# Patient Record
Sex: MALE | Race: WHITE | NOT HISPANIC OR LATINO | Employment: UNEMPLOYED | URBAN - METROPOLITAN AREA
[De-identification: names, ages, dates, MRNs, and addresses within clinical notes are randomized per-mention and may not be internally consistent; named-entity substitution may affect disease eponyms.]

---
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
## 2022-01-18 ENCOUNTER — IMMUNIZATION (OUTPATIENT)
Dept: PHARMACY | Facility: CLINIC | Age: 22
End: 2022-01-18
Payer: OTHER GOVERNMENT

## 2022-01-18 DIAGNOSIS — Z23 NEED FOR VACCINATION: Primary | ICD-10-CM

## 2022-01-19 ENCOUNTER — LAB VISIT (OUTPATIENT)
Dept: PRIMARY CARE CLINIC | Facility: CLINIC | Age: 22
End: 2022-01-19
Payer: OTHER GOVERNMENT

## 2022-01-19 DIAGNOSIS — Z20.822 CONTACT WITH AND (SUSPECTED) EXPOSURE TO COVID-19: ICD-10-CM

## 2022-01-19 LAB
CTP QC/QA: YES
SARS-COV-2 AG RESP QL IA.RAPID: NEGATIVE

## 2022-01-19 PROCEDURE — 87811 SARS-COV-2 COVID19 W/OPTIC: CPT

## 2022-01-20 NOTE — PROGRESS NOTES
"  PSYCHIATRIC EVALUATION     Disclaimer: Evaluation and treatment is based on information presented to date. Any new information may affect assessment and findings.     Name: Herminio Turner  Age: 21 y.o.  : 2000    Note:Face to Face time with patient: 120 minutes of total time spent on the encounter,  (60  which includes face to face time and (60 min +) non-face to face time preparing to see the patient and preparting report    including but not limited to: 1) Obtaining and/or reviewing separately obtained history, 2) Documenting clinical information in the electronic or other health record, 3) Independently  reviewing / interpreting results as clinically indicated ; 4) discussing medication options including risks and benefits as well as 5) recommendations  for therapeutic interventions and 5) where appropriate additional educational resources (ex: reference books / websites); 6) communicating assessment and plan of care to the patient/family/caregiver  7) explaining importance of keeping appts ; and 8) rescheduling IF miss appt  IF acute concerns to call 911 or go to nearest ER.      Referred by: (Whose idea to come see Psychiatry) : referred by William Baird [facility] PCP.     CHIEF COMPLAINT :   Continuity of care ; he has been followed by    [facility]    HISTORY:         Herminio Turner a 21 y.o. ADOPTED (at birth)  male  Who is: 6' 3"  and 310 lbs.  Adoption was at birth; says that it is a CLOSED adoption. Says he has been told bio mom had mental health and substance issues.    Mr Turner (Herminio)  presents today as referred by William Baird MD [facility] PCP.     Pt tells me that  I also know his dad:  Mr Tan Turner (a very nice capable pharmaceutical  representative  who also happens to be Colonel in [employer]. His mom is Concepcion Turner; she was from [place], [place]    He had been followed by   [facility]    Referred in with diagnostics of  Autism, ADHD, Bipolar and Major Depression as well as reference to pt having Gender Identity Issues 'not knowing preference as to male or female.  Denies Substance use.    Says intent is to get all (or as many providers in [facility]) as he able.    Says he likes his meds    Prozac 20 mg x3 a day (60 mg total daily)  Latuda 40 mg  referred in on MYDAYIS (dextroamphtamine-amphetamine CT 24) 37.5 mg  And ALSO TAKES dextroamphtamine-amphetamine 10 mg (TWO) tabs =20 mg) IN ADDITION to MYDAYIS in morning; says no longer takes an additional tab later in day    D Post MD did check [place] [facility]    Mr Turner (Herminio)  Brings in several prior treatment records from:    [facility] [address] [facility] [phone] (ph and fax) : [email or web address] Developmental report done by Leti Lawson (Pat) Psychologist #[id number] ; report of  5-13-17 when he was 16 yrs old ; in 8th grade; Herminio was said brought in for re exam of ADHD which report references was previously varsha at age 8;     SEE  TESTING Report SUBMITTED FOR SCANNING: includes in part:     Wechsler IQ; parent teacher  Rating;   ADHD Eval (Lei CPT III with teacher parent and self form  Autism testing: [place] Asperger's Disorder scale; Social Responsiveness scale  Autism Diagnostic Observation System (ADOS) and   Multidimentional Anxiety Scale for kids  MASC-2)    Some comments from Dr Bria Timmons report:   -Did not speak until 3.5 yrs old  -Diagnosed ADHD at age 8 yrs old  -Rx glasses age 9 yrs   -Phys therapy for hypotonia (at 16 yrs)  -At age 15y began having thoughts he was a female; participated in therapy with Aziza Linares LCSW to address sexual identity ; there was discussion of taking medication to block Restorationist of puberty; therapist is said to have had some expertise / interest in transgender issues.'    SEE FULL REPORT (Dr Miles AS SUBMITTING FOR SCANNING ITNO EPIC  "  Parents reported that Herminio really has 'NO SEXUAL IDENTITY; professing no interest in boys or girls.    Herminio was reported as doing well in school; mother studies with him nightly ; he is said not to have studied on own; 'trouble understanding needs of others around him; said to be sweet and loving child; socially aggravates other students struggling with social skills; not asking teachers or other students for help; fixates on things like building own computer likes video usama and would stay on computer al anil iof allowed; little interest in socializing with otehrs around him angelica enjoys usama with people all around the world; he is said to be very literal; quirky sense humor other not understand.  Mom is stay at home dad is \  Amy has some speech articulation issues    On Weschler Full Scale IQ  83 (true range 79-89 placing at 13th %ile; low avg range  On another measure the General Ability Index (GAI) Herminio did significantly better and mention made of cautiously interpreting FSIQ.  On GAI he scored 95 with confidence interval of 90-101and "in average range."     WISC V scores:  Verbal Comprehension VCI 89 23%;   Visual Spatial Index VSI 92 30%ile  Fluid Reasoning Index  73%  Working memory Index WMI 79 8 %  Processing Speed Index PSI  72 3%     ADHD Assesment: Lei 3 : "eelvated T scores for ALL scales except Defiant. " Lei Probablility Score = scored 99%; indicating individuals with ADHD obtained  This probability score 99% of time.      Adaptive function Sore ABAS II composite score of 64; such reflects Herminio score as as good or btter than 1% of children his same age / extremely low.    [place] Asperger Disorder Scale GADS   Social Interaction score 9 (37%ile)  Restricted Pattern behavior score 14 (91% ile)  Cognitive Pattern score 14 (91 %ile)  Pragmatic Skills score 11 (63%ile)  Asperger quotient score  113 (81 %ile)     MMPI high on depression general apathy ; " lack confidence; social isolation; other trends reflect anxiety self preoccupation and rigidity and may show overreaction to life events     Also  Porsche Paz MSW LCS [email or web address] [email or web address] ; ph:[phone] ; fax: [phone] ; says does not see her any longer ; says saw her in 10th / 11th grade     In clinic today pt presents casually dressed in great athletic shorts and bright red T shirt ; glasses / mask    He is pleasant ; calm; entirely goal directed; he was asked to do some self rating scales and readily did such without difficulty.    As noted [facility].     No Si / no HI / no psychosis.    D Post MD dis review a rather thorough packet of materials as noted above which do support diagnosis of ADHD and autism.    His self rating scales are also recorded below.    He did endorse 9 of 13 items on Mood Disorder Questionnaire (MDQ)    Quick Inventory of Depressive Symptomalogy Self-Report (QID-SR)  River et al, Biol Psychiatry (2003) 54: 573-83.    INSTRUCTIONS: Please select the one response to each item that is most appropriate to how you have been feeling over the past 7 days.    Question Statement Choices Answer   1. Falling asleep: 0 = I never took longer than 30 minutes to fall asleep.  1 = I took at least 30 minutes to fall asleep, less than half the time              (3 days or less out of the past 7 days).  2 = I took at least 30 minutes to fall asleep, more than half the time            (4 days or more out of the past 7 days).  3 = I took more than 60 minutes to fall asleep, more than half the time           (4 days or more out of the past 7 days). 2   2. Sleep during the night: 0 = I didn't wake up at night.  1 = I had a restless, light sleep, briefly waking up a few times each night.  2 = I woke up at least once a night, but I got back to sleep easily.  3 = I woke up more than once a night and stayed awake for 20 mins or more,  more than half the time (4 days or more out of the past 7 days). 2   3. Waking up too early: 0 = Most of the time, I woke up no more than 30 minutes before my scheduled time.  1 = More than half the time (4 days or more out of the past 7 days), I woke up more than 30 minutes before my scheduled time.  2 = I almost always woke up at least one hour or so before my scheduled time, but I got back to sleep eventually.  3 = I woke up at least one hour before my scheduled time, and couldn't get back to sleep. 2   4. Sleeping too much: 0 = I slept no longer than 7-8 hours/night, without napping during the day.  1 = I slept no longer than 10 hours in a 24-hour period including naps.  2 = I slept no longer than 12 hours in a 24-hour period including naps.  3 = I slept longer than 12 hours in a 24-hour period including naps. 2             Highest score on items 1-4: 2    5. Feeling sad: 0 = I didn't feel sad.  1 = I felt sad less than half the time (3 days or less out of the past 7 days).  2 = I felt sad more than half the time (4 days or more out of the past 7 days).  3 = I felt sad nearly all of the time. 1               Item #5 Total: 1    Complete either 6 or 7 (NOT BOTH); Dilip the unselected option as N/A   6. Decreased appetite: 0 = There was no change in my usual appetite.  1 = I ate somewhat less often or smaller amounts of food than usual.  2 = I ate much less than usual and only by forcing myself to eat.  3 = I rarely ate within a 24-hour period, and only by really forcing myself to eat or when others persuaded me to eat. 1   7. Increased appetite: 0 = There was no change in my usual appetite.  1 = I felt a need to eat more frequently than usual.  2 = I regularly ate more often and/or greater amounts of food than usual.  3 = I felt driven to overeat both at mealtime and between meals. 3     Complete either 6 or 7 (NOT BOTH); Dilip the unselected option as N/A   8. Decreased weight (within the last 14 days): 0 = My  weight has not changed.  1 = I feel as if I've had a slight weight loss.  2 = I've lost 2 pounds (about 1 kilo) or more.  3 = I've lost 5 pounds (about 2 kilos) or more. 2   9. Increased weight (within the last 14 days): 0 = My weight has not changed.  1 = I feel as if I've had a slight weight gain.  2 = I've gained 2 pounds (about 1 kilo) or more.  3 = I've gained 5 pounds (about 2 kilos) or more. 0   Highest score on items 6-9: 3    10. Concentration & decision-makin = There was no change in my usual ability to concentrate or make decisions.  1 = I occasionally felt indecisive or found that my attention wandered.  2 = Most of the time, I found it hard to focus or to make decisions.  3 = I couldn't concentrate well enough to read or I couldn't make even minor decisions. 0   11. Perception of myself: 0 = I saw myself as equally worthwhile and deserving as other people.  1 = I put the blame on myself more than usual.  2 = For the most part, I believed that I caused problems for others.  3 = I thought almost constantly about major and minor defects in myself. 2   12. Thoughts of my own death or suicide: 0 = I didn't think of suicide or death.  1 = I felt that life was empty or wondered if it was worth living.  2 = I thought of suicide or death several times for several minutes over the past 7 days.  3 = I thought of suicide or death several times a day in some detail, or I made specific plans for suicide or actually tried to take my life. 1   13. General interest: 0 = There was no change from usual in how interested I was in other people or activities.  1 = I noticed that I was less interested in other people or activities.  2 = I found I had interest in only one or two of the activities I used to do.  3 = I had virtually no interest in the activities I used to do. 1   14. Energy level: 0 = There was no change in my usual level of energy.  1 = I got tired more easily than usual.  2 = I had to make a big effort to  start or finish my usual daily activities (for example: shopping, homework, cooking or going to work).  3 = I really couldn't carry out most of my usual daily activities because I just didn't have the energy. 1                 Items #10-14 Total: 7    15. Feeling more sluggish than usual: 0 = I thought, spoke, and moved at my usual pace.  1 = I found that my thinking was more sluggish than usual or my voice sounded dull or flat.  2 = It took me several seconds to respond to most questions and I was sure my thinking was more sluggish than usual.  3 = I was often unable to respond to questions without forcing myself. 2   16. Feeling restless (agitated, not relaxed, fidgety): 0 = I didn't feel restless.  1 = I was often fidgety, wringing my hands, or needed to change my sitting position.  2 = I had sudden urges to move about and was quite restless.  3 = At times, I was unable to stay seated and needed to pace around. 2             Highest score on items 15-16: 2  Sum the item scores for a total score. Total score range 0-27.     Total Score: 15   1-5 = No depression   6-10 = Mild depression   11-15 = Moderate depression   16-20 = Severe depression   21-27 = Very severe depression    GAD7 1/24/2022   1. Feeling nervous, anxious, or on edge? 2   2. Not being able to stop or control worrying? 2   3. Worrying too much about different things? 2   4. Trouble relaxing? 1   5. Being so restless that it is hard to sit still? 3   6. Becoming easily annoyed or irritable? 1   7. Feeling afraid as if something awful might happen? 3   8. If you checked off any problems, how difficult have these problems made it for you to do your work, take care of things at home, or get along with other people? 2   NAN-7 Score 14       MDQ Scale 1/24/2022   you felt so good or so hyper that other people thought you were not your normal self or you were so hyper that you got into trouble? 1   you were so irritable that you shouted at people or  started fights or arguments? 1   you felt much more self-confident than usual? 1   you got much less sleep than usual and found that you didn't really miss it? 1   you were more talkative or spoke much faster than usual? 1   thoughts raced through your head or you couldn't slow your mind down? 1   you were so easily distracted by things around you that you had trouble concentrating or staying on track? 1   you had more energy than usual? 1   you were much more active or did many more things than usual? 1   you were much more social or outgoing than usual, for example, you telephoned friends in the middle of the night? 0   you were much more interested in sex than usual? 0   you did things that were unusual for you or that other people might have thought were excessive, foolish, or risky? 0   spending money got you or your family in trouble? 1   If you checked YES to more than one of the above, have several of these ever happened during the same period of time? 1   How much of a problem did any of these cause you - like being unable to work; having family, money or legal troubles; getting into arguments or fights? Moderate problem   Mood Disorder Questionnaire Score  10     Adult ADHD Self-Report Scale 1/24/2022   How often do you have trouble wrapping up the final details of a project once the chanllenging parts have been done? 2   How often do you have difficulty getting things in order when you have to do a task that requires organization? 3   How often do you have problems remembering appointments or obligations? 2   When you have a task that requires a lot of thought, how often do you avoid or delay getting started? 2   How often do you fidget or squirm with your hands or feet when you have to sit down for a long time? 3   How often do you feel overly active and compelled to do things, like you were driven by a motor? 2   Part A Score 14   How often do you make careless mistakes when you have to work on a boring  "or difficult project? 2   How often do you have difficulty keeping your attention when you are doing boring or repetitive work? 2   How often do you have difficulty concentrating on what people say to you, even when they are speaking to you directly? 2   How often do you misplace or have difficulty finding things at home or at work? 3   How often are you distracted by activity or noise around you? 3   How often do you leave your seat in meetings or other situations in which you are expected to remain seated? 0   How often do you feel restless or fidgety? 2   How often do you have difficulty unwinding and relaxing when you have time to yourself? 3   How often do you find yourself talking too much when you are in social situations? 2   When you're in a conversation, how often do you find yourself finishing the sentences of the people you are talking to before they can finish them themselves? 1   How often do you have difficulty waiting your turn in situations when turn taking is required? 1   How often do you interrupt others when they are busy? 0   Part B Score 21       Prior  Treatment    Inpatient  None  Outpatient: In past (10th / 11th grade) Porsche Paz [employer] [facility]ACS [email or web address] ; ph:[phone] ; fax: [phone] and then was seeing [facility]    Self Rating Scales: (Such submitted for scanning) :     Quick Inventory of Depression (QID-Short Form):15  NAN-7 : 14  Mood Disorder Questionnaire (MDQ): 10   The [facility] Framework: a "bio-psycho-social" screening form for use as clinical raw data. / (submitted for scanning)     Physical Abuse: N    Sexual abuse  N    Psychosis: N    Substance Use:    Alcohol: says no control; tho has tried; has enjoyed tho notes was not much; maybe had 1 beer a week tho parents locked it up as a precaution (birth mom had issues alcohol and said to have used 'other drugs' and bipolar so parents being cautious)    Cannabis: " "none    Tobacco:  None     Cocaine:n  Benzo:n  Opioid: n  Ecstasy:n  Other:n    PAST PSYCHIATRIC HISTORY:     Inpatient: n  Outpatient: (incl. Primary Care): in past saw Porsche LINK [email or web address] ; ph:[phone] ; fax: [phone]    Allergy Review:   Review of patient's allergies indicates:  No Known Allergies     Medical Problem List:   Patient Active Problem List   Diagnosis    ADD (attention deficit disorder with hyperactivity)    Anxiety disorder    Autism spectrum disorder associated with neurodevelopmental, mental or behavioral disorder, requiring support (level 1)    Right foot pain    Adult BMI 38.0-38.9 kg/sq m (6'3" and 310 lb)    Bipolar 1 disorder (Jan 25 2022: referred in on Latuda 40 mg)    Gender identity issues; in adol identified some  as female tho parents in record report that actauly no attraction to either male or female        Past Surgical History:   Procedure Laterality Date    ADENOIDECTOMY      CIRCUMCISION      TONSILLECTOMY      TYMPANOSTOMY TUBE PLACEMENT          Other (medical) :    Head Injury: n  Seizure: n  Diabetes n  HTN n  Other: n    Family History:  Family History   Adopted: Yes   Problem Relation Age of Onset    Crohn's disease Mother     No Known Problems Father         Mental Status Exam:   Appearance: casual / overweight (6'3" and 310 lbs); wearing  red T shirt / grey athletic shorts  Oriented: x 3 / including: Date: Jan 24 2022; and aware that at: [facility] [place], [place],   Attitude: cooperative (admits tends to be nervous meeting new people tho did well here today with me)  Eye Contact: good   Behavior: calm here   Mood: says Ok   Cognition: alert / 20-3: 17, 14, 11, 8, 5 ,2, -1  Concentration: grossly intact tho reports at times struggles / is on ADHD meds  Affect: appropriate range   Anxiety: moderate  Thought Process: goal directed   Speech: Volume : WNL   Quantity WNL   Quality: appears to openly answer questions ; mild/ " "mod lisp   Eye Contact: good   Insight: acknowledges Bipolar ADHD Autism (he says social awkward / nervous)  Threats: no SI / no HI ; never any act self harm  Memory: intact (as relay information across time spans) Pres?  BIDEN        Prior Pres? TRUMP  Psychosis: denies all   Estimate of Intellectual Function: average   Judgment (to simple situation): if saw a house on fire / A: call 911   Impulse Control: has some issues / says parents  / IF see something want / may go and get such ad ; no gambling ; no yell / no shout ; no hit punch     3 wishes? : money, car / not think of a 3rd desire    PSYCHO-SOCIAL DEVELOPMENT HISTORY:     City Born: [facility]    Siblings:  Sisters: he has one adopted sister (not bio related) ; sister was adopted  from [place] when sister was  33 months     Parents : alive     Briefly Describe  your Mom: kind ; sometimes speaks before thinking  Briefly Describe your Dad: very hard working  and kind    Bio Mom: Occupation: homemaker  Bio Dad:  Occupation: pharma rep and in [employer]     Marital Status:  Single    Sexual orientation: says no strong "pull attraction to either sex" tho could find either attractive so he says that is why he says  bisexual tho says he has not had long term relationship     Education: grad [employer] (virtual [employer] regular classes) / [employer] ;  Went [employer] Aug to Dec 2021 (on campus)    Hindu / Spiritual: n    Legal Issues? n  DWI ?n  penitentiary time?n    Employment:  at [employer] says looking  for another job    On Disability? n    Assessment:     Encounter Diagnoses   Name Primary?    Bipolar 1 disorder (Jan 25 2022: referred in on Latuda 40 mg) Yes    Autism spectrum disorder associated with neurodevelopmental, mental or behavioral disorder, requiring support (level 1)     Attention deficit disorder with hyperactivity     Gender identity issues; in adol identified some  as female tho parents in record report that actauly " "no attraction to either male or female; saw counselor specific to this in past     Anxiety disorder, unspecified type     Adult BMI 38.0-38.9 kg/sq m (6'3" and 310 lb)         Current Outpatient Medications:     celecoxib (CELEBREX) 200 MG capsule, Take 1 capsule (200 mg total) by mouth once daily., Disp: 30 capsule, Rfl: 3    [START ON 2/11/2022] dextroamphetamine-amphetamine (MYDAYIS) 37.5 mg CT24, Take 37.5 mg by mouth once daily., Disp: 30 each, Rfl: 0    [START ON 3/11/2022] dextroamphetamine-amphetamine (MYDAYIS) 37.5 mg CT24, Take 37.5 mg by mouth once daily., Disp: 30 each, Rfl: 0    dextroamphetamine-amphetamine 10 mg Tab, 2 tablets in AM and one Tablet at noon, Disp: 90 tablet, Rfl: 0    [START ON 2/11/2022] dextroamphetamine-amphetamine 10 mg Tab, Take 2 tablets (20 mg total) by mouth once daily., Disp: 60 tablet, Rfl: 0    [START ON 3/11/2022] dextroamphetamine-amphetamine 10 mg Tab, Take 2 tablets (20 mg total) by mouth once daily., Disp: 60 tablet, Rfl: 0    FLUoxetine 20 MG capsule, 3 capsule in the morning, Disp: , Rfl:     FLUoxetine 20 MG capsule, Take 3 capsules (60 mg total) by mouth once daily., Disp: 90 capsule, Rfl: 2    ketoconazole (NIZORAL) 2 % shampoo, Apply topically 3 (three) times a week., Disp: 120 mL, Rfl: 2    lurasidone (LATUDA) 40 mg Tab tablet, Take 1 tablet (40 mg total) by mouth once daily., Disp: 30 tablet, Rfl: 2    sars-cov-2, covid-19, (PFIZER COVID-19) 30 mcg/0.3 ml injection, Inject into the muscle., Disp: 0.3 mL, Rfl: 0  Patient Instructions       PLAN:     FOLLOW UP     3/21/2022  1:00 PM ESTABLISHED PATIENT [facility] 2ndFl Dusty Lovell MD     Meds:  see after visit summary    References:     Relaxation stress reduction workbook: GODFREY Garza PhD ( used: $7-10)    Feeling Good Website: Dusty Krishnamurthy MD / www.[employer].com website (free) / varsha. PODCASTS    Anxiety &  phobia workbook by SHANTI Mckeon PhD  (web retailers: used: $ " 7-10)    [place]: Path to Better Sleep : https://www.veterantraining.[place].gov/insomnia/ (free)       Pt expressed appreciation for the visit today and did not have further question at this time though pt  was still informed to:     Call  if problems.    Call / Report Side Effects to Psyc MD     Encouraged to follow up with primary care / Gen Med MD for continued monitoring of general health and wellness.    Understanding was expressed; and no further concerns nor questions were raised at this time.     remember healthy self care:   eat right  attempt adequate rest   HANDWASHING / encourage such varsha. During this corona virus time   walk or light exercise within reason and as your general med team approves  read or explore any of reference materials / homework mentioned  reach out (I.e.,  connect with)  others who nuture and bring out best in you  avoid risky behaviors  keep your appointments  IF you  cannot make your appt THEN please call or go online to reschedule.  avoid  alcohol and illicit substances.  Look for the positive.  All is often relative-seek balance  Call sooner if needed : [phone]   Call 911 or go to Emergency Room  (ER)  if any acute concerns

## 2022-01-24 ENCOUNTER — OFFICE VISIT (OUTPATIENT)
Dept: PSYCHIATRY | Facility: CLINIC | Age: 22
End: 2022-01-24
Payer: OTHER GOVERNMENT

## 2022-01-24 VITALS — HEIGHT: 75 IN | BODY MASS INDEX: 38.61 KG/M2 | WEIGHT: 310.5 LBS

## 2022-01-24 DIAGNOSIS — F31.9 BIPOLAR 1 DISORDER: Primary | ICD-10-CM

## 2022-01-24 DIAGNOSIS — F66 GENDER IDENTITY UNCERTAINTY: ICD-10-CM

## 2022-01-24 DIAGNOSIS — F90.9 ATTENTION DEFICIT DISORDER WITH HYPERACTIVITY: ICD-10-CM

## 2022-01-24 DIAGNOSIS — F84.0 AUTISM SPECTRUM DISORDER ASSOCIATED WITH NEURODEVELOPMENTAL, MENTAL OR BEHAVIORAL DISORDER, REQUIRING SUPPORT (LEVEL 1): ICD-10-CM

## 2022-01-24 DIAGNOSIS — F41.9 ANXIETY DISORDER, UNSPECIFIED TYPE: ICD-10-CM

## 2022-01-24 PROCEDURE — 99213 OFFICE O/P EST LOW 20 MIN: CPT | Mod: PBBFAC | Performed by: PSYCHIATRY & NEUROLOGY

## 2022-01-24 PROCEDURE — 99999 PR PBB SHADOW E&M-EST. PATIENT-LVL III: ICD-10-PCS | Mod: PBBFAC,,, | Performed by: PSYCHIATRY & NEUROLOGY

## 2022-01-24 PROCEDURE — 99999 PR PBB SHADOW E&M-EST. PATIENT-LVL III: CPT | Mod: PBBFAC,,, | Performed by: PSYCHIATRY & NEUROLOGY

## 2022-01-24 PROCEDURE — 99205 PR OFFICE/OUTPT VISIT, NEW, LEVL V, 60-74 MIN: ICD-10-PCS | Mod: S$PBB,,, | Performed by: PSYCHIATRY & NEUROLOGY

## 2022-01-24 PROCEDURE — 99205 OFFICE O/P NEW HI 60 MIN: CPT | Mod: S$PBB,,, | Performed by: PSYCHIATRY & NEUROLOGY

## 2022-01-24 RX ORDER — FLUOXETINE HYDROCHLORIDE 20 MG/1
60 CAPSULE ORAL DAILY
Qty: 90 CAPSULE | Refills: 2 | Status: SHIPPED | OUTPATIENT
Start: 2022-01-24 | End: 2022-03-21 | Stop reason: SDUPTHER

## 2022-01-24 RX ORDER — DEXTROAMPHETAMINE SULFATE, DEXTROAMPHETAMINE SACCHARATE, AMPHETAMINE ASPARTATE MONOHYDRATE, AND AMPHETAMINE SULFATE 9.375; 9.375; 9.375; 9.375 MG/1; MG/1; MG/1; MG/1
37.5 CAPSULE, EXTENDED RELEASE ORAL DAILY
Qty: 30 EACH | Refills: 0 | Status: SHIPPED | OUTPATIENT
Start: 2022-02-11 | End: 2022-09-22

## 2022-01-24 RX ORDER — DEXTROAMPHETAMINE SULFATE, DEXTROAMPHETAMINE SACCHARATE, AMPHETAMINE ASPARTATE MONOHYDRATE, AND AMPHETAMINE SULFATE 9.375; 9.375; 9.375; 9.375 MG/1; MG/1; MG/1; MG/1
37.5 CAPSULE, EXTENDED RELEASE ORAL DAILY
Qty: 30 EACH | Refills: 0 | Status: SHIPPED | OUTPATIENT
Start: 2022-03-11 | End: 2022-03-21 | Stop reason: SDUPTHER

## 2022-01-24 RX ORDER — LURASIDONE HYDROCHLORIDE 40 MG/1
40 TABLET, FILM COATED ORAL DAILY
Qty: 30 TABLET | Refills: 2 | Status: SHIPPED | OUTPATIENT
Start: 2022-01-24 | End: 2022-03-21 | Stop reason: SDUPTHER

## 2022-01-24 RX ORDER — DEXTROAMPHETAMINE SACCHARATE, AMPHETAMINE ASPARTATE, DEXTROAMPHETAMINE SULFATE AND AMPHETAMINE SULFATE 2.5; 2.5; 2.5; 2.5 MG/1; MG/1; MG/1; MG/1
20 TABLET ORAL DAILY
Qty: 60 TABLET | Refills: 0 | Status: SHIPPED | OUTPATIENT
Start: 2022-02-11 | End: 2022-03-13

## 2022-01-24 RX ORDER — DEXTROAMPHETAMINE SACCHARATE, AMPHETAMINE ASPARTATE, DEXTROAMPHETAMINE SULFATE AND AMPHETAMINE SULFATE 2.5; 2.5; 2.5; 2.5 MG/1; MG/1; MG/1; MG/1
20 TABLET ORAL DAILY
Qty: 60 TABLET | Refills: 0 | Status: SHIPPED | OUTPATIENT
Start: 2022-03-11 | End: 2022-03-21 | Stop reason: SDUPTHER

## 2022-01-24 NOTE — PATIENT INSTRUCTIONS
PLAN:     FOLLOW UP     3/21/2022  1:00 PM ESTABLISHED PATIENT [facility] Dusty Lovell MD     Meds:  see after visit summary    References:     Relaxation stress reduction workbook: GODFREY Garza PhD ( used: $7-10)    Feeling Good Website: Dusty Krishnamurthy MD / www.[email or web address] website (free) / varsha. PODCASTS    Anxiety &  phobia workbook by SHANTI Mckeon PhD  (web retailers: used: $ 7-10)    [place]: Path to Better Sleep : https://www.veter[place]raining.[place].gov[place]insomnia/ (free)       Pt expressed appreciation for the visit today and did not have further question at this time though pt  was still informed to:     Call  if problems.    Call / Report Side Effects to Psyc MD     Encouraged to follow up with primary care / Gen Med MD for continued monitoring of general health and wellness.    Understanding was expressed; and no further concerns nor questions were raised at this time.     remember healthy self care:   eat right  attempt adequate rest   HANDWASHING / encourage such varsha. During this corona virus time   walk or light exercise within reason and as your general med team approves  read or explore any of reference materials / homework mentioned  reach out (I.e.,  connect with)  others who nuture and bring out best in you  avoid risky behaviors  keep your appointments  IF you  cannot make your appt THEN please call or go online to reschedule.  avoid  alcohol and illicit substances.  Look for the positive.  All is often relative-seek balance  Call sooner if needed : [phone]   Call 911 or go to Emergency Room  (ER)  if any acute concerns

## 2022-02-01 ENCOUNTER — PATIENT OUTREACH (OUTPATIENT)
Dept: ADMINISTRATIVE | Facility: OTHER | Age: 22
End: 2022-02-01
Payer: OTHER GOVERNMENT

## 2022-02-02 ENCOUNTER — OFFICE VISIT (OUTPATIENT)
Dept: OTOLARYNGOLOGY | Facility: CLINIC | Age: 22
End: 2022-02-02
Payer: OTHER GOVERNMENT

## 2022-02-02 VITALS — WEIGHT: 315 LBS | TEMPERATURE: 98 F | BODY MASS INDEX: 39.68 KG/M2

## 2022-02-02 DIAGNOSIS — H61.23 BILATERAL IMPACTED CERUMEN: Primary | ICD-10-CM

## 2022-02-02 PROCEDURE — 99999 PR PBB SHADOW E&M-EST. PATIENT-LVL III: CPT | Mod: PBBFAC,,, | Performed by: PHYSICIAN ASSISTANT

## 2022-02-02 PROCEDURE — 99999 PR PBB SHADOW E&M-EST. PATIENT-LVL III: ICD-10-PCS | Mod: PBBFAC,,, | Performed by: PHYSICIAN ASSISTANT

## 2022-02-02 PROCEDURE — 69210 REMOVE IMPACTED EAR WAX UNI: CPT | Mod: S$PBB,,, | Performed by: PHYSICIAN ASSISTANT

## 2022-02-02 PROCEDURE — 69210 PR REMOVAL IMPACTED CERUMEN REQUIRING INSTRUMENTATION, UNILATERAL: ICD-10-PCS | Mod: S$PBB,,, | Performed by: PHYSICIAN ASSISTANT

## 2022-02-02 PROCEDURE — 99499 NO LOS: ICD-10-PCS | Mod: S$PBB,,, | Performed by: PHYSICIAN ASSISTANT

## 2022-02-02 PROCEDURE — 69210 REMOVE IMPACTED EAR WAX UNI: CPT | Mod: PBBFAC | Performed by: PHYSICIAN ASSISTANT

## 2022-02-02 PROCEDURE — 99213 OFFICE O/P EST LOW 20 MIN: CPT | Mod: PBBFAC | Performed by: PHYSICIAN ASSISTANT

## 2022-02-02 PROCEDURE — 99499 UNLISTED E&M SERVICE: CPT | Mod: S$PBB,,, | Performed by: PHYSICIAN ASSISTANT

## 2022-02-02 NOTE — PROGRESS NOTES
Subjective:   Cerumen impactions     Patient ID: Herminio Turner is a 21 y.o. male.    Chief Complaint:  Excessive ear wax     Herminio Turner is a 21 y.o. male here to see me today for evaluation of a possible wax impaction in bilateral ears.  He has complaints of fullness in both ears over past few months but denies any hearing loss in the affected ears.  He denies any ear pain or drainage.  This has not been an issue in the past.  The patient has been using Debrox to soften the wax about 3-4 weeks ago.  He had tubes in childhood.  Denies any tinnitus or dizziness.    HPI  Review of Systems   HENT: Positive for ear fullness. Negative for hearing loss, ear discharge, ear pain and tinnitus.        Objective:     Physical Exam   HENT:   Right Ear: External ear and ear canal normal. Decreased hearing is noted.   Left Ear: External ear and ear canal normal. Decreased hearing is noted.   Bilateral moderate soft cerumen impactions, removal described below       Procedure Note    CHIEF COMPLAINT:  Cerumen Impaction    Description:  The patient was seated in an exam chair.  An ear speculum was placed in the right EAC and was examined under the microscope.  Suction and/or loop curettes were used to remove a large cerumen impaction.  The tympanic membrane was visualized and was normal in appearance.  The procedure was repeated on the left side in a similar fashion.  The TM was intact and normal on this side as well.  The patient tolerated the procedure well.           Assessment:     1. Bilateral impacted cerumen        Plan:     1.  Cerumen impaction:  Removed today without difficulty.  I would recommend the use of a wax softening drop, either over the counter Debrox or mineral oil, on a weekly basis.  I also instructed the patient to avoid Qtips.  RTC as needed for ear cleaning.  He notes improvement in the aural fullness after ear cleaning today.  I recommend scheduling an audiogram when motivated if any concerns  about his hearing.  He declines today.

## 2022-02-02 NOTE — PROGRESS NOTES
Health Maintenance Due   Topic Date Due    Hepatitis C Screening  Never done    HIV Screening  Never done    Influenza Vaccine (1) 09/01/2021     Updates were requested from care everywhere.  Chart was reviewed for overdue Proactive [facility] Encounters ([facility]) topics (CRS, Breast Cancer Screening, Eye exam)  Health Maintenance has been updated.  LINKS immunization registry triggered.  Immunizations were reconciled.

## 2022-02-05 PROBLEM — F66 GENDER IDENTITY UNCERTAINTY: Status: ACTIVE | Noted: 2022-02-05

## 2022-02-18 ENCOUNTER — PATIENT MESSAGE (OUTPATIENT)
Dept: PSYCHIATRY | Facility: CLINIC | Age: 22
End: 2022-02-18
Payer: OTHER GOVERNMENT

## 2022-03-21 ENCOUNTER — OFFICE VISIT (OUTPATIENT)
Dept: PSYCHIATRY | Facility: CLINIC | Age: 22
End: 2022-03-21
Payer: OTHER GOVERNMENT

## 2022-03-21 VITALS
SYSTOLIC BLOOD PRESSURE: 117 MMHG | WEIGHT: 311.5 LBS | HEART RATE: 89 BPM | BODY MASS INDEX: 38.93 KG/M2 | DIASTOLIC BLOOD PRESSURE: 74 MMHG

## 2022-03-21 DIAGNOSIS — F90.9 ATTENTION DEFICIT DISORDER WITH HYPERACTIVITY: ICD-10-CM

## 2022-03-21 DIAGNOSIS — F66 GENDER IDENTITY UNCERTAINTY: ICD-10-CM

## 2022-03-21 DIAGNOSIS — F41.9 ANXIETY DISORDER, UNSPECIFIED TYPE: ICD-10-CM

## 2022-03-21 DIAGNOSIS — F31.9 BIPOLAR 1 DISORDER: Primary | ICD-10-CM

## 2022-03-21 DIAGNOSIS — F84.0 AUTISM SPECTRUM DISORDER ASSOCIATED WITH NEURODEVELOPMENTAL, MENTAL OR BEHAVIORAL DISORDER, REQUIRING SUPPORT (LEVEL 1): ICD-10-CM

## 2022-03-21 PROCEDURE — 99999 PR PBB SHADOW E&M-EST. PATIENT-LVL III: CPT | Mod: PBBFAC,,, | Performed by: PSYCHIATRY & NEUROLOGY

## 2022-03-21 PROCEDURE — 99214 PR OFFICE/OUTPT VISIT, EST, LEVL IV, 30-39 MIN: ICD-10-PCS | Mod: S$PBB,,, | Performed by: PSYCHIATRY & NEUROLOGY

## 2022-03-21 PROCEDURE — 99214 OFFICE O/P EST MOD 30 MIN: CPT | Mod: S$PBB,,, | Performed by: PSYCHIATRY & NEUROLOGY

## 2022-03-21 PROCEDURE — 99999 PR PBB SHADOW E&M-EST. PATIENT-LVL III: ICD-10-PCS | Mod: PBBFAC,,, | Performed by: PSYCHIATRY & NEUROLOGY

## 2022-03-21 PROCEDURE — 99213 OFFICE O/P EST LOW 20 MIN: CPT | Mod: PBBFAC | Performed by: PSYCHIATRY & NEUROLOGY

## 2022-03-21 RX ORDER — LURASIDONE HYDROCHLORIDE 40 MG/1
40 TABLET, FILM COATED ORAL DAILY
Qty: 30 TABLET | Refills: 2 | Status: SHIPPED | OUTPATIENT
Start: 2022-03-21 | End: 2022-06-07 | Stop reason: SDUPTHER

## 2022-03-21 RX ORDER — DEXTROAMPHETAMINE SULFATE, DEXTROAMPHETAMINE SACCHARATE, AMPHETAMINE ASPARTATE MONOHYDRATE, AND AMPHETAMINE SULFATE 9.375; 9.375; 9.375; 9.375 MG/1; MG/1; MG/1; MG/1
37.5 CAPSULE, EXTENDED RELEASE ORAL DAILY
Qty: 30 EACH | Refills: 0 | Status: SHIPPED | OUTPATIENT
Start: 2022-04-20 | End: 2022-09-22

## 2022-03-21 RX ORDER — DEXTROAMPHETAMINE SACCHARATE, AMPHETAMINE ASPARTATE, DEXTROAMPHETAMINE SULFATE AND AMPHETAMINE SULFATE 2.5; 2.5; 2.5; 2.5 MG/1; MG/1; MG/1; MG/1
20 TABLET ORAL DAILY
Qty: 60 TABLET | Refills: 0 | Status: SHIPPED | OUTPATIENT
Start: 2022-04-20 | End: 2022-05-20

## 2022-03-21 RX ORDER — DEXTROAMPHETAMINE SACCHARATE, AMPHETAMINE ASPARTATE, DEXTROAMPHETAMINE SULFATE AND AMPHETAMINE SULFATE 2.5; 2.5; 2.5; 2.5 MG/1; MG/1; MG/1; MG/1
20 TABLET ORAL DAILY
Qty: 60 TABLET | Refills: 0 | Status: SHIPPED | OUTPATIENT
Start: 2022-03-21 | End: 2022-04-20

## 2022-03-21 RX ORDER — FLUOXETINE HYDROCHLORIDE 20 MG/1
60 CAPSULE ORAL DAILY
Qty: 90 CAPSULE | Refills: 2 | Status: SHIPPED | OUTPATIENT
Start: 2022-03-21 | End: 2022-06-07 | Stop reason: SDUPTHER

## 2022-03-21 RX ORDER — DEXTROAMPHETAMINE SACCHARATE, AMPHETAMINE ASPARTATE, DEXTROAMPHETAMINE SULFATE AND AMPHETAMINE SULFATE 2.5; 2.5; 2.5; 2.5 MG/1; MG/1; MG/1; MG/1
20 TABLET ORAL DAILY
Qty: 60 TABLET | Refills: 0 | Status: SHIPPED | OUTPATIENT
Start: 2022-05-20 | End: 2022-06-19

## 2022-03-21 RX ORDER — DEXTROAMPHETAMINE SULFATE, DEXTROAMPHETAMINE SACCHARATE, AMPHETAMINE ASPARTATE MONOHYDRATE, AND AMPHETAMINE SULFATE 9.375; 9.375; 9.375; 9.375 MG/1; MG/1; MG/1; MG/1
37.5 CAPSULE, EXTENDED RELEASE ORAL DAILY
Qty: 30 EACH | Refills: 0 | Status: SHIPPED | OUTPATIENT
Start: 2022-03-21 | End: 2022-09-22

## 2022-03-21 RX ORDER — DEXTROAMPHETAMINE SULFATE, DEXTROAMPHETAMINE SACCHARATE, AMPHETAMINE ASPARTATE MONOHYDRATE, AND AMPHETAMINE SULFATE 9.375; 9.375; 9.375; 9.375 MG/1; MG/1; MG/1; MG/1
37.5 CAPSULE, EXTENDED RELEASE ORAL DAILY
Qty: 30 EACH | Refills: 0 | Status: SHIPPED | OUTPATIENT
Start: 2022-05-20 | End: 2022-06-07 | Stop reason: SDUPTHER

## 2022-03-21 NOTE — PROGRESS NOTES
"  Herminio Salcedonett   2000 03/21/2022     Disclaimer: Evaluation and treatment is based on information presented to date. Any new information may affect assessment and findings.     Location:  In Clinic     Who (in attendance) :    pt himself     1st follow up after psyc eval     Time 35 min     Bipolar / Autism / ADHD / Anxiety case   dad pharma rep Tan MYERS marcus (who also officer  in [employer])     S: Patient's Own Perception of Condition (& Side Effects): no med complaint      O:      CURRENT PRESENTATION:      Calm pleasant  Goal directed     Enjoyed seeing movie ("Jackass" ) with dad      Says PAID OFF credit card ;about $500 ; says parents were able get loan to build apt over garage for him / he is excited as low rent    Comments Medication: likes medication     Constitutional Health Concerns:        Review of Systems   Constitutional:        Tho overweight ; his efforts to lose weight paid off: lost about 6 lbs / now 311.5 lb   HENT: Negative.    Eyes: Negative.    Respiratory: Negative.    Cardiovascular: Negative.    Gastrointestinal: Negative.    Genitourinary: Negative.    Musculoskeletal: Negative.    Skin: Negative.    Neurological: Negative.    Endo/Heme/Allergies: Negative.        Vitals:    03/21/22 1326   BP: 117/74   Pulse: 89       Laboratory Data  No visits with results within 1 Month(s) from this visit.   Latest known visit with results is:   Lab Visit on 01/19/2022   Component Date Value Ref Range Status    SARS Coronavirus 2 Antigen 01/19/2022 Negative  Negative Final     Acceptable 01/19/2022 Yes   Final        Mental Status Exam:      Appearance: casual   Oriented: x 3   Attitude: cooperative   Eye Contact: good  Behavior: calm     Mood: says feels ok   Cognition: alert  Concentration: grossly intact   Affect: appropriate range      Anxiety: mild     Thought Process: goal directed     Speech:       Volume : WNL       Quantity WNL       Quality: appears to openly answer " "questions      Threats: no SI / no HI     Psychosis: denies all      Estimate of Intellectual Function: average   Impulse Control: no thoughts of harm to self/ others      Musculoskeletal:  No tremor     Social: lives with parent ; tho planning to have garage apt at parents / likes biology / science ;     Patient Active Problem List   Diagnosis    ADD (attention deficit disorder with hyperactivity)    Anxiety disorder    Autism spectrum disorder associated with neurodevelopmental, mental or behavioral disorder, requiring support (level 1)    Right foot pain    Adult BMI 38.0-38.9 kg/sq m (6'3" and 310 lb)    Bipolar 1 disorder (Jan 25 2022: referred in on Latuda 40 mg)    Gender identity issues; in adol identified some  as female tho parents in record report that actauly no attraction to either male or female          Current Outpatient Medications:     celecoxib (CELEBREX) 200 MG capsule, Take 1 capsule (200 mg total) by mouth once daily., Disp: 30 capsule, Rfl: 3    dextroamphetamine-amphetamine (MYDAYIS) 37.5 mg CT24, Take 37.5 mg by mouth once daily., Disp: 30 each, Rfl: 0    dextroamphetamine-amphetamine (MYDAYIS) 37.5 mg CT24, Take 37.5 mg by mouth once daily., Disp: 30 each, Rfl: 0    [START ON 4/20/2022] dextroamphetamine-amphetamine (MYDAYIS) 37.5 mg CT24, Take 37.5 mg by mouth once daily., Disp: 30 each, Rfl: 0    [START ON 5/20/2022] dextroamphetamine-amphetamine (MYDAYIS) 37.5 mg CT24, Take 37.5 mg by mouth once daily., Disp: 30 each, Rfl: 0    dextroamphetamine-amphetamine 10 mg Tab, 2 tablets in AM and one Tablet at noon, Disp: 90 tablet, Rfl: 0    dextroamphetamine-amphetamine 10 mg Tab, Take 2 tablets (20 mg total) by mouth once daily., Disp: 60 tablet, Rfl: 0    [START ON 4/20/2022] dextroamphetamine-amphetamine 10 mg Tab, Take 2 tablets (20 mg total) by mouth once daily., Disp: 60 tablet, Rfl: 0    [START ON 5/20/2022] dextroamphetamine-amphetamine 10 mg Tab, Take 2 tablets (20 mg " "total) by mouth once daily., Disp: 60 tablet, Rfl: 0    FLUoxetine 20 MG capsule, Take 3 capsules (60 mg total) by mouth once daily., Disp: 90 capsule, Rfl: 2    ketoconazole (NIZORAL) 2 % shampoo, Apply topically 3 (three) times a week., Disp: 120 mL, Rfl: 2    lurasidone (LATUDA) 40 mg Tab tablet, Take 1 tablet (40 mg total) by mouth once daily., Disp: 30 tablet, Rfl: 2    sars-cov-2, covid-19, (PFIZER COVID-19) 30 mcg/0.3 ml injection, Inject into the muscle., Disp: 0.3 mL, Rfl: 0     Social History     Tobacco Use   Smoking Status Never Smoker   Smokeless Tobacco Never Used        Review of patient's allergies indicates:  No Known Allergies     ASSESSMENT:   Encounter Diagnoses   Name Primary?    Bipolar 1 disorder (Jan 25 2022: referred in on Latuda 40 mg) Yes    Autism spectrum disorder associated with neurodevelopmental, mental or behavioral disorder, requiring support (level 1)     Anxiety disorder, unspecified type     Attention deficit disorder with hyperactivity     Gender identity issues; in adol identified some  as female tho parents in record report that actauly no attraction to either male or female; saw counselor specific to this in past     Adult BMI 38.0-38.9 kg/sq m (6'3" and 310 lb)          Patient Instructions     PLAN:     FOLLOW UP June 7 2022 @1 pm In Clinic    Meds:  see after visit summary  / as discussed with him / aligned dates for both immed release stimulant and long acting  / to be filled on same days : 3-21 / 4-20 / 5-20  (2022)   Other psyc med renew as prior      References:     Relaxation stress reduction workbook: GODFREY Garza PhD ( used: $7-10)    Feeling Good Website: Dusty Krishnamurthy MD / www.[employer].com website (free) / varsha. PODCASTS    Anxiety &  phobia workbook by SHANTI Mckeon PhD  (web retailers: used: $ 7-10)    [place]: Path to Better Sleep : https://www.veterantraining.[place]/insomnia/ (free)       Pt expressed appreciation for the visit today and did not have " further question at this time though pt  was still informed to:     Call  if problems.    Call / Report Side Effects to Psyc MD     Encouraged to follow up with primary care / Gen Med MD for continued monitoring of general health and wellness.    Understanding was expressed; and no further concerns nor questions were raised at this time.     remember healthy self care:   eat right  attempt adequate rest   HANDWASHING / encourage such varsha. During this corona virus time   walk or light exercise within reason and as your general med team approves  read or explore any of reference materials / homework mentioned  reach out (I.e.,  connect with)  others who nuture and bring out best in you  avoid risky behaviors  keep your appointments  IF you  cannot make your appt THEN please call or go online to reschedule.  avoid  alcohol and illicit substances.  Look for the positive.  All is often relative-seek balance  Call sooner if needed : [phone]   Call 911 or go to Emergency Room  (ER)  if any acute concerns

## 2022-03-21 NOTE — PATIENT INSTRUCTIONS
PLAN:     FOLLOW UP June 7 2022 @1 pm In Clinic    Meds:  see after visit summary  / as discussed with him / aligned dates for both immed release stimulant and long acting  / to be filled on same days : 3-21 / 4-20 / 5-20  (2022)   Other psyc med renew as prior      References:     Relaxation stress reduction workbook: GODFREY Garza PhD ( used: $7-10)    Feeling Good Website: Dusty Krishnamurthy MD / www.[employer] website (free) / varsha. PODCASTS    Anxiety &  phobia workbook by SHANTI Mckeon PhD  (web retailers: used: $ 7-10)    [place]: Path to Better Sleep : https://www.veterantraining.[place]/insomnia/ (free)       Pt expressed appreciation for the visit today and did not have further question at this time though pt  was still informed to:     Call  if problems.    Call / Report Side Effects to Psyc MD     Encouraged to follow up with primary care / Gen Med MD for continued monitoring of general health and wellness.    Understanding was expressed; and no further concerns nor questions were raised at this time.     remember healthy self care:   eat right  attempt adequate rest   HANDWASHING / encourage such varsha. During this corona virus time   walk or light exercise within reason and as your general med team approves  read or explore any of reference materials / homework mentioned  reach out (I.e.,  connect with)  others who nuture and bring out best in you  avoid risky behaviors  keep your appointments  IF you  cannot make your appt THEN please call or go online to reschedule.  avoid  alcohol and illicit substances.  Look for the positive.  All is often relative-seek balance  Call sooner if needed : [phone]   Call 911 or go to Emergency Room  (ER)  if any acute concerns

## 2022-04-13 ENCOUNTER — OFFICE VISIT (OUTPATIENT)
Dept: OTOLARYNGOLOGY | Facility: CLINIC | Age: 22
End: 2022-04-13
Payer: OTHER GOVERNMENT

## 2022-04-13 VITALS
WEIGHT: 315 LBS | SYSTOLIC BLOOD PRESSURE: 124 MMHG | DIASTOLIC BLOOD PRESSURE: 82 MMHG | BODY MASS INDEX: 39.65 KG/M2 | HEART RATE: 95 BPM | TEMPERATURE: 98 F

## 2022-04-13 DIAGNOSIS — H61.21 IMPACTED CERUMEN OF RIGHT EAR: Primary | ICD-10-CM

## 2022-04-13 PROCEDURE — 99213 OFFICE O/P EST LOW 20 MIN: CPT | Mod: PBBFAC | Performed by: PHYSICIAN ASSISTANT

## 2022-04-13 PROCEDURE — 69210 REMOVE IMPACTED EAR WAX UNI: CPT | Mod: S$PBB,,, | Performed by: PHYSICIAN ASSISTANT

## 2022-04-13 PROCEDURE — 99499 UNLISTED E&M SERVICE: CPT | Mod: S$PBB,,, | Performed by: PHYSICIAN ASSISTANT

## 2022-04-13 PROCEDURE — 99499 NO LOS: ICD-10-PCS | Mod: S$PBB,,, | Performed by: PHYSICIAN ASSISTANT

## 2022-04-13 PROCEDURE — 99999 PR PBB SHADOW E&M-EST. PATIENT-LVL III: ICD-10-PCS | Mod: PBBFAC,,, | Performed by: PHYSICIAN ASSISTANT

## 2022-04-13 PROCEDURE — 99999 PR PBB SHADOW E&M-EST. PATIENT-LVL III: CPT | Mod: PBBFAC,,, | Performed by: PHYSICIAN ASSISTANT

## 2022-04-13 PROCEDURE — 69210 PR REMOVAL IMPACTED CERUMEN REQUIRING INSTRUMENTATION, UNILATERAL: ICD-10-PCS | Mod: S$PBB,,, | Performed by: PHYSICIAN ASSISTANT

## 2022-04-13 PROCEDURE — 69210 REMOVE IMPACTED EAR WAX UNI: CPT | Mod: PBBFAC | Performed by: PHYSICIAN ASSISTANT

## 2022-04-13 NOTE — PROGRESS NOTES
Subjective:   Cerumen impactions     Patient ID: Herminio Turner is a 21 y.o. male.    Chief Complaint:  Excessive ear wax     Herminio Turner is a 21 y.o. male here to see me today for evaluation of a possible wax impaction in right >> left ears.  He complains of fullness in both ears over past two weeks but denies any hearing loss in the affected ears.  He denies any ear pain or drainage.  This has been an issue in the past.  He was last here for ear cleaning about 2 months ago.  The patient has been using Debrox to soften the wax PRN (not consistently).  He had tubes in childhood.  Denies any tinnitus or dizziness.    HPI  Review of Systems   HENT: Positive for ear fullness. Negative for ear discharge, ear pain and tinnitus.        Objective:     Physical Exam   HENT:   Right Ear: External ear and ear canal normal. Decreased hearing is noted.   Left Ear: External ear and ear canal normal.  Scant amount of soft cerumen in inferior EAC near meatus.  RIGHT moderate cerumen impaction (soft cerumen), removal described below       Procedure Note    CHIEF COMPLAINT:  Cerumen Impaction    Description:  The patient was seated in an exam chair.  An ear speculum was placed in the right EAC and was examined under the microscope.  Suction and/or loop curettes were used to remove a large cerumen impaction.  The tympanic membrane was visualized and was normal in appearance.  Trace amount of residual cerumen adherent to anterior inferior TM.  The procedure was repeated on the left side in a similar fashion.  The TM was intact and normal on this side as well.  The patient tolerated the procedure well.           Assessment:     1. Impacted cerumen of right ear        Plan:     1.  Cerumen impaction:  Removed today without difficulty.  I would recommend the use of a wax softening drop, either over the counter Debrox or mineral oil, on a weekly basis.  I also instructed the patient to avoid Qtips.  RTC as needed.

## 2022-04-20 ENCOUNTER — PATIENT MESSAGE (OUTPATIENT)
Dept: PSYCHIATRY | Facility: CLINIC | Age: 22
End: 2022-04-20
Payer: OTHER GOVERNMENT

## 2022-04-26 ENCOUNTER — PATIENT MESSAGE (OUTPATIENT)
Dept: PSYCHIATRY | Facility: CLINIC | Age: 22
End: 2022-04-26
Payer: OTHER GOVERNMENT

## 2022-05-08 ENCOUNTER — OFFICE VISIT (OUTPATIENT)
Dept: URGENT CARE | Facility: CLINIC | Age: 22
End: 2022-05-08
Payer: OTHER GOVERNMENT

## 2022-05-08 VITALS
SYSTOLIC BLOOD PRESSURE: 129 MMHG | RESPIRATION RATE: 20 BRPM | OXYGEN SATURATION: 97 % | HEIGHT: 75 IN | DIASTOLIC BLOOD PRESSURE: 60 MMHG | HEART RATE: 83 BPM | BODY MASS INDEX: 39.17 KG/M2 | TEMPERATURE: 97 F | WEIGHT: 315 LBS

## 2022-05-08 DIAGNOSIS — H60.311 ACUTE DIFFUSE OTITIS EXTERNA OF RIGHT EAR: ICD-10-CM

## 2022-05-08 DIAGNOSIS — H92.01 ACUTE OTALGIA, RIGHT: ICD-10-CM

## 2022-05-08 DIAGNOSIS — H91.8X9 OTHER SPECIFIED FORMS OF HEARING LOSS, UNSPECIFIED LATERALITY: ICD-10-CM

## 2022-05-08 DIAGNOSIS — H61.23 BILATERAL IMPACTED CERUMEN: Primary | ICD-10-CM

## 2022-05-08 PROCEDURE — 99214 PR OFFICE/OUTPT VISIT, EST, LEVL IV, 30-39 MIN: ICD-10-PCS | Mod: S$GLB,,, | Performed by: NURSE PRACTITIONER

## 2022-05-08 PROCEDURE — 99214 OFFICE O/P EST MOD 30 MIN: CPT | Mod: S$GLB,,, | Performed by: NURSE PRACTITIONER

## 2022-05-08 RX ORDER — OFLOXACIN 3 MG/ML
10 SOLUTION AURICULAR (OTIC) DAILY
Qty: 10 ML | Refills: 0 | Status: SHIPPED | OUTPATIENT
Start: 2022-05-08 | End: 2023-06-05

## 2022-05-08 NOTE — PATIENT INSTRUCTIONS
PLAN:   Administer ear irrigation  Consult ENT if no improvement  Advise avoid Q-tips  Advise use OTC DeBrox irrigation kit  Advise increase p.o. fluids--at least 64 ounces of water/juice & rest  Meds:  Floxin otic/no refills  Simple saline nasal wash  to irrigate sinuses and for congestion/runny nose.  Cool mist humidifier/vaporizer.  Practice good handwashing.  Tylenol or Ibuprofen for fever, headache and body aches.  Advise follow up with PCP in 2- 3 days for recheck  Advise go to ER if nausea, vomiting, fever, increased pain, or fail to improve with treatment.  AVS provided and reviewed with patient including supportive care, follow up, and red flag symptoms.   Patient verbalizes understanding and agrees with treatment plan. Discharged from Urgent Care in stable condition.

## 2022-05-08 NOTE — PROGRESS NOTES
"Subjective:       Patient ID: Herminio Turner is a 21 y.o. male.    Vitals:  height is 6' 3" (1.905 m) and weight is 142.9 kg (315 lb) (abnormal). His tympanic temperature is 97.3 °F (36.3 °C). His blood pressure is 129/60 and his pulse is 83. His respiration is 20 and oxygen saturation is 97%.     Chief Complaint: Otalgia (Right ear)    Pt states that right ear problem started yesterday. Pt states that he feels as if his right ear is stopped up and has difficulty hearing out of it.     Otalgia   There is pain in the right ear. The current episode started today. The problem occurs constantly. The problem has been unchanged. There has been no fever. The pain is at a severity of 0/10. Associated symptoms include hearing loss. Pertinent negatives include no abdominal pain, coughing, diarrhea, ear discharge, headaches, neck pain, rash, rhinorrhea, sore throat or vomiting. Treatments tried: debrox. The treatment provided no relief.       HENT: Positive for ear pain and hearing loss. Negative for ear discharge and sore throat.    Neck: Negative for neck pain.   Respiratory: Negative for cough.    Gastrointestinal: Negative for abdominal pain, vomiting and diarrhea.   Skin: Negative for rash.   Neurological: Negative for headaches.           Past Medical History:   Diagnosis Date    ADD (attention deficit disorder with hyperactivity) 6/18/2013    Anxiety     Depression           Social History     Socioeconomic History    Marital status: Single    Number of children: 0   Occupational History    Occupation: student   Tobacco Use    Smoking status: Never Smoker    Smokeless tobacco: Never Used   Substance and Sexual Activity    Alcohol use: No    Drug use: Never    Sexual activity: Never     Social Determinants of Health     Financial Resource Strain: Unknown    Difficulty of Paying Living Expenses: Patient refused   Food Insecurity: No Food Insecurity    Worried About Running Out of Food in the Last Year: " Never true    Ran Out of Food in the Last Year: Never true   Transportation Needs: No Transportation Needs    Lack of Transportation (Medical): No    Lack of Transportation (Non-Medical): No   Physical Activity: Insufficiently Active    Days of Exercise per Week: 4 days    Minutes of Exercise per Session: 30 min   Stress: Stress Concern Present    Feeling of Stress : To some extent   Social Connections: Unknown    Frequency of Communication with Friends and Family: More than three times a week    Frequency of Social Gatherings with Friends and Family: More than three times a week    Active Member of Clubs or Organizations: Yes    Attends Club or Organization Meetings: More than 4 times per year    Marital Status: Never    Housing Stability: Low Risk     Unable to Pay for Housing in the Last Year: No    Number of Places Lived in the Last Year: 1    Unstable Housing in the Last Year: No          Family History   Adopted: Yes   Problem Relation Age of Onset    Crohn's disease Mother     No Known Problems Father        ROS:  GENERAL: No fever, chills, fatigability or weight loss.  SKIN: No rashes, itching or changes in color or texture of skin.  HEENT:  Hard of hearing, right ear pain, possible ear wax impaction. Visual acuity fine. No photophobia, ocular pain or diplopia. No loss of smell, no epistaxis or postnasal drip. No hoarseness or change in voice.   NODES: No masses or lesions. Denies swollen glands.  CHEST: Denies cyanosis, wheezing, cough and sputum production.  CARDIOVASCULAR: Denies chest pain, PND, orthopnea or reduced exercise tolerance.  ABDOMEN: Appetite fine. No weight loss. Denies diarrhea, abdominal pain  MUSCULOSKELETAL: No joint stiffness or swelling. Denies back pain.  NEUROLOGIC: No history of seizures, paralysis, alteration of gait or coordination.  PSYCHIATRIC: Denies mood swings, depression or suicidal thoughts.    PE:   APPEARANCE: Well nourished, well developed, in mild  distress.  Temp 97.3, pulse ox 97%  V/S: Reviewed.  SKIN: Normal skin turgor, no lesions.  HEENT:  turbinates pink, pink pharynx, right tragus with minimal tenderness to touch, bilateral ear canals with soft cerumen impaction, cerumen removed with gentle warm water ear irrigation, TM poor light reflex bilateral after irrigation,  Regained hearing after irrigation  CHEST:  No respiratory symptoms  CARDIOVASCULAR: Regular rate and rhythm   MUSCULOSKELETAL: Motor: 5/5 strength major flexors/extensors.  NEUROLOGIC: No sensory deficits. Gait & Posture: Normal gait and fine motion. No cerebellar signs.  MENTAL STATUS: Patient alert, oriented x 3 & conversant.    PLAN:   Administer ear irrigation/improved  Consult ENT if no improvement  Advise avoid Q-tips  Advise use OTC DeBrox irrigation kit  Advise increase p.o. fluids--at least 64 ounces of water/juice & rest  Meds:  Floxin otic/no refills  Simple saline nasal wash  to irrigate sinuses and for congestion/runny nose.  Cool mist humidifier/vaporizer.  Practice good handwashing.  Tylenol or Ibuprofen for fever, headache and body aches.  Advise follow up with PCP in 2- 3 days for recheck  Advise go to ER if nausea, vomiting, fever, increased pain, or fail to improve with treatment.  AVS provided and reviewed with patient including supportive care, follow up, and red flag symptoms.   Patient verbalizes understanding and agrees with treatment plan. Discharged from Urgent Care in stable condition.      DIAGNOSIS:  Hearing loss  Right otalgia vs otitis externa  Bilateral cerumen impaction

## 2022-05-11 ENCOUNTER — PATIENT MESSAGE (OUTPATIENT)
Dept: OTOLARYNGOLOGY | Facility: CLINIC | Age: 22
End: 2022-05-11
Payer: OTHER GOVERNMENT

## 2022-05-11 ENCOUNTER — TELEPHONE (OUTPATIENT)
Dept: URGENT CARE | Facility: CLINIC | Age: 22
End: 2022-05-11
Payer: OTHER GOVERNMENT

## 2022-05-23 ENCOUNTER — PATIENT MESSAGE (OUTPATIENT)
Dept: PSYCHIATRY | Facility: CLINIC | Age: 22
End: 2022-05-23
Payer: OTHER GOVERNMENT

## 2022-05-24 ENCOUNTER — PATIENT MESSAGE (OUTPATIENT)
Dept: PSYCHIATRY | Facility: CLINIC | Age: 22
End: 2022-05-24
Payer: OTHER GOVERNMENT

## 2022-05-25 ENCOUNTER — PATIENT MESSAGE (OUTPATIENT)
Dept: PSYCHIATRY | Facility: CLINIC | Age: 22
End: 2022-05-25
Payer: OTHER GOVERNMENT

## 2022-05-25 DIAGNOSIS — F90.9 ATTENTION DEFICIT DISORDER WITH HYPERACTIVITY: ICD-10-CM

## 2022-05-25 DIAGNOSIS — F31.9 BIPOLAR 1 DISORDER: Primary | ICD-10-CM

## 2022-05-25 DIAGNOSIS — F41.9 ANXIETY DISORDER, UNSPECIFIED TYPE: ICD-10-CM

## 2022-05-25 DIAGNOSIS — F66 GENDER IDENTITY UNCERTAINTY: ICD-10-CM

## 2022-05-25 DIAGNOSIS — F84.0 AUTISM SPECTRUM DISORDER ASSOCIATED WITH NEURODEVELOPMENTAL, MENTAL OR BEHAVIORAL DISORDER, REQUIRING SUPPORT (LEVEL 1): ICD-10-CM

## 2022-05-25 NOTE — TELEPHONE ENCOUNTER
Brenton Mr Turner (Herminio):     Ms Olivia Avina LMSW  And / or Aicha Jaison LPN will  be contacting you  to coordinate IOP placement     As mentioned often takes a few days to coordinate.      If In the interim, any acute concerns of thoughts of harm: THEN (as discussed on phone ) you are instructed to call 911 or go to nearest ER.     If for some reason you do not hear back from Ms Avina or Ms Blackwood, please recontact us:  [phone] and / or message via My Chart     Sincerely,  D Post MD  remember healthy self care:   eat right  attempt adequate rest   HANDWASHING / encourage such varsha. During this corona virus time   walk or light exercise within reason and as your general med team approves  read or explore any of reference materials / homework mentioned  reach out (I.e.,  connect with)  others who nuture and bring out best in you  avoid risky behaviors  keep your appointments  IF you  cannot make your appt THEN please call or go online (via My Chart jamaal) to reschedule.  avoid  alcohol and illicit substances.  Look for the positive.  All is often relative-seek balance  Call sooner if needed : [phone]   Call 911 or go to Emergency Room  (ER)  if  any acute concerns

## 2022-05-25 NOTE — PROGRESS NOTES
"  21 yr old male / Pearl BARBER has seen only x 2 / (eval and 1 follow up). He is autistic tho also bipolar. (Dad is [employer] rep); pt and mom dad have good rapport. Pt is ADOPTED; bio mom bipolar /     Pt sent recent message in saying " I need to see you like, ASAP. Arielle been having thoughts of hurting myself" tho says not interested go to hospital unless absolutely necessary. Pearl BARBER called pt (Aicha Blackwood LPN present); pt composed on phone; calm; goal directed no plan of harm; tho says been rather depressed; offered IOP and he would like such; has weapon THO in dad gun safe. With pt permission Pearl BARBER also called dad to inform of plan ; dad ok with plan as well / thanked me for calling. Told pt that Ms Rashidkaushik Fabrice ORTEGA  And / or Aicha Blackwood LPN would be calling to coordinate IOP placement  "

## 2022-05-25 NOTE — TELEPHONE ENCOUNTER
Pt called personally by Pearl BARBER:  He agrees to  Intensive Outpatient Program (IOP)  Referral    As well message sent via In Box Message (as below):    Brenton Avina LMROMEL  And / or Aicha Blackwood LPN will  be contacting you  to coordinate IOP placement    As mentioned often takes a few days to coordinate.     If In the interim, any acute concerns of thoughts of harm: THEN (as discussed on phone ) you are instructed to call 911 or go to nearest ER.    If for some reason you do not hear back from Ms Avina or Ms Blackwood, please recontact us:  [phone] and / or message via My Chart    Sincerely,  FRANK Lovell MD

## 2022-05-26 ENCOUNTER — OFFICE VISIT (OUTPATIENT)
Dept: PSYCHIATRY | Facility: CLINIC | Age: 22
End: 2022-05-26
Payer: OTHER GOVERNMENT

## 2022-05-26 DIAGNOSIS — Z09 NEED FOR CASE MANAGEMENT FOLLOW-UP: Primary | ICD-10-CM

## 2022-05-26 NOTE — PROGRESS NOTES
Department of Psychiatry  Case Management Follow-Up      The patient location is: Residence  The chief complaint leading to consultation is: Intensive Outpatient Therapy Recommended  Visit type: audiovisual  60 minutes of total time spent on the encounter, which includes face to face time and non-face to face time preparing to see the patient (eg, review of referral), Obtaining and/or reviewing separately obtained history, Documenting information in the electronic or other health record, Independently interpreting results of research (not separately reported) and communicating results to the patient/family/caregiver.  Each patient to whom he or she provides medical services by telemedicine is:  (1) informed of the relationship between the physician and patient and the respective role of any other health care provider with respect to management of the patient; and (2) notified that he or she may decline to receive medical services by telemedicine and may withdraw from such care at any time.      Patient Name and   Herminio Turner, 2000    Referring Provider  Dusty Lovell MD    Diagnosis  1. Need for case management follow-up         Notes    SW met with Patient via telehealth on 22 to follow up after Pt was seen by the psychiatric physician. SW explained role and reviewed the referral.        The patient agreed with the referral to case management. SW explored options for intensive outpatient therapy with the Pt. Patient reported that he would like a referral sent to [facility]. ROMEL sent a referral via fax to the agency for [facility] services. ROMEL will remain available.    Resources  [facility]  Address: [address] [place] [id number]  Phone: [phone]     Total Time  60 minutes

## 2022-05-26 NOTE — LETTER
May 26, 2022        Dusty Lovell MD  [address] [facility]  [place] [address]             [facility]  [id number] [place] [address] [address] [place] [address]  Phone: [phone]  Fax: [phone]   Patient: Herminio Turner   MR Number: [id number]   YOB: 2000   Date of Visit: 5/26/2022     Dear Dr. Lovell,       Patient agreed with referral to attend an intensive outpatient program. Pt stated that he prefers: [facility]. A referral was forwarded to the agency for intake on 05/26/22.     ([facility] insurance) will not approve the service unless a referral comes from the patient's PCP. I've sent the Dr. Mesa a message regarding and copied him to this communication also.    [employer] routed the fax confirmation and the agencys information to Belinda Mcelroy for follow-up procedures. Please advise if any other assistance is needed.           Sincerely,      Olivia Avina LMSW

## 2022-05-27 ENCOUNTER — DOCUMENTATION ONLY (OUTPATIENT)
Dept: PSYCHIATRY | Facility: CLINIC | Age: 22
End: 2022-05-27
Payer: OTHER GOVERNMENT

## 2022-05-27 NOTE — PROGRESS NOTES
"ROMEL sent a secure chat to Patient's PCP Giancarlo Mesa MD on 05/26/22 approximately 11:49am and informed of referral needed to [facility] for intensive outpatient therapy.   Message: " Pt attached was referred to intensive outpatient therapy at [facility], from our Psych Dept, but his insurance([facility]) will not approve the service unless the referral comes from his PCP....                                                                                                                    Agency:  [facility] NPI-[id number]  CPT: [id number]                     Address: [address] [place] [address]  Phone: [phone] "    "

## 2022-05-28 ENCOUNTER — PATIENT MESSAGE (OUTPATIENT)
Dept: INTERNAL MEDICINE | Facility: CLINIC | Age: 22
End: 2022-05-28
Payer: OTHER GOVERNMENT

## 2022-05-31 ENCOUNTER — DOCUMENTATION ONLY (OUTPATIENT)
Dept: PSYCHIATRY | Facility: CLINIC | Age: 22
End: 2022-05-31
Payer: OTHER GOVERNMENT

## 2022-05-31 NOTE — PROGRESS NOTES
Antony contacted Nicole Dumont( [facility]) on 05/31 at about 11:15am, she confirmed that the patient can start intensive outpatient therapy this week and she will reach out to get him started.     The patient's insurance company was contacted, and the PCP referral isn't necessary for this patient's [facility] tier.     ROMEL contacted Patient at about 12:51pm on 05/31, he informed SW he was contacted by  [facility]. Patient reported that he asked to begin the IOP after he's back from June 18th vacation. SW will remain available.

## 2022-06-07 ENCOUNTER — OFFICE VISIT (OUTPATIENT)
Dept: PSYCHIATRY | Facility: CLINIC | Age: 22
End: 2022-06-07
Payer: OTHER GOVERNMENT

## 2022-06-07 VITALS
HEART RATE: 117 BPM | DIASTOLIC BLOOD PRESSURE: 83 MMHG | WEIGHT: 315 LBS | BODY MASS INDEX: 39.9 KG/M2 | SYSTOLIC BLOOD PRESSURE: 117 MMHG

## 2022-06-07 DIAGNOSIS — F31.9 BIPOLAR 1 DISORDER: ICD-10-CM

## 2022-06-07 DIAGNOSIS — F90.9 ATTENTION DEFICIT DISORDER WITH HYPERACTIVITY: ICD-10-CM

## 2022-06-07 PROCEDURE — 99215 OFFICE O/P EST HI 40 MIN: CPT | Mod: S$PBB,,, | Performed by: PSYCHIATRY & NEUROLOGY

## 2022-06-07 PROCEDURE — 99999 PR PBB SHADOW E&M-EST. PATIENT-LVL II: ICD-10-PCS | Mod: PBBFAC,,, | Performed by: PSYCHIATRY & NEUROLOGY

## 2022-06-07 PROCEDURE — 99212 OFFICE O/P EST SF 10 MIN: CPT | Mod: PBBFAC | Performed by: PSYCHIATRY & NEUROLOGY

## 2022-06-07 PROCEDURE — 99215 PR OFFICE/OUTPT VISIT, EST, LEVL V, 40-54 MIN: ICD-10-PCS | Mod: S$PBB,,, | Performed by: PSYCHIATRY & NEUROLOGY

## 2022-06-07 PROCEDURE — 99999 PR PBB SHADOW E&M-EST. PATIENT-LVL II: CPT | Mod: PBBFAC,,, | Performed by: PSYCHIATRY & NEUROLOGY

## 2022-06-07 RX ORDER — DEXTROAMPHETAMINE SACCHARATE, AMPHETAMINE ASPARTATE, DEXTROAMPHETAMINE SULFATE AND AMPHETAMINE SULFATE 2.5; 2.5; 2.5; 2.5 MG/1; MG/1; MG/1; MG/1
TABLET ORAL
Qty: 90 TABLET | Refills: 0 | Status: SHIPPED | OUTPATIENT
Start: 2022-06-22 | End: 2022-09-22

## 2022-06-07 RX ORDER — DEXTROAMPHETAMINE SULFATE, DEXTROAMPHETAMINE SACCHARATE, AMPHETAMINE ASPARTATE MONOHYDRATE, AND AMPHETAMINE SULFATE 9.375; 9.375; 9.375; 9.375 MG/1; MG/1; MG/1; MG/1
37.5 CAPSULE, EXTENDED RELEASE ORAL DAILY
Qty: 30 EACH | Refills: 0 | Status: SHIPPED | OUTPATIENT
Start: 2022-08-20 | End: 2022-09-22

## 2022-06-07 RX ORDER — DEXTROAMPHETAMINE SULFATE, DEXTROAMPHETAMINE SACCHARATE, AMPHETAMINE ASPARTATE MONOHYDRATE, AND AMPHETAMINE SULFATE 9.375; 9.375; 9.375; 9.375 MG/1; MG/1; MG/1; MG/1
37.5 CAPSULE, EXTENDED RELEASE ORAL DAILY
Qty: 30 EACH | Refills: 0 | Status: SHIPPED | OUTPATIENT
Start: 2022-07-22 | End: 2022-09-22

## 2022-06-07 RX ORDER — DEXTROAMPHETAMINE SULFATE, DEXTROAMPHETAMINE SACCHARATE, AMPHETAMINE ASPARTATE MONOHYDRATE, AND AMPHETAMINE SULFATE 9.375; 9.375; 9.375; 9.375 MG/1; MG/1; MG/1; MG/1
37.5 CAPSULE, EXTENDED RELEASE ORAL DAILY
Qty: 30 EACH | Refills: 0 | Status: SHIPPED | OUTPATIENT
Start: 2022-06-22 | End: 2022-09-24

## 2022-06-07 RX ORDER — DEXTROAMPHETAMINE SACCHARATE, AMPHETAMINE ASPARTATE, DEXTROAMPHETAMINE SULFATE AND AMPHETAMINE SULFATE 2.5; 2.5; 2.5; 2.5 MG/1; MG/1; MG/1; MG/1
TABLET ORAL
Qty: 90 TABLET | Refills: 0 | Status: SHIPPED | OUTPATIENT
Start: 2022-08-20 | End: 2022-09-22

## 2022-06-07 RX ORDER — FLUOXETINE HYDROCHLORIDE 20 MG/1
60 CAPSULE ORAL DAILY
Qty: 90 CAPSULE | Refills: 2 | Status: SHIPPED | OUTPATIENT
Start: 2022-06-07 | End: 2022-09-24 | Stop reason: SDUPTHER

## 2022-06-07 RX ORDER — DEXTROAMPHETAMINE SACCHARATE, AMPHETAMINE ASPARTATE, DEXTROAMPHETAMINE SULFATE AND AMPHETAMINE SULFATE 2.5; 2.5; 2.5; 2.5 MG/1; MG/1; MG/1; MG/1
TABLET ORAL
Qty: 90 TABLET | Refills: 0 | Status: SHIPPED | OUTPATIENT
Start: 2022-07-22 | End: 2022-09-22

## 2022-06-07 RX ORDER — LURASIDONE HYDROCHLORIDE 40 MG/1
40 TABLET, FILM COATED ORAL DAILY
Qty: 30 TABLET | Refills: 2 | Status: SHIPPED | OUTPATIENT
Start: 2022-06-07 | End: 2022-09-24

## 2022-06-07 NOTE — PATIENT INSTRUCTIONS
PLAN:     FOLLOW UP      7/28/2022 11:30 AM ESTABLISHED PATIENT [facility] - Psychiatry Dusty Lovell MD   Location Instructions:    3rd Floor     He has enrolled in [facility] (IOP)    Meds:  see after visit summary     References:     Relaxation stress reduction workbook: GODFREY Garza PhD ( used: $7-10)    Feeling Good Website: Dusty Krishnamurthy MD / www.[employer] website (free) / varsha. PODCASTS    Anxiety &  phobia workbook by SHANTI Mckeon PhD  (web retailers: used: $ 7-10)    [place]: Path to Better Sleep : https://www.veterantraining.[place].gov/insomnia/ (free)       Pt expressed appreciation for the visit today and did not have further question at this time though pt  was still informed to:     Call  if problems.    Call / Report Side Effects to Psyc MD     Encouraged to follow up with primary care / Gen Med MD for continued monitoring of general health and wellness.    Understanding was expressed; and no further concerns nor questions were raised at this time.     remember healthy self care:   eat right  attempt adequate rest   HANDWASHING / encourage such varsha. During this corona virus time   walk or light exercise within reason and as your general med team approves  read or explore any of reference materials / homework mentioned  reach out (I.e.,  connect with)  others who nuture and bring out best in you  avoid risky behaviors  keep your appointments  IF you  cannot make your appt THEN please call or go online to reschedule.  avoid  alcohol and illicit substances.  Look for the positive.  All is often relative-seek balance  Call sooner if needed : [phone]   Call 911 or go to Emergency Room  (ER)  if any acute concerns

## 2022-06-07 NOTE — PROGRESS NOTES
"  Herminio Turner   2000 06/12/2022     Disclaimer: Evaluation and treatment is based on information presented to date. Any new information may affect assessment and findings.     Location:  In Clinic     Who (in attendance) :    pt and father Tan Turner; and then Herminio himself individualy    Time 50 min    Bipolar / Autism / ADHD / Anxiety case   dad pharma rep Tan velazquez (who also officer  in [employer])     BACKGROUND INFO : See Psyc MD Ortega or excerpt at end of this note    S: Patient's Own Perception of Condition (& Side Effects): no med complaint      O:      CURRENT PRESENTATION:      When seen together: dad says Herminio quit job at [employer]    Dad also says back in March 2022:    parents wanted to teach him credit management so got him VISA with $1500 Limit tho dad says Herminio impulsively made major purchase an "maxed out card"    Q: When I ask what he bought nancy says: he bought AR -15 rifle on Internet.    Said  he had it shipped to [employer] [place] [address] and went over; filled out paperwork and took possession of rifle. I asked IF he had any intention to use such / aprticulalrly asked IF any thoughts to harm others and mentions school shootings. He denied any intentions of harming self or otehrs.    Q: when I ask where is rifle, nancy (again who is colonel in army and served in combat) says rifle is in dad's locked gun case.    Dad and Herminio have processed and mutually agreed that AR-15 to remain in dad's possession.  Herminio expressed understanding of such. Said little about this matter though agreed rifle to remain with dad.    I did tell him that I for some reason I am informed of that weapon leave from dad's gun case such may be viewed as non compliance withtreatment and may jeopardize my continued role in his treatment (as such would be not something therapeutically cata Understanding Expressed    He was calm polite     goal directed    No psychosis    No SI / no HI    He starting [facility]  " Intensive Outpatient Program (IOP) tho going to [place] with family x 1 week and resuming program.    Medication: we talked about possibility of move to [facility] or other // tho as he in [facility] program // he / dad will discuss at that venue as well.     Dad notes wt gain about 50 lb per dad while on current regimen / a regimen been on for while     Constitutional Health Concerns:      Review of Systems   Constitutional:        Tho overweight ; his efforts to lose weight paid off: lost about 6 lbs / now 311.5 lb   HENT: Negative.    Eyes: Negative.    Respiratory: Negative.    Cardiovascular: Negative.    Gastrointestinal: Negative.    Genitourinary: Negative.    Musculoskeletal: Negative.    Skin: Negative.    Neurological: Negative.    Endo/Heme/Allergies: Negative.        Vitals:    06/07/22 1307   BP: 117/83   Pulse: (!) 117       Laboratory Data  No visits with results within 1 Month(s) from this visit.   Latest known visit with results is:   Lab Visit on 01/19/2022   Component Date Value Ref Range Status    SARS Coronavirus 2 Antigen 01/19/2022 Negative  Negative Final     Acceptable 01/19/2022 Yes   Final        Mental Status Exam:     Mental Status Exam:   Appearance: casual / overweight   Oriented: x 3 /   Attitude: cooperative polite  Eye Contact: good   Behavior: calm here   Mood: says Ok   Cognition: alert  Concentration: grossly intact tho reports at times struggles / is on ADHD meds  Affect: appropriate range   Anxiety: moderate  Thought Process: goal directed   Speech: Volume : WNL   Quantity WNL   Quality: appears to openly answer questions ; mild/ mod lisp   Eye Contact: good   Threats: no SI / no HI ; never any act self harm  Psychosis: denies all   Estimate of Intellectual Function: low average   Psychosis: denies all      Musculoskeletal:  No tremor     Social: lives with parent  / likes biology / science     Patient Active Problem List   Diagnosis    ADD (attention deficit  "disorder with hyperactivity)    Anxiety disorder    Autism spectrum disorder associated with neurodevelopmental, mental or behavioral disorder, requiring support (level 1)    Right foot pain    Adult BMI 38.0-38.9 kg/sq m (6'3" and 310 lb)    Bipolar 1 disorder (Jan 25 2022: referred in on Latuda 40 mg)    Gender identity issues; in adol identified some  as female tho parents in record report that actauly no attraction to either male or female          Current Outpatient Medications:     celecoxib (CELEBREX) 200 MG capsule, Take 1 capsule (200 mg total) by mouth once daily., Disp: 30 capsule, Rfl: 3    dextroamphetamine-amphetamine (MYDAYIS) 37.5 mg CT24, Take 37.5 mg by mouth once daily., Disp: 30 each, Rfl: 0    dextroamphetamine-amphetamine (MYDAYIS) 37.5 mg CT24, Take 37.5 mg by mouth once daily., Disp: 30 each, Rfl: 0    dextroamphetamine-amphetamine (MYDAYIS) 37.5 mg CT24, Take 37.5 mg by mouth once daily., Disp: 30 each, Rfl: 0    [START ON 6/22/2022] dextroamphetamine-amphetamine (MYDAYIS) 37.5 mg CT24, Take 37.5 mg by mouth once daily., Disp: 30 each, Rfl: 0    [START ON 7/22/2022] dextroamphetamine-amphetamine (MYDAYIS) 37.5 mg CT24, Take 37.5 mg by mouth once daily., Disp: 30 each, Rfl: 0    [START ON 8/20/2022] dextroamphetamine-amphetamine (MYDAYIS) 37.5 mg CT24, Take 37.5 mg by mouth once daily., Disp: 30 each, Rfl: 0    dextroamphetamine-amphetamine 10 mg Tab, Take 2 tablets (20 mg total) by mouth once daily., Disp: 60 tablet, Rfl: 0    [START ON 6/22/2022] dextroamphetamine-amphetamine 10 mg Tab, 2 tablets in AM and one Tablet at noon, Disp: 90 tablet, Rfl: 0    [START ON 7/22/2022] dextroamphetamine-amphetamine 10 mg Tab, 2 tablets in AM and one Tablet at noon, Disp: 90 tablet, Rfl: 0    [START ON 8/20/2022] dextroamphetamine-amphetamine 10 mg Tab, 2 tablets in AM and one Tablet at noon, Disp: 90 tablet, Rfl: 0    FLUoxetine 20 MG capsule, Take 3 capsules (60 mg total) by mouth " once daily., Disp: 90 capsule, Rfl: 2    ketoconazole (NIZORAL) 2 % shampoo, Apply topically 3 (three) times a week., Disp: 120 mL, Rfl: 2    lurasidone (LATUDA) 40 mg Tab tablet, Take 1 tablet (40 mg total) by mouth once daily., Disp: 30 tablet, Rfl: 2    ofloxacin (FLOXIN) 0.3 % otic solution, Place 10 drops into both ears once daily., Disp: 10 mL, Rfl: 0    sars-cov-2, covid-19, (PFIZER COVID-19) 30 mcg/0.3 ml injection, Inject into the muscle., Disp: 0.3 mL, Rfl: 0     Social History     Tobacco Use   Smoking Status Never Smoker   Smokeless Tobacco Never Used        Review of patient's allergies indicates:  No Known Allergies     ASSESSMENT:   Encounter Diagnoses   Name Primary?    Bipolar 1 disorder (Jan 25 2022: referred in on Latuda 40 mg)     Attention deficit disorder with hyperactivity          Patient Instructions       PLAN:     FOLLOW UP      7/28/2022 11:30 AM ESTABLISHED PATIENT [facility] Psychiatry Dusty Lovell MD   Location Instructions:    3rd Floor     He has enrolled in [facility] Intensive Outpatient Program (IOP)    Meds:  see after visit summary     References:     Relaxation stress reduction workbook: GODFREY Garza PhD ( used: $7-10)    Feeling Good Website: Dusty Krishnamurthy MD / [email or web address] website (free) / varsha. PODCASTS    Anxiety &  phobia workbook by SHANTI Mckeon PhD  (web retailers: used: $ 7-10)    [place]: Path to Better Sleep : https://www.veterantraining.[place].gov/insomnia/ (free)       Pt expressed appreciation for the visit today and did not have further question at this time though pt  was still informed to:     Call  if problems.    Call / Report Side Effects to Psyc MD     Encouraged to follow up with primary care / Gen Med MD for continued monitoring of general health and wellness.    Understanding was expressed; and no further concerns nor questions were raised at this time.     remember healthy self care:   eat right  attempt adequate rest   HANDWASHING / encourage such varsha.  "During this corona virus time   walk or light exercise within reason and as your general med team approves  read or explore any of reference materials / homework mentioned  reach out (I.e.,  connect with)  others who nuture and bring out best in you  avoid risky behaviors  keep your appointments  IF you  cannot make your appt THEN please call or go online to reschedule.  avoid  alcohol and illicit substances.  Look for the positive.  All is often relative-seek balance  Call sooner if needed : [phone]   Call 911 or go to Emergency Room  (ER)  if any acute concerns        >> BACKGROUND INFO : From Admit Psyc MD Ortega  <<    Herminio Turner a 21 y.o. ADOPTED (at birth)  male  Who is: 6' 3"  and 310 lbs.  Adoption was at birth; says that it is a CLOSED adoption. Says he has been told bio mom had mental health and substance issues.     Mr Johnny TamHerminio)  presents today as referred by Giancarlo Mesa MD  [facility] PCP.      Pt tells me that  I also know his dad:  Mr Tan Turner (a very nice capable pharmaceutical  representative  who also happens to be Colonel in [employer]. His mom is Concepcion Turner; she was from [place], [place]     He had been followed by   [facility]     Referred in with diagnostics of  Autism, ADHD, Bipolar and Major Depression as well as reference to pt having Gender Identity Issues 'not knowing preference as to male or female.  Denies Substance use.     Says intent is to get all (or as many providers in [facility]) as he able.     Says he likes his meds     Prozac 20 mg x3 a day (60 mg total daily)  Latuda 40 mg  referred in on MYDAYIS (dextroamphtamine-amphetamine CT 24) 37.5 mg  And ALSO TAKES dextroamphtamine-amphetamine 10 mg (TWO) tabs =20 mg) IN ADDITION to MYDAYIS in morning; says no longer takes an additional tab later in day     D Post MD did check [place] [facility]     Mr Turner (Herminio)  Brings in several prior treatment records " from:     [facility] [address] [facility] [phone] (ph and fax) : [email or web address] Developmental report done by Leti Lawson (Pat) Psychologist #[id number] ; report of  5-13-17 when he was 16 yrs old ; in 8th grade; Herminio was said brought in for re exam of ADHD which report references was previously varsha at age 8;      SEE  TESTING Report SUBMITTED FOR SCANNING: includes in part:      Wechsler IQ; parent teacher  Rating;   ADHD Eval (Lei CPT III with teacher parent and self form  Autism testing: [place] Asperger's Disorder scale; Social Responsiveness scale  Autism Diagnostic Observation System (ADOS) and   Multidimentional Anxiety Scale for kids  MASC-2)     Some comments from Dr Bria Timmons report:   -Did not speak until 3.5 yrs old  -Diagnosed ADHD at age 8 yrs old  -Rx glasses age 9 yrs   -Phys therapy for hypotonia (at 16 yrs)  -At age 15y began having thoughts he was a female; participated in therapy with Aziza Linares LCSW to address sexual identity ; there was discussion of taking medication to block Taoist of puberty; therapist is said to have had some expertise / interest in transgender issues.'     SEE FULL REPORT (Dr Miles AS SUBMITTING FOR SCANNING ITNO EPIC   Parents reported that Herminio really has 'NO SEXUAL IDENTITY; professing no interest in boys or girls.     Herminio was reported as doing well in school; mother studies with him nightly ; he is said not to have studied on own; 'trouble understanding needs of others around him; said to be sweet and loving child; socially aggravates other students struggling with social skills; not asking teachers or other students for help; fixates on things like building own computer likes video usama and would stay on computer al anil iof allowed; little interest in socializing with otehrs around him angelica enjoys usama with people all around the world; he is said to be very literal; quirky sense humor other not  "understand.  Mom is stay at home dad is \  Amy has some speech articulation issues     On Weschler Full Scale IQ  83 (true range 79-89 placing at 13th %ile; low avg range  On another measure the General Ability Index (GAI) Herminio did significantly better and mention made of cautiously interpreting FSIQ.  On GAI he scored 95 with confidence interval of 90-101and "in average range."     WISC V scores:  Verbal Comprehension VCI 89 23%;   Visual Spatial Index VSI 92 30%ile  Fluid Reasoning Index  73%  Working memory Index WMI 79 8 %  Processing Speed Index PSI  72 3%      ADHD Assesment: Lei 3 : "eelvated T scores for ALL scales except Defiant. " Lei Probablility Score = scored 99%; indicating individuals with ADHD obtained  This probability score 99% of time.       Adaptive function Sore ABAS II composite score of 64; such reflects Herminio score as as good or btter than 1% of children his same age / extremely low.     [place] Asperger Disorder Scale GADS   Social Interaction score 9 (37%ile)  Restricted Pattern behavior score 14 (91% ile)  Cognitive Pattern score 14 (91 %ile)  Pragmatic Skills score 11 (63%ile)  Asperger quotient score  113 (81 %ile)      MMPI high on depression general apathy ; lack confidence; social isolation; other trends reflect anxiety self preoccupation and rigidity and may show overreaction to life events      Also  Porsche Paz [facility] FARIBA [email or web address] ; ph:[phone] ; fax: [phone] ; says does not see her any longer ; says saw her in 10th / 11th grade    He  Said came over here to [facility] to [facility] providers.      See packet of materials (SCANNED to Epic  ) as noted above which do support diagnosis of ADHD and autism.     His self rating scales are also recorded below.     He did endorse 9 of 13 items on Mood Disorder Questionnaire (MDQ)   Prior MH Treatment    Inpatient  None  Outpatient: In past (10th / " "11th grade) Porsche Paz MSW LCSW FARIBA [email or web address] ; ph:[phone] ; fax: [phone] and then was seeing [facility]    Physical Abuse: N  Sexual abuse  N     Psychosis: N     Substance Use:     Alcohol: says no control; tho has tried; has enjoyed tho notes was not much; maybe had 1 beer a week tho parents locked it up as a precaution (birth mom had issues alcohol and said to have used 'other drugs' and bipolar so parents being cautious)     3 wishes? : money, car / not think of a 3rd desire     PSYCHO-SOCIAL DEVELOPMENT HISTORY:      City Born: [facility]     Siblings:  Sisters: he has one adopted sister (not bio related) ; sister was adopted  from [place] when sister was  33 months      Parents : alive      Briefly Describe  your Mom: kind ; sometimes speaks before thinking  Briefly Describe your Dad: very hard working  and kind     Bio Mom: Occupation: homemaker  Bio Dad:  Occupation: pharma rep and in [employer]      Marital Status:  Single     Sexual orientation: says no strong "pull attraction to either sex" tho could find either attractive so he says that is why he says  bisexual tho says he has not had long term relationship      Education: grad [employer] ([employer] regular classes) / [employer] it ;  Went [employer] Aug to Dec 2021 (on campus)     Lutheran / Spiritual: n     Legal Issues? n  DWI ?n  [facility] time?n     Employment:  at [employer] th says looking  for another job     On Disability? n    "

## 2022-06-23 ENCOUNTER — LAB VISIT (OUTPATIENT)
Dept: PRIMARY CARE CLINIC | Facility: OTHER | Age: 22
End: 2022-06-23
Attending: INTERNAL MEDICINE
Payer: OTHER GOVERNMENT

## 2022-06-23 DIAGNOSIS — Z20.822 ENCOUNTER FOR LABORATORY TESTING FOR COVID-19 VIRUS: ICD-10-CM

## 2022-06-23 PROCEDURE — U0003 INFECTIOUS AGENT DETECTION BY NUCLEIC ACID (DNA OR RNA); SEVERE ACUTE RESPIRATORY SYNDROME CORONAVIRUS 2 (SARS-COV-2) (CORONAVIRUS DISEASE [COVID-19]), AMPLIFIED PROBE TECHNIQUE, MAKING USE OF HIGH THROUGHPUT TECHNOLOGIES AS DESCRIBED BY CMS-2020-01-R: HCPCS | Performed by: INTERNAL MEDICINE

## 2022-06-24 LAB
SARS-COV-2 RNA RESP QL NAA+PROBE: NOT DETECTED
SARS-COV-2- CYCLE NUMBER: NORMAL

## 2022-07-10 ENCOUNTER — PATIENT MESSAGE (OUTPATIENT)
Dept: INTERNAL MEDICINE | Facility: CLINIC | Age: 22
End: 2022-07-10
Payer: OTHER GOVERNMENT

## 2022-07-28 ENCOUNTER — OFFICE VISIT (OUTPATIENT)
Dept: PSYCHIATRY | Facility: CLINIC | Age: 22
End: 2022-07-28
Payer: OTHER GOVERNMENT

## 2022-07-28 VITALS
HEART RATE: 94 BPM | DIASTOLIC BLOOD PRESSURE: 81 MMHG | WEIGHT: 315 LBS | BODY MASS INDEX: 40.31 KG/M2 | SYSTOLIC BLOOD PRESSURE: 125 MMHG

## 2022-07-28 DIAGNOSIS — F66 GENDER IDENTITY UNCERTAINTY: ICD-10-CM

## 2022-07-28 DIAGNOSIS — F31.9 BIPOLAR 1 DISORDER: Primary | ICD-10-CM

## 2022-07-28 DIAGNOSIS — Z09 NEED FOR CASE MANAGEMENT FOLLOW-UP: ICD-10-CM

## 2022-07-28 DIAGNOSIS — F90.9 ATTENTION DEFICIT DISORDER WITH HYPERACTIVITY: ICD-10-CM

## 2022-07-28 DIAGNOSIS — F41.9 ANXIETY DISORDER, UNSPECIFIED TYPE: ICD-10-CM

## 2022-07-28 DIAGNOSIS — F84.0 AUTISM SPECTRUM DISORDER ASSOCIATED WITH NEURODEVELOPMENTAL, MENTAL OR BEHAVIORAL DISORDER, REQUIRING SUPPORT (LEVEL 1): ICD-10-CM

## 2022-07-28 PROCEDURE — 99999 PR PBB SHADOW E&M-EST. PATIENT-LVL II: ICD-10-PCS | Mod: PBBFAC,,, | Performed by: PSYCHIATRY & NEUROLOGY

## 2022-07-28 PROCEDURE — 99214 OFFICE O/P EST MOD 30 MIN: CPT | Mod: S$PBB,,, | Performed by: PSYCHIATRY & NEUROLOGY

## 2022-07-28 PROCEDURE — 99212 OFFICE O/P EST SF 10 MIN: CPT | Mod: PBBFAC | Performed by: PSYCHIATRY & NEUROLOGY

## 2022-07-28 PROCEDURE — 99999 PR PBB SHADOW E&M-EST. PATIENT-LVL II: CPT | Mod: PBBFAC,,, | Performed by: PSYCHIATRY & NEUROLOGY

## 2022-07-28 PROCEDURE — 99214 PR OFFICE/OUTPT VISIT, EST, LEVL IV, 30-39 MIN: ICD-10-PCS | Mod: S$PBB,,, | Performed by: PSYCHIATRY & NEUROLOGY

## 2022-07-28 NOTE — PROGRESS NOTES
Herminioadeline Turner   2000 07/28/2022     Disclaimer: Evaluation and treatment is based on information presented to date. Any new information may affect assessment and findings.     Location:  In Clinic     Who (in attendance) :    pt himself   Time 50 min    Bipolar / Autism / ADHD / Anxiety case   dad pharma rep Tan velazquez (who also officer  in [employer])     BACKGROUND INFO : See Psyc MD Ortega or excerpt at end of this note    S: Patient's Own Perception of Condition (& Side Effects): no med complaint      O:      CURRENT PRESENTATION:     Enjoyed Eduardo pt mom sister aunt     Not working at present    Still at [facility]     Says Latuda advanced to 60 mg ; adderall XR 20 mg (no longer mydais)     In regards to his purchase of AK 47 / see last note June 2022 for more detail    Says gun remain in dad gun safe; says he treatment team and dad discussed while at [facility]; says on gun purchase form asks IF ever involuntarily admitted to [facility] ; says he has not ; at present gun remains with dad who is colonel in [employer]    I did tell him that I for some reason I am informed of that weapon leave from dad's gun case such may be viewed as non compliance withtreatment and may jeopardize my continued role in his treatment (as such would be not something therapeutically cata Understanding Expressed    He was calm polite     goal directed    No psychosis    No SI / no HI  Says doing fine  He starting [facility] ([facility])  Says attends TUES WED FRI 9:30a to Noon     Medication: had discussed move to Abilify  Tho ultimate decided to move to increase Latuda to 60 mg and move to adderall XR 20 mg from mydais     [facility] // he / dad will discuss at that venue as well.     Dad notes wt gain about 50 lb per dad while on current regimen / a regimen been on for while     Says remains [facility]  ([facility]) / says not sure when will finish tho says helpign him ; counselor is Charu Carpenter mngt is at [facility] while enrolled there / today is status check / he is not at the program today     GAD7 7/28/2022 1/24/2022   1. Feeling nervous, anxious, or on edge? 1 2   2. Not being able to stop or control worrying? 1 2   3. Worrying too much about different things? 1 2   4. Trouble relaxing? 2 1   5. Being so restless that it is hard to sit still? 1 3   6. Becoming easily annoyed or irritable? 0 1   7. Feeling afraid as if something awful might happen? 0 3   8. If you checked off any problems, how difficult have these problems made it for you to do your work, take care of things at home, or get along with other people? 1 2   NAN-7 Score 6 14     GAD7 7/28/2022 1/24/2022   1. Feeling nervous, anxious, or on edge? 1 2   2. Not being able to stop or control worrying? 1 2   3. Worrying too much about different things? 1 2   4. Trouble relaxing? 2 1   5. Being so restless that it is hard to sit still? 1 3   6. Becoming easily annoyed or irritable? 0 1   7. Feeling afraid as if something awful might happen? 0 3   8. If you checked off any problems, how difficult have these problems made it for you to do your work, take care of things at home, or get along with other people? 1 2   NAN-7 Score 6 14        Depression Patient Health Questionnaire 7/28/2022 7/29/2021   Over the last two weeks how often have you been bothered by little interest or pleasure in doing things Not at all Not at all   Over the last two weeks how often have you been bothered by feeling down, depressed or hopeless Several days Not at all   PHQ-2 Total Score 1 0   Over the last two weeks how often have you been bothered by trouble falling or staying asleep, or sleeping too much More than half the days -   Over the last two weeks how often have you been bothered by feeling tired or having little energy Several days -   Over the last two weeks how often have  you been bothered by a poor appetite or overeating Several days -   Over the last two weeks how often have you been bothered by feeling bad about yourself - or that you are a failure or have let yourself or your family down More than half the days -   Over the last two weeks how often have you been bothered by trouble concentrating on things, such as reading the newspaper or watching television Several days -   Over the last two weeks how often have you been bothered by moving or speaking so slowly that other people could have noticed. Or the opposite - being so fidgety or restless that you have been moving around a lot more than usual. Not at all -   Over the last two weeks how often have you been bothered by thoughts that you would be better off dead, or of hurting yourself Not at all -   If you checked off any problems, how difficult have these problems made it for you to do your work, take care of things at home or get along with other people? Somewhat difficult -   Total Score 8 -   Interpretation Mild -       Constitutional Health Concerns:      Review of Systems   Constitutional:        Calvino overweight ; his efforts to lose weight paid off: lost about 6 lbs / now 311.5 lb   HENT: Negative.    Eyes: Negative.    Respiratory: Negative.    Cardiovascular: Negative.    Gastrointestinal: Negative.    Genitourinary: Negative.    Musculoskeletal: Negative.    Skin: Negative.    Neurological: Negative.    Endo/Heme/Allergies: Negative.        Vitals:    07/28/22 1101   BP: 125/81   Pulse: 94       Laboratory Data  No visits with results within 1 Month(s) from this visit.   Latest known visit with results is:   Lab Visit on 06/23/2022   Component Date Value Ref Range Status    SARS-CoV2 (COVID-19) Qualitative P* 06/23/2022 Not Detected  Not Detected Final    SARS-COV-2- Cycle Number 06/23/2022 N/A   Final        Mental Status Exam:   Appearance: casual / overweight   Oriented: x 3 /   Attitude: cooperative  "polite  Eye Contact: good   Behavior: calm here   Mood: says Ok   Cognition: alert  Concentration: grossly intact tho reports at times struggles / is on ADHD meds  Affect: appropriate range   Anxiety: moderate  Thought Process: goal directed   Speech: Volume : WNL   Quantity WNL   Quality: appears to openly answer questions ; mild/ mod lisp   Eye Contact: good   Threats: no SI / no HI ; never any act self harm  Psychosis: denies all   Estimate of Intellectual Function: low average   Psychosis: denies all      Musculoskeletal:  No tremor     Social: lives with parent  / likes biology / science     Patient Active Problem List   Diagnosis    ADD (attention deficit disorder with hyperactivity)    Anxiety disorder    Autism spectrum disorder associated with neurodevelopmental, mental or behavioral disorder, requiring support (level 1)    Right foot pain    Adult BMI 38.0-38.9 kg/sq m (6'3" and 310 lb)    Bipolar 1 disorder (Jan 25 2022: referred in on Latuda 40 mg)    Gender identity issues; in adol identified some  as female tho parents in record report that actauly no attraction to either male or female          Current Outpatient Medications:     celecoxib (CELEBREX) 200 MG capsule, Take 1 capsule (200 mg total) by mouth once daily., Disp: 30 capsule, Rfl: 3    dextroamphetamine-amphetamine (MYDAYIS) 37.5 mg CT24, Take 37.5 mg by mouth once daily., Disp: 30 each, Rfl: 0    dextroamphetamine-amphetamine (MYDAYIS) 37.5 mg CT24, Take 37.5 mg by mouth once daily., Disp: 30 each, Rfl: 0    dextroamphetamine-amphetamine (MYDAYIS) 37.5 mg CT24, Take 37.5 mg by mouth once daily., Disp: 30 each, Rfl: 0    dextroamphetamine-amphetamine (MYDAYIS) 37.5 mg CT24, Take 37.5 mg by mouth once daily., Disp: 30 each, Rfl: 0    dextroamphetamine-amphetamine (MYDAYIS) 37.5 mg CT24, Take 37.5 mg by mouth once daily., Disp: 30 each, Rfl: 0    [START ON 8/20/2022] dextroamphetamine-amphetamine (MYDAYIS) 37.5 mg CT24, Take " "37.5 mg by mouth once daily., Disp: 30 each, Rfl: 0    dextroamphetamine-amphetamine 10 mg Tab, 2 tablets in AM and one Tablet at noon, Disp: 90 tablet, Rfl: 0    dextroamphetamine-amphetamine 10 mg Tab, 2 tablets in AM and one Tablet at noon, Disp: 90 tablet, Rfl: 0    [START ON 8/20/2022] dextroamphetamine-amphetamine 10 mg Tab, 2 tablets in AM and one Tablet at noon, Disp: 90 tablet, Rfl: 0    FLUoxetine 20 MG capsule, Take 3 capsules (60 mg total) by mouth once daily., Disp: 90 capsule, Rfl: 2    ketoconazole (NIZORAL) 2 % shampoo, Apply topically 3 (three) times a week., Disp: 120 mL, Rfl: 2    lurasidone (LATUDA) 40 mg Tab tablet, Take 1 tablet (40 mg total) by mouth once daily., Disp: 30 tablet, Rfl: 2    ofloxacin (FLOXIN) 0.3 % otic solution, Place 10 drops into both ears once daily., Disp: 10 mL, Rfl: 0    sars-cov-2, covid-19, (PFIZER COVID-19) 30 mcg/0.3 ml injection, Inject into the muscle., Disp: 0.3 mL, Rfl: 0     Social History     Tobacco Use   Smoking Status Never Smoker   Smokeless Tobacco Never Used        Review of patient's allergies indicates:  No Known Allergies     ASSESSMENT:   Encounter Diagnoses   Name Primary?    Bipolar 1 disorder (Jan 25 2022: referred in on Latuda 40 mg) Yes    Autism spectrum disorder associated with neurodevelopmental, mental or behavioral disorder, requiring support (level 1)     Gender identity issues; in adol identified some  as female tho parents in record report that actauly no attraction to either male or female; saw counselor specific to this in past     Attention deficit disorder with hyperactivity     Anxiety disorder, unspecified type     Need for case management follow-up     Adult BMI 38.0-38.9 kg/sq m (6'3" and 310 lb)          There are no Patient Instructions on file for this visit.  >> BACKGROUND INFO : From Admit Psyc MD Ortega  <<    Herminio Telly Turner a 21 y.o. ADOPTED (at birth)  male  Who is: 6' 3"  and 310 lbs.  Adoption was at " birth; says that it is a CLOSED adoption. Says he has been told bio mom had mental health and substance issues.     Mr Johnny Mayes)  presents today as referred by Giancarlo Mesa MD  [facility] PCP.      Pt tells me that  I also know his dad:  Mr Tan Turner (a very nice capable pharmaceutical  representative  who also happens to be Colonel in [employer]. His mom is Concepcion Turner; she was from [place], [place]     He had been followed by   [facility]     Referred in with diagnostics of  Autism, ADHD, Bipolar and Major Depression as well as reference to pt having Gender Identity Issues 'not knowing preference as to male or female.  Denies Substance use.     Says intent is to get all (or as many providers in [facility]) as he able.     Says he likes his meds     Prozac 20 mg x3 a day (60 mg total daily)  Latuda 40 mg  referred in on MYDAYIS (dextroamphtamine-amphetamine CT 24) 37.5 mg  And ALSO TAKES dextroamphtamine-amphetamine 10 mg (TWO) tabs =20 mg) IN ADDITION to MYDAYIS in morning; says no longer takes an additional tab later in day     D Post MD did check [place] [facility]     Mr Johnny Mayes)  Brings in several prior treatment records from:     [facility] [address] [facility] [phone] (ph and fax) : [email or web address] Developmental report done by Leti Lawson (Pat) Psychologist #[id number] ; report of  5-13[birth year]17 when he was 16 yrs old ; in 8th grade; Herminio was said brought in for re exam of ADHD which report references was previously varsha at age 8;      SEE  TESTING Report SUBMITTED FOR SCANNING: includes in part:      Wechsler IQ; parent teacher  Rating;   ADHD Eval (Lei CPT III with teacher parent and self form  Autism testing: Anthony Asperger's Disorder scale; Social Responsiveness scale  Autism Diagnostic Observation System (ADOS) and   Multidimentional Anxiety Scale for kids  MASC-2)     Some comments from   "Bria Timmons report:   -Did not speak until 3.5 yrs old  -Diagnosed ADHD at age 8 yrs old  -Rx glasses age 9 yrs   -Phys therapy for hypotonia (at 16 yrs)  -At age 15y began having thoughts he was a female; participated in therapy with Aziza Linares LCSW to address sexual identity ; there was discussion of taking medication to block [employer] of puberty; therapist is said to have had some expertise / interest in transgender issues.'     SEE FULL REPORT (Dr Miles AS SUBMITTING FOR SCANNING ITNO EPIC   Parents reported that Herminio really has 'NO SEXUAL IDENTITY; professing no interest in boys or girls.     Herminio was reported as doing well in school; mother studies with him nightly ; he is said not to have studied on own; 'trouble understanding needs of others around him; said to be sweet and loving child; socially aggravates other students struggling with social skills; not asking teachers or other students for help; fixates on things like building own computer likes video usama and would stay on computer al anil iof allowed; little interest in socializing with otehrs around him angelica enjoys usama with people all around the world; he is said to be very literal; quirky sense humor other not understand.  Mom is stay at home dad is \  Amy has some speech articulation issues     On Weschler Full Scale IQ  83 (true range 79-89 placing at 13th %ile; low avg range  On another measure the General Ability Index (GAI) Herminio did significantly better and mention made of cautiously interpreting FSIQ.  On GAI he scored 95 with confidence interval of 90-101and "in average range."     WISC V scores:  Verbal Comprehension VCI 89 23%;   Visual Spatial Index VSI 92 30%ile  Fluid Reasoning Index  73%  Working memory Index WMI 79 8 %  Processing Speed Index PSI  72 3%      ADHD Assesment: Lei 3 : "eelvated T scores for ALL scales except Defiant. " Lei Probablility Score = scored 99%; indicating " individuals with ADHD obtained  This probability score 99% of time.       Adaptive function Sore ABAS II composite score of 64; such reflects Herminio score as as good or btter than 1% of children his same age / extremely low.     Giliam Asperger Disorder Scale GADS   Social Interaction score 9 (37%ile)  Restricted Pattern behavior score 14 (91% ile)  Cognitive Pattern score 14 (91 %ile)  Pragmatic Skills score 11 (63%ile)  Asperger quotient score  113 (81 %ile)      MMPI high on depression general apathy ; lack confidence; social isolation; other trends reflect anxiety self preoccupation and rigidity and may show overreaction to life events      Also  Porsche Paz MSW [facility] FARIBA [email or web address] ; ph:[phone] ; fax: [phone] ; says does not see her any longer ; says saw her in 10th / 11th grade    He  Said came over here to [facility] to [facility] care providers.      See packet of materials (SCANNED to Epic  ) as noted above which do support diagnosis of ADHD and autism.     His self rating scales are also recorded below.     He did endorse 9 of 13 items on Mood Disorder Questionnaire (MDQ)   Prior  Treatment    Inpatient  None  Outpatient: In past (10th / 11th grade) Porsche Paz [facility] FARIBA [email or web address] ; ph:[phone] ; fax: [phone] and then was seeing [facility]    Physical Abuse: N  Sexual abuse  N     Psychosis: N     Substance Use:     Alcohol: says no control; tho has tried; has enjoyed tho notes was not much; maybe had 1 beer a week tho parents locked it up as a precaution (birth mom had issues alcohol and said to have used 'other drugs' and bipolar so parents being cautious)     3 wishes? : money, car / not think of a 3rd desire     PSYCHO-SOCIAL DEVELOPMENT HISTORY:      [place] [facility] [place]     Siblings:  Sisters: he has one adopted sister (not bio related) ; sister was adopted  from [place] when  "sister was  33 months      Parents : alive      Briefly Describe  your Mom: kind ; sometimes speaks before thinking  Briefly Describe your Dad: very hard working  and kind     Bio Mom: Occupation: homemaker  Bio Dad:  Occupation: pharma rep and in [employer]      Marital Status:  Single     Sexual orientation: says no strong "pull attraction to either sex" tho could find either attractive so he says that is why he says  bisexual tho says he has not had long term relationship      Education: grad [employer] ([employer] regular classes) / [employer] ;  Went [employer] Aug to Dec 2021 (on campus)     Adventism / Spiritual: n     Legal Issues? n  DWI ?n  assisted time?n     Employment:  at [employer] says looking  for another job     On Disability? n      "

## 2022-08-17 ENCOUNTER — PATIENT MESSAGE (OUTPATIENT)
Dept: PRIMARY CARE CLINIC | Facility: CLINIC | Age: 22
End: 2022-08-17
Payer: OTHER GOVERNMENT

## 2022-08-17 DIAGNOSIS — R79.9 ABNORMAL SERUM TOTAL PROTEIN LEVEL: Primary | ICD-10-CM

## 2022-08-18 ENCOUNTER — LAB VISIT (OUTPATIENT)
Dept: LAB | Facility: HOSPITAL | Age: 22
End: 2022-08-18
Attending: INTERNAL MEDICINE
Payer: OTHER GOVERNMENT

## 2022-08-18 DIAGNOSIS — R79.9 ABNORMAL SERUM TOTAL PROTEIN LEVEL: ICD-10-CM

## 2022-08-18 LAB
ALBUMIN SERPL BCP-MCNC: 3.7 G/DL (ref 3.5–5.2)
ALP SERPL-CCNC: 92 U/L (ref 55–135)
ALT SERPL W/O P-5'-P-CCNC: 50 U/L (ref 10–44)
ANION GAP SERPL CALC-SCNC: 10 MMOL/L (ref 8–16)
AST SERPL-CCNC: 32 U/L (ref 10–40)
BASOPHILS # BLD AUTO: 0.02 K/UL (ref 0–0.2)
BASOPHILS NFR BLD: 0.4 % (ref 0–1.9)
BILIRUB SERPL-MCNC: 0.5 MG/DL (ref 0.1–1)
BUN SERPL-MCNC: 10 MG/DL (ref 6–20)
CALCIUM SERPL-MCNC: 9.3 MG/DL (ref 8.7–10.5)
CHLORIDE SERPL-SCNC: 104 MMOL/L (ref 95–110)
CO2 SERPL-SCNC: 24 MMOL/L (ref 23–29)
CREAT SERPL-MCNC: 0.7 MG/DL (ref 0.5–1.4)
DIFFERENTIAL METHOD: ABNORMAL
EOSINOPHIL # BLD AUTO: 0.1 K/UL (ref 0–0.5)
EOSINOPHIL NFR BLD: 1.4 % (ref 0–8)
ERYTHROCYTE [DISTWIDTH] IN BLOOD BY AUTOMATED COUNT: 12.2 % (ref 11.5–14.5)
EST. GFR  (NO RACE VARIABLE): >60 ML/MIN/1.73 M^2
GLUCOSE SERPL-MCNC: 90 MG/DL (ref 70–110)
HCT VFR BLD AUTO: 41.3 % (ref 40–54)
HGB BLD-MCNC: 13.3 G/DL (ref 14–18)
IMM GRANULOCYTES # BLD AUTO: 0.02 K/UL (ref 0–0.04)
IMM GRANULOCYTES NFR BLD AUTO: 0.4 % (ref 0–0.5)
LYMPHOCYTES # BLD AUTO: 2 K/UL (ref 1–4.8)
LYMPHOCYTES NFR BLD: 35.8 % (ref 18–48)
MCH RBC QN AUTO: 27.1 PG (ref 27–31)
MCHC RBC AUTO-ENTMCNC: 32.2 G/DL (ref 32–36)
MCV RBC AUTO: 84 FL (ref 82–98)
MONOCYTES # BLD AUTO: 0.5 K/UL (ref 0.3–1)
MONOCYTES NFR BLD: 8.5 % (ref 4–15)
NEUTROPHILS # BLD AUTO: 3 K/UL (ref 1.8–7.7)
NEUTROPHILS NFR BLD: 53.5 % (ref 38–73)
NRBC BLD-RTO: 0 /100 WBC
PLATELET # BLD AUTO: 290 K/UL (ref 150–450)
PMV BLD AUTO: 10.5 FL (ref 9.2–12.9)
POTASSIUM SERPL-SCNC: 4 MMOL/L (ref 3.5–5.1)
PROT SERPL-MCNC: 6.4 G/DL (ref 6–8.4)
RBC # BLD AUTO: 4.9 M/UL (ref 4.6–6.2)
SODIUM SERPL-SCNC: 138 MMOL/L (ref 136–145)
WBC # BLD AUTO: 5.56 K/UL (ref 3.9–12.7)

## 2022-08-18 PROCEDURE — 84165 PATHOLOGIST INTERPRETATION SPE: ICD-10-PCS | Mod: 26,,, | Performed by: PATHOLOGY

## 2022-08-18 PROCEDURE — 84165 PROTEIN E-PHORESIS SERUM: CPT | Performed by: INTERNAL MEDICINE

## 2022-08-18 PROCEDURE — 85025 COMPLETE CBC W/AUTO DIFF WBC: CPT | Performed by: INTERNAL MEDICINE

## 2022-08-18 PROCEDURE — 84165 PROTEIN E-PHORESIS SERUM: CPT | Mod: 26,,, | Performed by: PATHOLOGY

## 2022-08-18 PROCEDURE — 36415 COLL VENOUS BLD VENIPUNCTURE: CPT | Performed by: INTERNAL MEDICINE

## 2022-08-18 PROCEDURE — 80053 COMPREHEN METABOLIC PANEL: CPT | Performed by: INTERNAL MEDICINE

## 2022-08-19 ENCOUNTER — TELEPHONE (OUTPATIENT)
Dept: INTERNAL MEDICINE | Facility: CLINIC | Age: 22
End: 2022-08-19
Payer: OTHER GOVERNMENT

## 2022-08-19 LAB
ALBUMIN SERPL ELPH-MCNC: 3.7 G/DL (ref 3.35–5.55)
ALPHA1 GLOB SERPL ELPH-MCNC: 0.32 G/DL (ref 0.17–0.41)
ALPHA2 GLOB SERPL ELPH-MCNC: 0.91 G/DL (ref 0.43–0.99)
B-GLOBULIN SERPL ELPH-MCNC: 0.65 G/DL (ref 0.5–1.1)
GAMMA GLOB SERPL ELPH-MCNC: 0.51 G/DL (ref 0.67–1.58)
PATHOLOGIST INTERPRETATION SPE: NORMAL
PROT SERPL-MCNC: 6.1 G/DL (ref 6–8.4)

## 2022-08-19 NOTE — TELEPHONE ENCOUNTER
----- Message from Giancarlo Mesa MD sent at 8/19/2022  6:45 AM CDT -----  Labs look okay.  Protein analysis is pending.

## 2022-08-19 NOTE — TELEPHONE ENCOUNTER
Spoke with patient, and informed him that his labs are good and that we are still waiting for his protein analysis to come back because it's still pending.

## 2022-08-29 ENCOUNTER — PATIENT MESSAGE (OUTPATIENT)
Dept: PSYCHIATRY | Facility: CLINIC | Age: 22
End: 2022-08-29
Payer: OTHER GOVERNMENT

## 2022-08-31 ENCOUNTER — OFFICE VISIT (OUTPATIENT)
Dept: OTOLARYNGOLOGY | Facility: CLINIC | Age: 22
End: 2022-08-31
Payer: OTHER GOVERNMENT

## 2022-08-31 VITALS — TEMPERATURE: 97 F

## 2022-08-31 DIAGNOSIS — H61.21 IMPACTED CERUMEN OF RIGHT EAR: Primary | ICD-10-CM

## 2022-08-31 PROCEDURE — 69210 REMOVE IMPACTED EAR WAX UNI: CPT | Mod: PBBFAC | Performed by: PHYSICIAN ASSISTANT

## 2022-08-31 PROCEDURE — 99999 PR PBB SHADOW E&M-EST. PATIENT-LVL III: ICD-10-PCS | Mod: PBBFAC,,, | Performed by: PHYSICIAN ASSISTANT

## 2022-08-31 PROCEDURE — 99999 PR PBB SHADOW E&M-EST. PATIENT-LVL III: CPT | Mod: PBBFAC,,, | Performed by: PHYSICIAN ASSISTANT

## 2022-08-31 PROCEDURE — 99499 NO LOS: ICD-10-PCS | Mod: S$PBB,,, | Performed by: PHYSICIAN ASSISTANT

## 2022-08-31 PROCEDURE — 99499 UNLISTED E&M SERVICE: CPT | Mod: S$PBB,,, | Performed by: PHYSICIAN ASSISTANT

## 2022-08-31 PROCEDURE — 69210 PR REMOVAL IMPACTED CERUMEN REQUIRING INSTRUMENTATION, UNILATERAL: ICD-10-PCS | Mod: S$PBB,,, | Performed by: PHYSICIAN ASSISTANT

## 2022-08-31 PROCEDURE — 99213 OFFICE O/P EST LOW 20 MIN: CPT | Mod: PBBFAC | Performed by: PHYSICIAN ASSISTANT

## 2022-08-31 PROCEDURE — 69210 REMOVE IMPACTED EAR WAX UNI: CPT | Mod: S$PBB,,, | Performed by: PHYSICIAN ASSISTANT

## 2022-08-31 NOTE — PROGRESS NOTES
Subjective:   Cerumen impactions     Patient ID: Herminio Turner is a 22 y.o. male.    Chief Complaint:  Excessive ear wax     Herminio Turner is a 22 y.o. male here to see me today for evaluation of a possible wax impaction in right ear.  He has complaints of hearing loss in the affected ear, but denies pain or drainage.  This has been an issue in the past.  The patient has not been using any sort of ear drop to soften the wax.  Reports that the right ear has felt stuffy for past 2 days.  He was last here for ear cleaning in 4/2022.    HPI  Review of Systems   HENT: Positive for hearing loss. Negative for ear discharge, ear pain and tinnitus.        Objective:     Physical Exam   HENT:   Right Ear: External ear and ear canal normal. Decreased hearing is noted.   Left Ear: External ear and ear canal normal. Tympanic membrane is normal in appearance.  RIGHT complete cerumen impaction (soft wax), removal described below       Procedure Note    CHIEF COMPLAINT:  Cerumen Impaction    Description:  The patient was seated in an exam chair.  An ear speculum was placed in the right EAC and was examined under the microscope.  Suction and/or loop curettes were used to remove a large cerumen impaction.  The tympanic membrane was visualized and was normal in appearance.   The patient tolerated the procedure well.           Assessment:     1. Impacted cerumen of right ear        Plan:     1.  Cerumen impaction:  Removed today without difficulty.  I would recommend the use of a wax softening drop, either over the counter Debrox or mineral oil, on a weekly basis.  I also instructed the patient to avoid Qtips.  He notes immediate improvement in his hearing once cerumen removed today.  RTC as needed.      Answers submitted by the patient for this visit:  Review of Symptoms Questionnaire  (Submitted on 8/31/2022)  None of these: Yes  hearing loss: Yes  None of these : Yes  None of these: Yes  None of these : Yes  None of  these: Yes  None of these: Yes  None of these: Yes  None of these : Yes  None of these: Yes  None of these : Yes  None of these: Yes  None of these: Yes  None of these: Yes

## 2022-09-18 ENCOUNTER — PATIENT MESSAGE (OUTPATIENT)
Dept: PSYCHIATRY | Facility: CLINIC | Age: 22
End: 2022-09-18
Payer: OTHER GOVERNMENT

## 2022-09-19 DIAGNOSIS — F90.9 ATTENTION DEFICIT DISORDER WITH HYPERACTIVITY: ICD-10-CM

## 2022-09-19 DIAGNOSIS — F31.9 BIPOLAR 1 DISORDER: ICD-10-CM

## 2022-09-19 DIAGNOSIS — L21.9 SEBORRHEIC DERMATITIS: ICD-10-CM

## 2022-09-19 RX ORDER — LURASIDONE HYDROCHLORIDE 40 MG/1
40 TABLET, FILM COATED ORAL DAILY
Qty: 30 TABLET | Refills: 2 | Status: CANCELLED | OUTPATIENT
Start: 2022-09-19 | End: 2022-12-18

## 2022-09-21 ENCOUNTER — OFFICE VISIT (OUTPATIENT)
Dept: OTOLARYNGOLOGY | Facility: CLINIC | Age: 22
End: 2022-09-21
Payer: OTHER GOVERNMENT

## 2022-09-21 VITALS — TEMPERATURE: 98 F

## 2022-09-21 DIAGNOSIS — H61.21 CERUMINOSIS, RIGHT: Primary | ICD-10-CM

## 2022-09-21 PROCEDURE — 99213 OFFICE O/P EST LOW 20 MIN: CPT | Mod: PBBFAC | Performed by: PHYSICIAN ASSISTANT

## 2022-09-21 PROCEDURE — 69210 PR REMOVAL IMPACTED CERUMEN REQUIRING INSTRUMENTATION, UNILATERAL: ICD-10-PCS | Mod: S$PBB,,, | Performed by: PHYSICIAN ASSISTANT

## 2022-09-21 PROCEDURE — 69210 REMOVE IMPACTED EAR WAX UNI: CPT | Mod: S$PBB,,, | Performed by: PHYSICIAN ASSISTANT

## 2022-09-21 PROCEDURE — 99499 NO LOS: ICD-10-PCS | Mod: S$PBB,,, | Performed by: PHYSICIAN ASSISTANT

## 2022-09-21 PROCEDURE — 69210 REMOVE IMPACTED EAR WAX UNI: CPT | Mod: PBBFAC | Performed by: PHYSICIAN ASSISTANT

## 2022-09-21 PROCEDURE — 99499 UNLISTED E&M SERVICE: CPT | Mod: S$PBB,,, | Performed by: PHYSICIAN ASSISTANT

## 2022-09-21 PROCEDURE — 99999 PR PBB SHADOW E&M-EST. PATIENT-LVL III: ICD-10-PCS | Mod: PBBFAC,,, | Performed by: PHYSICIAN ASSISTANT

## 2022-09-21 PROCEDURE — 99999 PR PBB SHADOW E&M-EST. PATIENT-LVL III: CPT | Mod: PBBFAC,,, | Performed by: PHYSICIAN ASSISTANT

## 2022-09-21 NOTE — PROGRESS NOTES
Subjective:   Cerumen impactions     Patient ID: Herminio Turner is a 22 y.o. male.    Chief Complaint:  Excessive ear wax     Herminio Turner is a 22 y.o. male here to see me today for evaluation of a possible wax impaction in right ear.  He has complaints of hearing loss in the affected ear, but denies pain or drainage.  This has been an issue in the past.  The patient has not been using any sort of ear drop to soften the wax.    HPI  Review of Systems   HENT: Positive for hearing loss. Negative for ear discharge, ear pain and tinnitus.        Objective:     Physical Exam   HENT:   Right Ear: External ear and ear canal normal. Decreased hearing is noted.   Left Ear: External ear and ear canal normal.  Normal tympanic membrane.  RIGHT moderate soft cerumen impaction, removal described below       Procedure Note    CHIEF COMPLAINT:  Cerumen Impaction    Description:  The patient was seated in an exam chair.  An ear speculum was placed in the right EAC and was examined under the microscope.  Suction and/or loop curettes were used to remove a moderate cerumen impaction.  The tympanic membrane was visualized and was normal in appearance with thin layer of cerumen adherent to posterior TM.  The patient tolerated the procedure well.           Assessment:     1. Ceruminosis, right        Plan:     1.  Cerumen impaction:  Removed today without difficulty.  I would recommend the use of a wax softening drop, either over the counter Debrox or mineral oil, on a nightly basis to loosen thin layer of cerumen adherent to TM.  I also instructed the patient to avoid Qtips.  He notes immediate improvement in his hearing once cerumen removed today.  RTC as needed.

## 2022-09-22 ENCOUNTER — PATIENT MESSAGE (OUTPATIENT)
Dept: PSYCHIATRY | Facility: CLINIC | Age: 22
End: 2022-09-22
Payer: OTHER GOVERNMENT

## 2022-09-22 DIAGNOSIS — F31.9 BIPOLAR 1 DISORDER: ICD-10-CM

## 2022-09-22 NOTE — PROGRESS NOTES
Opted not to place order at present; pt will send in messag with new dosages as upon his d/c from IOP at  [facility] then plan to fill after he confirms dosing    D Post MD

## 2022-09-24 ENCOUNTER — TELEPHONE (OUTPATIENT)
Dept: PSYCHIATRY | Facility: CLINIC | Age: 22
End: 2022-09-24
Payer: OTHER GOVERNMENT

## 2022-09-24 DIAGNOSIS — F90.9 ATTENTION DEFICIT DISORDER WITH HYPERACTIVITY: Primary | ICD-10-CM

## 2022-09-24 DIAGNOSIS — F31.9 BIPOLAR 1 DISORDER: ICD-10-CM

## 2022-09-24 RX ORDER — LURASIDONE HYDROCHLORIDE 80 MG/1
80 TABLET, FILM COATED ORAL NIGHTLY
COMMUNITY
Start: 2022-09-22 | End: 2022-09-24

## 2022-09-24 RX ORDER — FLUOXETINE HYDROCHLORIDE 20 MG/1
60 CAPSULE ORAL DAILY
Qty: 90 CAPSULE | Refills: 0 | Status: SHIPPED | OUTPATIENT
Start: 2022-09-24 | End: 2022-10-05 | Stop reason: SDUPTHER

## 2022-09-24 RX ORDER — DEXTROAMPHETAMINE SACCHARATE, AMPHETAMINE ASPARTATE, DEXTROAMPHETAMINE SULFATE AND AMPHETAMINE SULFATE 2.5; 2.5; 2.5; 2.5 MG/1; MG/1; MG/1; MG/1
TABLET ORAL
Qty: 90 TABLET | Refills: 0 | OUTPATIENT
Start: 2022-09-24

## 2022-09-24 RX ORDER — LURASIDONE HYDROCHLORIDE 80 MG/1
80 TABLET, FILM COATED ORAL DAILY
Qty: 30 TABLET | Refills: 0 | Status: SHIPPED | OUTPATIENT
Start: 2022-09-24 | End: 2022-10-05 | Stop reason: SDUPTHER

## 2022-09-24 RX ORDER — DEXTROAMPHETAMINE SACCHARATE, AMPHETAMINE ASPARTATE MONOHYDRATE, DEXTROAMPHETAMINE SULFATE AND AMPHETAMINE SULFATE 5; 5; 5; 5 MG/1; MG/1; MG/1; MG/1
20 CAPSULE, EXTENDED RELEASE ORAL EVERY MORNING
Qty: 30 CAPSULE | Refills: 0 | Status: SHIPPED | OUTPATIENT
Start: 2022-09-24 | End: 2022-10-24

## 2022-09-24 RX ORDER — DEXTROAMPHETAMINE SACCHARATE, AMPHETAMINE ASPARTATE, DEXTROAMPHETAMINE SULFATE AND AMPHETAMINE SULFATE 2.5; 2.5; 2.5; 2.5 MG/1; MG/1; MG/1; MG/1
10 TABLET ORAL DAILY
Qty: 30 TABLET | Refills: 0 | Status: SHIPPED | OUTPATIENT
Start: 2022-09-24 | End: 2022-10-05 | Stop reason: SDUPTHER

## 2022-09-24 NOTE — TELEPHONE ENCOUNTER
Shantelle (and messaged ) mr Turner (Herminio):     Re: your behavioral health meds:     As discussed on phone (moments ago): I  am now sending 1 month of those meds via electronic prescription  to your [facility] pharmacy      Thank you for researching and sending that  information in     See you at appt at [facility]  / [facility]        9/29/2022 11:30 AM ESTABLISHED PATIENT [facility] Psych[facility] Dusty Lovell MD   Location Instructions:    3rd Floor      Sincerely,  D Post MD Sharmin Simmons LPN routed conversation to You 2 days ago     Herminio Pretty 2 days ago     I am taking Latuda 80mg, 3 prozac 20mg capsules, Adderall XR 20mg, and one Adderall 10mg.

## 2022-09-29 ENCOUNTER — PATIENT MESSAGE (OUTPATIENT)
Dept: PSYCHIATRY | Facility: CLINIC | Age: 22
End: 2022-09-29
Payer: OTHER GOVERNMENT

## 2022-10-05 ENCOUNTER — OFFICE VISIT (OUTPATIENT)
Dept: PSYCHIATRY | Facility: CLINIC | Age: 22
End: 2022-10-05
Payer: OTHER GOVERNMENT

## 2022-10-05 VITALS
SYSTOLIC BLOOD PRESSURE: 119 MMHG | BODY MASS INDEX: 41.21 KG/M2 | DIASTOLIC BLOOD PRESSURE: 87 MMHG | HEART RATE: 93 BPM | WEIGHT: 315 LBS

## 2022-10-05 DIAGNOSIS — F66 GENDER IDENTITY UNCERTAINTY: ICD-10-CM

## 2022-10-05 DIAGNOSIS — F90.9 ATTENTION DEFICIT DISORDER WITH HYPERACTIVITY: ICD-10-CM

## 2022-10-05 DIAGNOSIS — F31.62 BIPOLAR 1 DISORDER, MIXED, MODERATE: ICD-10-CM

## 2022-10-05 DIAGNOSIS — F84.0 AUTISM DISORDER: Primary | ICD-10-CM

## 2022-10-05 DIAGNOSIS — F41.9 ANXIETY DISORDER, UNSPECIFIED TYPE: ICD-10-CM

## 2022-10-05 PROCEDURE — 99215 OFFICE O/P EST HI 40 MIN: CPT | Mod: S$PBB,,, | Performed by: PSYCHIATRY & NEUROLOGY

## 2022-10-05 PROCEDURE — 99215 PR OFFICE/OUTPT VISIT, EST, LEVL V, 40-54 MIN: ICD-10-PCS | Mod: S$PBB,,, | Performed by: PSYCHIATRY & NEUROLOGY

## 2022-10-05 PROCEDURE — 99213 OFFICE O/P EST LOW 20 MIN: CPT | Mod: PBBFAC | Performed by: PSYCHIATRY & NEUROLOGY

## 2022-10-05 PROCEDURE — 99999 PR PBB SHADOW E&M-EST. PATIENT-LVL III: ICD-10-PCS | Mod: PBBFAC,,, | Performed by: PSYCHIATRY & NEUROLOGY

## 2022-10-05 PROCEDURE — 99999 PR PBB SHADOW E&M-EST. PATIENT-LVL III: CPT | Mod: PBBFAC,,, | Performed by: PSYCHIATRY & NEUROLOGY

## 2022-10-05 RX ORDER — DEXTROAMPHETAMINE SACCHARATE, AMPHETAMINE ASPARTATE MONOHYDRATE, DEXTROAMPHETAMINE SULFATE AND AMPHETAMINE SULFATE 5; 5; 5; 5 MG/1; MG/1; MG/1; MG/1
20 CAPSULE, EXTENDED RELEASE ORAL EVERY MORNING
Qty: 30 CAPSULE | Refills: 0 | Status: SHIPPED | OUTPATIENT
Start: 2022-11-23 | End: 2022-12-07 | Stop reason: SDUPTHER

## 2022-10-05 RX ORDER — DEXTROAMPHETAMINE SACCHARATE, AMPHETAMINE ASPARTATE, DEXTROAMPHETAMINE SULFATE AND AMPHETAMINE SULFATE 2.5; 2.5; 2.5; 2.5 MG/1; MG/1; MG/1; MG/1
10 TABLET ORAL DAILY
Qty: 30 TABLET | Refills: 0 | Status: SHIPPED | OUTPATIENT
Start: 2022-10-24 | End: 2022-11-23

## 2022-10-05 RX ORDER — LURASIDONE HYDROCHLORIDE 80 MG/1
80 TABLET, FILM COATED ORAL DAILY
Qty: 30 TABLET | Refills: 0 | Status: SHIPPED | OUTPATIENT
Start: 2022-10-05 | End: 2022-12-07 | Stop reason: SDUPTHER

## 2022-10-05 RX ORDER — DEXTROAMPHETAMINE SACCHARATE, AMPHETAMINE ASPARTATE MONOHYDRATE, DEXTROAMPHETAMINE SULFATE AND AMPHETAMINE SULFATE 5; 5; 5; 5 MG/1; MG/1; MG/1; MG/1
20 CAPSULE, EXTENDED RELEASE ORAL EVERY MORNING
Qty: 30 CAPSULE | Refills: 0 | Status: SHIPPED | OUTPATIENT
Start: 2022-10-24 | End: 2022-11-23

## 2022-10-05 RX ORDER — FLUOXETINE HYDROCHLORIDE 20 MG/1
60 CAPSULE ORAL DAILY
Qty: 90 CAPSULE | Refills: 0 | Status: SHIPPED | OUTPATIENT
Start: 2022-10-05 | End: 2022-12-07 | Stop reason: SDUPTHER

## 2022-10-05 RX ORDER — DEXTROAMPHETAMINE SACCHARATE, AMPHETAMINE ASPARTATE, DEXTROAMPHETAMINE SULFATE AND AMPHETAMINE SULFATE 2.5; 2.5; 2.5; 2.5 MG/1; MG/1; MG/1; MG/1
10 TABLET ORAL DAILY
Qty: 30 TABLET | Refills: 0 | Status: SHIPPED | OUTPATIENT
Start: 2022-11-23 | End: 2022-12-07 | Stop reason: SDUPTHER

## 2022-10-05 RX ORDER — ARIPIPRAZOLE 2 MG/1
2 TABLET ORAL DAILY
Qty: 30 TABLET | Refills: 2 | Status: SHIPPED | OUTPATIENT
Start: 2022-10-05 | End: 2022-12-07

## 2022-10-05 NOTE — PROGRESS NOTES
"  Herminio Turner   2000   10/05/2022     Disclaimer: Evaluation and treatment is based on information presented to date. Any new information may affect assessment and findings.     Location:  In Clinic     Who (in attendance) :    pt himself / ho he allowed call placed <he called on his cell speaker phone >to mom Erum)  for feedback     Time 55 min  (Oct 5-2022)    Finished 9- [facility] IOP    Says "helped him; learned coping skills"    Bipolar / Autism / ADHD / Anxiety case   dad pharma rep Tan [place] (who also officer  in [employer])     BACKGROUND INFO : See Psyc MD Eval or excerpt at end of this note    S: Patient's Own Perception of Condition (& Side Effects): no med complaint      O:      CURRENT PRESENTATION: NOTE AFTERCARE  [facility] Intensive Outpatient Program (IOP) where he went voluntarily    He says doing ok    Mom notes some mood elevation; said bit manic / says at one point got into dad liquor cabinet and drank some alcohol ;  also stayed up late  / little sleep ;' lso mom says was irritating to younger sister ; we processed     / note given such mood elevation / discussed add Abilify / mutual agree to such / will start 2 mg daily     Reminded Herminio (and mom) that IF symptoms were to persist can recontact [facility] Intensive Outpatient Program (IOP) for readmission or to contact this dept / ER of 1 IF acute concerns.    Mom assures dad (  [employer]) has AK 47 in gun cabinet. I emphasized safety 1st Herminio assures such.     Understanding Expressed    As well mom informs that Herminio has been seeing Carlos Alberto Johnson for Behavior Mod since Jan 2022    Leora Flowers, PhD   [facility], Psychologist [email or web address] ( [phone]    Returns from bit over 3 MONTHS at  Gen Intensive Outpatient Program (IOP)    [facility]  Intensive Outpatient Program (IOP)   Attending Psyc MD: Phan Naranjo MD    D/C " Instruction / med list   Admitted      06-  Discharged: 09-    [facility]  Intensive Outpatient Program (IOP) D/C Meds:   Latuda advanced to 80 mg  Adderall XR 20 mg (no longer mydais)   Adderall qo mg in afternoon  Prozac 20 PO q D    Not working at present / looking for job     In regards to his purchase of AK 47 / see last note June 2022 for more detail  Tonya remains with dad who is colonel in [employer]    I did tell him that I for some reason I am informed of that weapon leave from dad's gun case such may be viewed as non compliance withtreatment and may jeopardize my continued role in his treatment (as such would be not something therapeutically cata Understanding Expressed    He was calm polite     goal directed    No psychosis    No SI / no HI  Says doing fine  He starting [facility]  Intensive Outpatient Program (IOP)  Says attends TUES WED FRI 9:30a to Noon     Medication: had discussed move to Abilify  Tho ultimate decided to move to increase Latuda to 60 mg and move to adderall XR 20 mg from mydais     [facility] Gen program // he / dad will discuss at that venue as well.     Dad notes wt gain about 50 lb per dad while on current regimen / a regimen been on for while     Says remains [facility] Intensive Outpatient Program (IOP) / says not sure when will finish tho says helpign him ; counselor is Charu Carpenter mngt is at [facility] while enrolled there / today is status check / he is not at the program today     GAD7 10/5/2022 7/28/2022 1/24/2022   1. Feeling nervous, anxious, or on edge? 1 1 2   2. Not being able to stop or control worrying? 1 1 2   3. Worrying too much about different things? 1 1 2   4. Trouble relaxing? 1 2 1   5. Being so restless that it is hard to sit still? 1 1 3   6. Becoming easily annoyed or irritable? 1 0 1   7. Feeling afraid as if something awful might happen? 0 0 3   8. If you checked off any problems, how difficult have these problems made it for you to do your work,  take care of things at home, or get along with other people? 1 1 2   NAN-7 Score 6 6 14     Depression Patient Health Questionnaire 10/5/2022 7/28/2022 7/29/2021   Over the last two weeks how often have you been bothered by little interest or pleasure in doing things Several days Not at all Not at all   Over the last two weeks how often have you been bothered by feeling down, depressed or hopeless Not at all Several days Not at all   PHQ-2 Total Score 1 1 0   Over the last two weeks how often have you been bothered by trouble falling or staying asleep, or sleeping too much More than half the days More than half the days -   Over the last two weeks how often have you been bothered by feeling tired or having little energy Several days Several days -   Over the last two weeks how often have you been bothered by a poor appetite or overeating Several days Several days -   Over the last two weeks how often have you been bothered by feeling bad about yourself - or that you are a failure or have let yourself or your family down Several days More than half the days -   Over the last two weeks how often have you been bothered by trouble concentrating on things, such as reading the newspaper or watching television More than half the days Several days -   Over the last two weeks how often have you been bothered by moving or speaking so slowly that other people could have noticed. Or the opposite - being so fidgety or restless that you have been moving around a lot more than usual. More than half the days Not at all -   Over the last two weeks how often have you been bothered by thoughts that you would be better off dead, or of hurting yourself Not at all Not at all -   If you checked off any problems, how difficult have these problems made it for you to do your work, take care of things at home or get along with other people? Somewhat difficult Somewhat difficult -   Total Score 10 8 -   Interpretation Moderate Mild -        Constitutional Health Concerns:      Review of Systems   Constitutional:        Calvino overweight ; his efforts to lose weight paid off: lost about 6 lbs / now 311.5 lb   HENT: Negative.    Eyes: Negative.    Respiratory: Negative.    Cardiovascular: Negative.    Gastrointestinal: Negative.    Genitourinary: Negative.    Musculoskeletal: Negative.    Skin: Negative.    Neurological: Negative.    Endo/Heme/Allergies: Negative.        Vitals:    10/05/22 1423   BP: 119/87   Pulse: 93       Laboratory Data  No visits with results within 1 Month(s) from this visit.   Latest known visit with results is:   Lab Visit on 08/18/2022   Component Date Value Ref Range Status    WBC 08/18/2022 5.56  3.90 - 12.70 K/uL Final    RBC 08/18/2022 4.90  4.60 - 6.20 M/uL Final    Hemoglobin 08/18/2022 13.3 (L)  14.0 - 18.0 g/dL Final    Hematocrit 08/18/2022 41.3  40.0 - 54.0 % Final    MCV 08/18/2022 84  82 - 98 fL Final    MCH 08/18/2022 27.1  27.0 - 31.0 pg Final    MCHC 08/18/2022 32.2  32.0 - 36.0 g/dL Final    RDW 08/18/2022 12.2  11.5 - 14.5 % Final    Platelets 08/18/2022 290  150 - 450 K/uL Final    MPV 08/18/2022 10.5  9.2 - 12.9 fL Final    Immature Granulocytes 08/18/2022 0.4  0.0 - 0.5 % Final    Gran # (ANC) 08/18/2022 3.0  1.8 - 7.7 K/uL Final    Immature Grans (Abs) 08/18/2022 0.02  0.00 - 0.04 K/uL Final    Lymph # 08/18/2022 2.0  1.0 - 4.8 K/uL Final    Mono # 08/18/2022 0.5  0.3 - 1.0 K/uL Final    Eos # 08/18/2022 0.1  0.0 - 0.5 K/uL Final    Baso # 08/18/2022 0.02  0.00 - 0.20 K/uL Final    nRBC 08/18/2022 0  0 /100 WBC Final    Gran % 08/18/2022 53.5  38.0 - 73.0 % Final    Lymph % 08/18/2022 35.8  18.0 - 48.0 % Final    Mono % 08/18/2022 8.5  4.0 - 15.0 % Final    Eosinophil % 08/18/2022 1.4  0.0 - 8.0 % Final    Basophil % 08/18/2022 0.4  0.0 - 1.9 % Final    Differential Method 08/18/2022 Automated   Final    Protein, Serum 08/18/2022 6.1  6.0 - 8.4 g/dL Final    Albumin 08/18/2022 3.70  3.35 - 5.55 g/dL  "Final    Alpha-1 08/18/2022 0.32  0.17 - 0.41 g/dL Final    Alpha-2 08/18/2022 0.91  0.43 - 0.99 g/dL Final    Beta 08/18/2022 0.65  0.50 - 1.10 g/dL Final    Gamma 08/18/2022 0.51 (L)  0.67 - 1.58 g/dL Final    Sodium 08/18/2022 138  136 - 145 mmol/L Final    Potassium 08/18/2022 4.0  3.5 - 5.1 mmol/L Final    Chloride 08/18/2022 104  95 - 110 mmol/L Final    CO2 08/18/2022 24  23 - 29 mmol/L Final    Glucose 08/18/2022 90  70 - 110 mg/dL Final    BUN 08/18/2022 10  6 - 20 mg/dL Final    Creatinine 08/18/2022 0.7  0.5 - 1.4 mg/dL Final    Calcium 08/18/2022 9.3  8.7 - 10.5 mg/dL Final    Total Protein 08/18/2022 6.4  6.0 - 8.4 g/dL Final    Albumin 08/18/2022 3.7  3.5 - 5.2 g/dL Final    Total Bilirubin 08/18/2022 0.5  0.1 - 1.0 mg/dL Final    Alkaline Phosphatase 08/18/2022 92  55 - 135 U/L Final    AST 08/18/2022 32  10 - 40 U/L Final    ALT 08/18/2022 50 (H)  10 - 44 U/L Final    Anion Gap 08/18/2022 10  8 - 16 mmol/L Final    eGFR 08/18/2022 >60.0  >60 mL/min/1.73 m^2 Final    Pathologist Interpretation SPE 08/18/2022 REVIEWED   Final        Mental Status Exam:   Appearance: casual / overweight   Oriented: x 3 /   Attitude: cooperative polite  Eye Contact: good   Behavior: calm here   Mood: says Ok   Cognition: alert  Concentration: grossly intact tho reports at times struggles / is on ADHD meds  Affect: appropriate range   Anxiety: moderate  Thought Process: goal directed   Speech: Volume : WNL   Quantity WNL   Quality: appears to openly answer questions ; mild/ mod lisp   Eye Contact: good   Threats: no SI / no HI ; never any act self harm  Psychosis: denies all   Estimate of Intellectual Function: low average   Psychosis: denies all      Musculoskeletal:  No tremor     Social: lives with parent  / likes biology / science     Patient Active Problem List   Diagnosis    ADD (attention deficit disorder with hyperactivity)    Anxiety disorder    Right foot pain    Adult BMI 38.0-38.9 kg/sq m (6'3" and 310 lb) "    Bipolar 1 disorder (Jan 25 2022: referred in on Latuda 40 mg)    Gender identity issues; in adol identified some  as female tho parents in record report that actauly no attraction to either male or female    Autism disorder ( formerly Asperger)    Bipolar 1 disorder, mixed, moderate    BMI 40.0-44.9, adult          Current Outpatient Medications:     ARIPiprazole (ABILIFY) 2 MG Tab, Take 1 tablet (2 mg total) by mouth once daily., Disp: 30 tablet, Rfl: 2    dextroamphetamine-amphetamine (ADDERALL XR) 20 MG 24 hr capsule, Take 1 capsule (20 mg total) by mouth every morning., Disp: 30 capsule, Rfl: 0    [START ON 10/24/2022] dextroamphetamine-amphetamine (ADDERALL XR) 20 MG 24 hr capsule, Take 1 capsule (20 mg total) by mouth every morning., Disp: 30 capsule, Rfl: 0    [START ON 11/23/2022] dextroamphetamine-amphetamine (ADDERALL XR) 20 MG 24 hr capsule, Take 1 capsule (20 mg total) by mouth every morning., Disp: 30 capsule, Rfl: 0    [START ON 10/24/2022] dextroamphetamine-amphetamine 10 mg Tab, Take 1 tablet (10 mg total) by mouth Daily. In afternoon approx 6 hours after morning dose of Adderall XR supply, Disp: 30 tablet, Rfl: 0    [START ON 11/23/2022] dextroamphetamine-amphetamine 10 mg Tab, Take 1 tablet (10 mg total) by mouth Daily. In afternoon approx 6 hours after morning dose of Adderall XR supply, Disp: 30 tablet, Rfl: 0    FLUoxetine 20 MG capsule, Take 3 capsules (60 mg total) by mouth once daily., Disp: 90 capsule, Rfl: 0    ketoconazole (NIZORAL) 2 % shampoo, Apply topically 3 (three) times a week., Disp: 120 mL, Rfl: 2    lurasidone (LATUDA) 80 mg Tab tablet, Take 1 tablet (80 mg total) by mouth once daily., Disp: 30 tablet, Rfl: 0    ofloxacin (FLOXIN) 0.3 % otic solution, Place 10 drops into both ears once daily., Disp: 10 mL, Rfl: 0    sars-cov-2, covid-19, (PFIZER COVID-19) 30 mcg/0.3 ml injection, Inject into the muscle. (Patient not taking: No sig reported), Disp: 0.3 mL, Rfl: 0     Social  "History     Tobacco Use   Smoking Status Never   Smokeless Tobacco Never        Review of patient's allergies indicates:  No Known Allergies     ASSESSMENT:   Encounter Diagnoses   Name Primary?    Autism disorder ( formerly Asperger) Yes    Bipolar 1 disorder, mixed, moderate     Attention deficit disorder with hyperactivity     Anxiety disorder, unspecified type     Gender Identity Issue: Oct 2022: pt says "still figuring it all out" ; saying "not feel one or the other" / tho parents report actually no attraction to either male or female     BMI 40.0-44.9, adult (Oct 2022: 6' 3"and 329 lb)        Patient Instructions     PLAN:     FOLLOW UP Dec 7 2022 1 pm  In Clinic    Gave Herminio my [facility] contact  card to give to his behavior counselor Cheyenne Flowers PhD / re collab of care     Meds:  see after visit summary  / As discussed with him and with mom (Laurie [place]) on pt's speaker phone / as she noted some mood elevation : mutual agree with Herminio (and mom) to add Abilify  / start 2 mg ; other meds renew as prior risk benefit reviewed     References:     Relaxation stress reduction workbook: GODFREY Garza PhD ( used: $7-10)    Feeling Good Website: Dusty Krishnamurthy MD / [email or web address] website (free) / varsha. PODCASTS    Anxiety &  phobia workbook by SHANTI Mckeon PhD  (web retailers: used: $ 7-10)    [place]: Path to Better Sleep : https://www.veterantraining.va.gov/insomnia/ (free)       Pt expressed appreciation for the visit today and did not have further question at this time though pt  was still informed to:     Call  if problems.    Call / Report Side Effects to Psyc MD     Encouraged to follow up with primary care / Gen Med MD for continued monitoring of general health and wellness.    Understanding was expressed; and no further concerns nor questions were raised at this time.     remember healthy self care:   eat right  attempt adequate rest   HANDWASHING / encourage such varsha. During this corona virus time   walk " "or light exercise within reason and as your general med team approves  read or explore any of reference materials / homework mentioned  reach out (I.e.,  connect with)  others who nuture and bring out best in you  avoid risky behaviors  keep your appointments  IF you  cannot make your appt THEN please call or go online to reschedule.  avoid  alcohol and illicit substances.  Look for the positive.  All is often relative-seek balance  Call sooner if needed : [phone]   Call 911 or go to Emergency Room  (ER)  if any acute concerns    >> BACKGROUND INFO : From Admit Psyc MD Ortega  <<    Herminio [place] a 21 y.o. ADOPTED (at birth)  male  Who is: 6' 3"  and 310 lbs.  Adoption was at birth; says that it is a CLOSED adoption. Says he has been told bio mom had mental health and substance issues.     Mr [place] (Herminio)  presents today as referred by Giancarlo Mesa MD  [facility] PCP.      Pt tells me that  I also know his dad:  Mr Tan [place] (a very nice capable pharmaceutical  representative  who also happens to be Colonel in [employer]. His mom is Concepcion [place]; she was from [place]     He had been followed by   [facility]     Referred in with diagnostics of  Autism, ADHD, Bipolar and Major Depression as well as reference to pt having Gender Identity Issues 'not knowing preference as to male or female.  Denies Substance use.     Says intent is to get all (or as many providers in [facility]) as he able.     Says he likes his meds     Prozac 20 mg x3 a day (60 mg total daily)  Latuda 40 mg  referred in on MYDAYIS (dextroamphtamine-amphetamine CT 24) 37.5 mg  And ALSO TAKES dextroamphtamine-amphetamine 10 mg (TWO) tabs =20 mg) IN ADDITION to MYDAYIS in morning; says no longer takes an additional tab later in day     D Post MD did check [place] [facility]     Mr [place] (Herminio)  Brings in several prior treatment records from:     [facility] [id number] [facility] [phone] (ph and fax) : [email or web address] Developmental report done by Leti Lawson (Pat) Psychologist #[id number] ; report of  5-13[birth year]17 when he was 16 yrs old ; in 8th grade; Herminio was said brought in for re exam of ADHD which report references was previously varsha at age 8;      SEE  TESTING Report SUBMITTED FOR SCANNING: includes in part:      Wechsler IQ; parent teacher  Rating;   ADHD Eval (Lei CPT III with teacher parent and self form  Autism testing: [place] Asperger's Disorder scale; Social Responsiveness scale  Autism Diagnostic Observation System (ADOS) and   Multidimentional Anxiety Scale for kids  MASC-2)     Some comments from Dr Bria Timmons report:   -Did not speak until 3.5 yrs old  -Diagnosed ADHD at age 8 yrs old  -Rx glasses age 9 yrs   -Phys therapy for hypotonia (at 16 yrs)  -At age 15y began having thoughts he was a female; participated in therapy with Aziza Linares LCSW to address sexual identity ; there was discussion of taking medication to block Catholic of puberty; therapist is said to have had some expertise / interest in transgender issues.'     SEE FULL REPORT (Dr Miles AS SUBMITTING FOR SCANNING ITNO EPIC   Parents reported that Herminio really has 'NO SEXUAL IDENTITY; professing no interest in boys or girls.     Herminio was reported as doing well in school; mother studies with him nightly ; he is said not to have studied on own; 'trouble understanding needs of others around him; said to be sweet and loving child; socially aggravates other students struggling with social skills; not asking teachers or other students for help; fixates on things like building own computer likes video usama and would stay on computer al anil iof allowed; little interest in socializing with otehrs around him angelica enjoys usama with people all around the world; he is said to be very literal; quirky sense humor other not understand.  Mom is stay at home dad is  "\  Amy has some speech articulation issues     On Weschler Full Scale IQ  83 (true range 79-89 placing at 13th %ile; low avg range  On another measure the General Ability Index (GAI) Herminio did significantly better and mention made of cautiously interpreting FSIQ.  On GAI he scored 95 with confidence interval of 90-101and "in average range."     WISC V scores:  Verbal Comprehension VCI 89 23%;   Visual Spatial Index VSI 92 30%ile  Fluid Reasoning Index  73%  Working memory Index WMI 79 8 %  Processing Speed Index PSI  72 3%      ADHD Assesment: Lei 3 : "eelvated T scores for ALL scales except Defiant. " Lei Probablility Score = scored 99%; indicating individuals with ADHD obtained  This probability score 99% of time.       Adaptive function Sore ABAS II composite score of 64; such reflects Herminio score as as good or btter than 1% of children his same age / extremely low.     Giliam Asperger Disorder Scale GADS   Social Interaction score 9 (37%ile)  Restricted Pattern behavior score 14 (91% ile)  Cognitive Pattern score 14 (91 %ile)  Pragmatic Skills score 11 (63%ile)  Asperger quotient score  113 (81 %ile)      MMPI high on depression general apathy ; lack confidence; social isolation; other trends reflect anxiety self preoccupation and rigidity and may show overreaction to life events      Also  Porsche WHITLOCK [email or web address] [email or web address] ; ph:[phone] ; fax: [phone] ; says does not see her any longer ; says saw her in 10th / 11th grade    He  Said came over here to [facility] to [facility] care providers.      See packet of materials (SCANNED to Epic  ) as noted above which do support diagnosis of ADHD and autism.     His self rating scales are also recorded below.     He did endorse 9 of 13 items on Mood Disorder Questionnaire (MDQ)   Prior MH Treatment    Inpatient  None  Outpatient: In past (10th / 11th grade) Porsche MAIN LCSW " "FARIBA [email or web address] ; ph:[phone] ; fax: [phone] and then was seeing [facility]    Physical Abuse: N  Sexual abuse  N     Psychosis: N     Substance Use:     Alcohol: says no control; tho has tried; has enjoyed tho notes was not much; maybe had 1 beer a week tho parents locked it up as a precaution (birth mom had issues alcohol and said to have used 'other drugs' and bipolar so parents being cautious)     3 wishes? : money, car / not think of a 3rd desire     PSYCHO-SOCIAL DEVELOPMENT HISTORY:      City Born: [facility]     Siblings:  Sisters: he has one adopted sister (not bio related) ; sister was adopted  from [place] when sister was  33 months      Parents : alive      Briefly Describe  your Mom: kind ; sometimes speaks before thinking  Briefly Describe your Dad: very hard working  and kind     Bio Mom: Occupation: homemaker  Bio Dad:  Occupation: pharma rep and in [employer]      Marital Status:  Single     Sexual orientation: says no strong "pull attraction to either sex" tho could find either attractive so he says that is why he says  bisexual tho says he has not had long term relationship      Education: grad [employer] ([employer] regular classes) / [employer] it ;  Went [employer] Aug to Dec 2021 (on campus)     Jainism / Spiritual: n     Legal Issues? n  DWI ?n  M[facility] time?n     Employment:  at [employer] says looking  for another job     On Disability? n        "

## 2022-10-05 NOTE — PATIENT INSTRUCTIONS
PLAN:     FOLLOW UP Dec 7 2022 1 pm  In Clinic    Gave Herminio my [facility] contact  card to give to his behavior counselor Cheyenne Flowers PhD / re collab of care     Meds:  see after visit summary  / As discussed with him and with mom (Laurie [place]) on pt's speaker phone / as she noted some mood elevation : mutual agree with Herminio (and mom) to add Abilify  / start 2 mg ; other meds renew as prior risk benefit reviewed     References:     Relaxation stress reduction workbook: GODFREY Garza PhD ( used: $7-10)    Feeling Good Website: Dusty Krishnamurthy MD / [email or web address] website (free) / varsha. PODCASTS    Anxiety &  phobia workbook by SHANTI Mckeon PhD  (web retailers: used: $ 7-10)    [place]: Path to Better Sleep : https://www.veterantraining.[place].gov/insomnia/ (free)       Pt expressed appreciation for the visit today and did not have further question at this time though pt  was still informed to:     Call  if problems.    Call / Report Side Effects to Psyc MD     Encouraged to follow up with primary care / Gen Med MD for continued monitoring of general health and wellness.    Understanding was expressed; and no further concerns nor questions were raised at this time.     remember healthy self care:   eat right  attempt adequate rest   HANDWASHING / encourage such varsha. During this corona virus time   walk or light exercise within reason and as your general med team approves  read or explore any of reference materials / homework mentioned  reach out (I.e.,  connect with)  others who nuture and bring out best in you  avoid risky behaviors  keep your appointments  IF you  cannot make your appt THEN please call or go online to reschedule.  avoid  alcohol and illicit substances.  Look for the positive.  All is often relative-seek balance  Call sooner if needed : [phone]   Call 911 or go to Emergency Room  (ER)  if any acute concerns

## 2022-10-20 ENCOUNTER — PATIENT MESSAGE (OUTPATIENT)
Dept: ADMINISTRATIVE | Facility: HOSPITAL | Age: 22
End: 2022-10-20
Payer: OTHER GOVERNMENT

## 2022-11-15 ENCOUNTER — OFFICE VISIT (OUTPATIENT)
Dept: OTOLARYNGOLOGY | Facility: CLINIC | Age: 22
End: 2022-11-15
Payer: OTHER GOVERNMENT

## 2022-11-15 DIAGNOSIS — H92.12 OTORRHEA, LEFT: Primary | ICD-10-CM

## 2022-11-15 DIAGNOSIS — S09.302A EARDRUM TRAUMA, LEFT, INITIAL ENCOUNTER: ICD-10-CM

## 2022-11-15 PROCEDURE — 99213 OFFICE O/P EST LOW 20 MIN: CPT | Mod: S$PBB,,, | Performed by: ORTHOPAEDIC SURGERY

## 2022-11-15 PROCEDURE — 99212 OFFICE O/P EST SF 10 MIN: CPT | Mod: PBBFAC | Performed by: ORTHOPAEDIC SURGERY

## 2022-11-15 PROCEDURE — 99213 PR OFFICE/OUTPT VISIT, EST, LEVL III, 20-29 MIN: ICD-10-PCS | Mod: S$PBB,,, | Performed by: ORTHOPAEDIC SURGERY

## 2022-11-15 PROCEDURE — 99999 PR PBB SHADOW E&M-EST. PATIENT-LVL II: CPT | Mod: PBBFAC,,, | Performed by: ORTHOPAEDIC SURGERY

## 2022-11-15 PROCEDURE — 99999 PR PBB SHADOW E&M-EST. PATIENT-LVL II: ICD-10-PCS | Mod: PBBFAC,,, | Performed by: ORTHOPAEDIC SURGERY

## 2022-11-15 RX ORDER — CIPROFLOXACIN AND DEXAMETHASONE 3; 1 MG/ML; MG/ML
4 SUSPENSION/ DROPS AURICULAR (OTIC) 2 TIMES DAILY
Qty: 7.5 ML | Refills: 0 | Status: SHIPPED | OUTPATIENT
Start: 2022-11-15 | End: 2022-11-25
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
## 2022-12-04 ENCOUNTER — PATIENT MESSAGE (OUTPATIENT)
Dept: PSYCHIATRY | Facility: CLINIC | Age: 22
End: 2022-12-04
Payer: OTHER GOVERNMENT

## 2022-12-07 ENCOUNTER — TELEPHONE (OUTPATIENT)
Dept: PSYCHIATRY | Facility: CLINIC | Age: 22
End: 2022-12-07

## 2022-12-07 ENCOUNTER — OFFICE VISIT (OUTPATIENT)
Dept: PSYCHIATRY | Facility: CLINIC | Age: 22
End: 2022-12-07
Payer: OTHER GOVERNMENT

## 2022-12-07 VITALS
SYSTOLIC BLOOD PRESSURE: 120 MMHG | HEART RATE: 87 BPM | BODY MASS INDEX: 41.37 KG/M2 | DIASTOLIC BLOOD PRESSURE: 81 MMHG | WEIGHT: 315 LBS

## 2022-12-07 DIAGNOSIS — F90.9 ATTENTION DEFICIT DISORDER WITH HYPERACTIVITY: ICD-10-CM

## 2022-12-07 DIAGNOSIS — F41.9 ANXIETY DISORDER, UNSPECIFIED TYPE: ICD-10-CM

## 2022-12-07 DIAGNOSIS — F66 GENDER IDENTITY UNCERTAINTY: ICD-10-CM

## 2022-12-07 DIAGNOSIS — F84.0 AUTISM DISORDER: ICD-10-CM

## 2022-12-07 DIAGNOSIS — F31.62 BIPOLAR 1 DISORDER, MIXED, MODERATE: Primary | ICD-10-CM

## 2022-12-07 PROCEDURE — 99215 PR OFFICE/OUTPT VISIT, EST, LEVL V, 40-54 MIN: ICD-10-PCS | Mod: S$PBB,,, | Performed by: PSYCHIATRY & NEUROLOGY

## 2022-12-07 PROCEDURE — 99215 OFFICE O/P EST HI 40 MIN: CPT | Mod: S$PBB,,, | Performed by: PSYCHIATRY & NEUROLOGY

## 2022-12-07 PROCEDURE — 99212 OFFICE O/P EST SF 10 MIN: CPT | Mod: PBBFAC | Performed by: PSYCHIATRY & NEUROLOGY

## 2022-12-07 PROCEDURE — 99999 PR PBB SHADOW E&M-EST. PATIENT-LVL II: ICD-10-PCS | Mod: PBBFAC,,, | Performed by: PSYCHIATRY & NEUROLOGY

## 2022-12-07 PROCEDURE — 99999 PR PBB SHADOW E&M-EST. PATIENT-LVL II: CPT | Mod: PBBFAC,,, | Performed by: PSYCHIATRY & NEUROLOGY

## 2022-12-07 RX ORDER — LURASIDONE HYDROCHLORIDE 80 MG/1
80 TABLET, FILM COATED ORAL DAILY
Qty: 90 TABLET | Refills: 0 | Status: SHIPPED | OUTPATIENT
Start: 2022-12-07 | End: 2023-01-19 | Stop reason: SDUPTHER

## 2022-12-07 RX ORDER — DEXTROAMPHETAMINE SACCHARATE, AMPHETAMINE ASPARTATE, DEXTROAMPHETAMINE SULFATE AND AMPHETAMINE SULFATE 2.5; 2.5; 2.5; 2.5 MG/1; MG/1; MG/1; MG/1
10 TABLET ORAL DAILY
Qty: 30 TABLET | Refills: 0 | Status: SHIPPED | OUTPATIENT
Start: 2023-01-04 | End: 2023-01-19

## 2022-12-07 RX ORDER — ASENAPINE MALEATE 2.5 MG/1
5 TABLET SUBLINGUAL NIGHTLY
Qty: 30 TABLET | Refills: 1 | Status: SHIPPED | OUTPATIENT
Start: 2022-12-07 | End: 2022-12-07

## 2022-12-07 RX ORDER — DEXTROAMPHETAMINE SACCHARATE, AMPHETAMINE ASPARTATE, DEXTROAMPHETAMINE SULFATE AND AMPHETAMINE SULFATE 2.5; 2.5; 2.5; 2.5 MG/1; MG/1; MG/1; MG/1
10 TABLET ORAL DAILY
Qty: 30 TABLET | Refills: 0 | Status: SHIPPED | OUTPATIENT
Start: 2023-02-03 | End: 2023-01-19

## 2022-12-07 RX ORDER — DEXTROAMPHETAMINE SACCHARATE, AMPHETAMINE ASPARTATE, DEXTROAMPHETAMINE SULFATE AND AMPHETAMINE SULFATE 2.5; 2.5; 2.5; 2.5 MG/1; MG/1; MG/1; MG/1
10 TABLET ORAL DAILY
Qty: 30 TABLET | Refills: 0 | Status: SHIPPED | OUTPATIENT
Start: 2023-03-03 | End: 2023-01-19

## 2022-12-07 RX ORDER — DEXTROAMPHETAMINE SACCHARATE, AMPHETAMINE ASPARTATE MONOHYDRATE, DEXTROAMPHETAMINE SULFATE AND AMPHETAMINE SULFATE 5; 5; 5; 5 MG/1; MG/1; MG/1; MG/1
20 CAPSULE, EXTENDED RELEASE ORAL EVERY MORNING
Qty: 30 CAPSULE | Refills: 0 | Status: SHIPPED | OUTPATIENT
Start: 2023-03-03 | End: 2023-01-19 | Stop reason: SDUPTHER

## 2022-12-07 RX ORDER — DEXTROAMPHETAMINE SACCHARATE, AMPHETAMINE ASPARTATE MONOHYDRATE, DEXTROAMPHETAMINE SULFATE AND AMPHETAMINE SULFATE 5; 5; 5; 5 MG/1; MG/1; MG/1; MG/1
20 CAPSULE, EXTENDED RELEASE ORAL EVERY MORNING
Qty: 30 CAPSULE | Refills: 0 | Status: SHIPPED | OUTPATIENT
Start: 2023-01-04 | End: 2023-01-19

## 2022-12-07 RX ORDER — ASENAPINE 5 MG/1
5 TABLET SUBLINGUAL NIGHTLY
Qty: 30 TABLET | Refills: 2 | Status: SHIPPED | OUTPATIENT
Start: 2022-12-07 | End: 2023-01-19 | Stop reason: SDUPTHER

## 2022-12-07 RX ORDER — FLUOXETINE HYDROCHLORIDE 20 MG/1
60 CAPSULE ORAL DAILY
Qty: 270 CAPSULE | Refills: 0 | Status: SHIPPED | OUTPATIENT
Start: 2022-12-07 | End: 2023-01-19 | Stop reason: SDUPTHER

## 2022-12-07 RX ORDER — DEXTROAMPHETAMINE SACCHARATE, AMPHETAMINE ASPARTATE MONOHYDRATE, DEXTROAMPHETAMINE SULFATE AND AMPHETAMINE SULFATE 5; 5; 5; 5 MG/1; MG/1; MG/1; MG/1
20 CAPSULE, EXTENDED RELEASE ORAL EVERY MORNING
Qty: 30 CAPSULE | Refills: 0 | Status: SHIPPED | OUTPATIENT
Start: 2023-02-03 | End: 2023-01-19

## 2022-12-07 NOTE — PATIENT INSTRUCTIONS
PLAN:     Follow up  D Post MD Jan 19 2023 8:30a IN CLINIC    Gave Herminio my [facility] contact  card to give to his behavior counselor Cheyenne Flowers PhD / re collab of care     Meds:  in light some impulsive spending +/- some wt gain, D/C Abilify (adding as intolerant); will replace with Saphris 5 mg once at bed / remain on Latuda 80 mg     Other psyc meds remain the same>  prozac  60mg a day / adderall XR 20 mg and adderall immed release 10 mg 6 hrs after the Adderall  XR doing    References:     Relaxation stress reduction workbook: GODFREY Garza PhD ( used: $7-10)    Feeling Good Website: Dusty Krishnamurthy MD / [email or web address] website (free) / varsha. [employer]    Anxiety &  phobia workbook by SHANTI Mckeon PhD  (web retailers: used: $ 7-10)    [place]: Path to Better Sleep : https://www.[employer].[place].gov[place]insomnia/ (free)       Pt expressed appreciation for the visit today and did not have further question at this time though pt  was still informed to:     Call  if problems.    Call / Report Side Effects to Psyc MD     Encouraged to follow up with primary care / Gen Med MD for continued monitoring of general health and wellness.    Understanding was expressed; and no further concerns nor questions were raised at this time.     remember healthy self care:   eat right  attempt adequate rest   HANDWASHING / encourage such varsha. During this corona virus time   walk or light exercise within reason and as your general med team approves  read or explore any of reference materials / homework mentioned  reach out (I.e.,  connect with)  others who nuture and bring out best in you  avoid risky behaviors  keep your appointments  IF you  cannot make your appt THEN please call or go online to reschedule.  avoid  alcohol and illicit substances.  Look for the positive.  All is often relative-seek balance  Call sooner if needed : [phone]   Call 911 or go to Emergency Room  (ER)  if any acute concerns

## 2022-12-07 NOTE — PROGRESS NOTES
"    Herminio Krishnamurthy   2000 12/07/2022     Disclaimer: Evaluation and treatment is based on information presented to date. Any new information may affect assessment and findings.     Location:  In Clinic     Who (in attendance) :    pt himself  reo did say that I could contact mom or dad for update    Time 50 min  (Dec 7 2022)    Finished 9- [facility] IOP    Says "helped him; learned coping skills"    Bipolar / Autism / ADHD / Anxiety case   dad pharma rep Tan Krishnamurthy (who also officer  in [employer])     BACKGROUND INFO : See Psmark BARBER Eval or excerpt at end of this note    S: Patient's Own Perception of Condition (& Side Effects): no med complaint / t  O:      CURRENT PRESENTATION:     12-7-22 Says everything going ok' tho goes on ot say that he has had some impulsive spending / (in past bought AK 47 via mail order via [employer]); in this instance says he bought about $250 old X box game HOWEVER, at end of session Pearl BARBER did have opportunity to call  listed contact (father : Tan krishnamurthy)  to get update and dad said "Herminio is MANIPULATIVE AND ACTUALLY SENT OVER $9,000 IN LAST 4 WEEKS; and did so via opening new lines of credit WITHOUT parents knowledge.     Recently (Dec 2022) :  just got job [employer] [place] / in orientation     Calm alert   Polite  No SI no HI  No distress     Oct 2022" Mom notes some mood elevation; said bit manic / says at one point got into dad liquor cabinet and drank some alcohol ;  also stayed up late  / little sleep ;' lso mom says was irritating to younger sister ; we processed     Meds:    Oct 2022: given such mood elevation / discussed add Abilify / mutually agreed to add  Abilify 2 mg daily     Dec 2022: in light some impulsive spending +/- some wt gain, D/C Abilify (adding as intolerant); will replace with Saphris 5 mg once at bed (if cost issue: alternatives may be Vraylar  zyprexa or seroquel /     Pt will also remain (at least initi    Dec 7 2022: Herminio re-assures " AK 47 gun remains  in dad locked gun's cabinet    In regards to his purchase / see last note June 2022 for more detail    I did previously tell him that I for some reason I am informed of that weapon leave from dad's gun case such may be viewed as non compliance with treatment and may jeopardize my continued role in his treatment (as such would be not something therapeutically indicated) Understanding Expressed    As well mom informs that Herminio has been seeing Carlos Alberto Johnson for Behavior Mod since Jan 2022 : Leora Flowers, PhD   [facility], Psychologist [email or web address] [phone]    [facility] earleir in 2022: @ [facility]  Intensive Outpatient Program (IOP)   Attending Psyc MD: Phan Naranjo MD    D/C Instruction / med list   Admitted      06-  Discharged: 09-     Gen  Intensive Outpatient Program (IOP) D/C Meds:   Latuda advanced to 80 mg  Adderall XR 20 mg (no longer mydais)   Adderall qo mg in afternoon  Prozac 60 PO q D (via 20 mg x 3 capsules)    Constitutional Health Concerns:      Review of Systems   Constitutional: wt 331 (Dec 7-2022)  HENT: Negative.    Eyes: Negative.    Respiratory: Negative.    Cardiovascular: Negative.    Gastrointestinal: Negative.    Genitourinary: Negative.    Musculoskeletal: Negative.    Skin: Negative.    Neurological: Negative.    Endo/Heme/Allergies: Negative.      I did note that pt has new pt appt with [facility] :    1/20/2023 10:20 AM NEW PATIENT - VIRTUAL (DT) [place] [facility] Fatmata Parada MD   Historically has told me bisexual     Wt Readings from Last 24 Encounters:   12/07/22 (!) 150.1 kg (331 lb)   10/05/22 (!) 149.6 kg (329 lb 11.2 oz)   07/28/22 (!) 146.3 kg (322 lb 8 oz)   06/07/22 (!) 144.8 kg (319 lb 3.6 oz)   05/08/22 (!) 142.9 kg (315 lb)   04/13/22 (!) 143.9 kg (317 lb 3.9 oz)   03/21/22 (!) 141.3 kg (311 lb 8 oz)   02/02/22  (!) 144 kg (317 lb 7.4 oz)   01/24/22 (!) 140.8 kg (310 lb 8 oz)   12/08/21 (!) 144.7 kg (319 lb 0.1 oz)   11/17/21 (!) 145.4 kg (320 lb 8.8 oz)   10/20/21 (!) 147.1 kg (324 lb 4.8 oz)   10/18/21 (!) 147.1 kg (324 lb 4.8 oz)   08/16/21 (!) 149.6 kg (329 lb 12.9 oz)   07/29/21 (!) 149.6 kg (329 lb 12.9 oz)   06/09/21 (!) 150.4 kg (331 lb 9.2 oz)   04/29/21 (!) 146.1 kg (322 lb 1.5 oz)   03/11/21 (!) 144.6 kg (318 lb 12.6 oz)   08/27/20 133 kg (293 lb 3.4 oz)   07/11/19 126.3 kg (278 lb 7.1 oz)   11/13/18 115.4 kg (254 lb 6.6 oz)   09/26/17 86.2 kg (190 lb 0.6 oz)   08/22/17 85 kg (187 lb 6.3 oz)   05/20/15 80.4 kg (177 lb 4 oz)       Vitals:    12/07/22 0848   BP: 120/81   Pulse: 87       Laboratory Data  No visits with results within 1 Month(s) from this visit.   Latest known visit with results is:   Lab Visit on 08/18/2022   Component Date Value Ref Range Status    WBC 08/18/2022 5.56  3.90 - 12.70 K/uL Final    RBC 08/18/2022 4.90  4.60 - 6.20 M/uL Final    Hemoglobin 08/18/2022 13.3 (L)  14.0 - 18.0 g/dL Final    Hematocrit 08/18/2022 41.3  40.0 - 54.0 % Final    MCV 08/18/2022 84  82 - 98 fL Final    MCH 08/18/2022 27.1  27.0 - 31.0 pg Final    MCHC 08/18/2022 32.2  32.0 - 36.0 g/dL Final    RDW 08/18/2022 12.2  11.5 - 14.5 % Final    Platelets 08/18/2022 290  150 - 450 K/uL Final    MPV 08/18/2022 10.5  9.2 - 12.9 fL Final    Immature Granulocytes 08/18/2022 0.4  0.0 - 0.5 % Final    Gran # (ANC) 08/18/2022 3.0  1.8 - 7.7 K/uL Final    Immature Grans (Abs) 08/18/2022 0.02  0.00 - 0.04 K/uL Final    Lymph # 08/18/2022 2.0  1.0 - 4.8 K/uL Final    Mono # 08/18/2022 0.5  0.3 - 1.0 K/uL Final    Eos # 08/18/2022 0.1  0.0 - 0.5 K/uL Final    Baso # 08/18/2022 0.02  0.00 - 0.20 K/uL Final    nRBC 08/18/2022 0  0 /100 WBC Final    Gran % 08/18/2022 53.5  38.0 - 73.0 % Final    Lymph % 08/18/2022 35.8  18.0 - 48.0 % Final    Mono % 08/18/2022 8.5  4.0 - 15.0 % Final    Eosinophil % 08/18/2022 1.4  0.0 - 8.0 % Final     Basophil % 08/18/2022 0.4  0.0 - 1.9 % Final    Differential Method 08/18/2022 Automated   Final    Protein, Serum 08/18/2022 6.1  6.0 - 8.4 g/dL Final    Albumin 08/18/2022 3.70  3.35 - 5.55 g/dL Final    Alpha-1 08/18/2022 0.32  0.17 - 0.41 g/dL Final    Alpha-2 08/18/2022 0.91  0.43 - 0.99 g/dL Final    Beta 08/18/2022 0.65  0.50 - 1.10 g/dL Final    Gamma 08/18/2022 0.51 (L)  0.67 - 1.58 g/dL Final    Sodium 08/18/2022 138  136 - 145 mmol/L Final    Potassium 08/18/2022 4.0  3.5 - 5.1 mmol/L Final    Chloride 08/18/2022 104  95 - 110 mmol/L Final    CO2 08/18/2022 24  23 - 29 mmol/L Final    Glucose 08/18/2022 90  70 - 110 mg/dL Final    BUN 08/18/2022 10  6 - 20 mg/dL Final    Creatinine 08/18/2022 0.7  0.5 - 1.4 mg/dL Final    Calcium 08/18/2022 9.3  8.7 - 10.5 mg/dL Final    Total Protein 08/18/2022 6.4  6.0 - 8.4 g/dL Final    Albumin 08/18/2022 3.7  3.5 - 5.2 g/dL Final    Total Bilirubin 08/18/2022 0.5  0.1 - 1.0 mg/dL Final    Alkaline Phosphatase 08/18/2022 92  55 - 135 U/L Final    AST 08/18/2022 32  10 - 40 U/L Final    ALT 08/18/2022 50 (H)  10 - 44 U/L Final    Anion Gap 08/18/2022 10  8 - 16 mmol/L Final    eGFR 08/18/2022 >60.0  >60 mL/min/1.73 m^2 Final    Pathologist Interpretation SPE 08/18/2022 REVIEWED   Final        Mental Status Exam:   Appearance: casual / overweight   Oriented: x 3 /   Attitude: cooperative polite  Eye Contact: good   Behavior: calm here / does say recent impulsive buy X Box (Dec 2022)  Mood: says Ok   Cognition: alert  Concentration: grossly intact tho reports at times struggles / is on ADHD meds  Affect: appropriate range   Anxiety: moderate  Thought Process: goal directed   Speech: Volume : WNL   Quantity WNL   Quality: appears to openly answer questions ; mild/ mod lisp   Eye Contact: good   Threats: no SI / no HI ; never any act self harm  Psychosis: denies all   Estimate of Intellectual Function: low average   Psychosis: denies all      Musculoskeletal:  No tremor  "    Social: lives with parent  / likes biology / science / recently hired [employer]     AIMS score zero (12-7-22)     Patient Active Problem List   Diagnosis    ADD (attention deficit disorder with hyperactivity)    Anxiety disorder    Right foot pain    Adult BMI 38.0-38.9 kg/sq m (6'3" and 310 lb)    Bipolar 1 disorder (Jan 25 2022: referred in on Latuda 40 mg)    Gender identity issues; in adol identified some  as female tho parents in record report that actauly no attraction to either male or female    Autism disorder ( formerly Asperger)    Bipolar 1 disorder, mixed, moderate    BMI 40.0-44.9, adult          Current Outpatient Medications:     asenapine maleate (SAPHRIS) 5 mg Subl, Place 1 tablet (5 mg total) under the tongue every evening., Disp: 30 tablet, Rfl: 2    [START ON 1/4/2023] dextroamphetamine-amphetamine (ADDERALL XR) 20 MG 24 hr capsule, Take 1 capsule (20 mg total) by mouth every morning., Disp: 30 capsule, Rfl: 0    [START ON 2/3/2023] dextroamphetamine-amphetamine (ADDERALL XR) 20 MG 24 hr capsule, Take 1 capsule (20 mg total) by mouth every morning., Disp: 30 capsule, Rfl: 0    [START ON 3/3/2023] dextroamphetamine-amphetamine (ADDERALL XR) 20 MG 24 hr capsule, Take 1 capsule (20 mg total) by mouth every morning., Disp: 30 capsule, Rfl: 0    [START ON 1/4/2023] dextroamphetamine-amphetamine 10 mg Tab, Take 1 tablet (10 mg total) by mouth Daily. In afternoon approx 6 hours after morning dose of Adderall XR supply, Disp: 30 tablet, Rfl: 0    [START ON 2/3/2023] dextroamphetamine-amphetamine 10 mg Tab, Take 1 tablet (10 mg total) by mouth Daily. In afternoon approx 6 hours after morning dose of Adderall XR supply, Disp: 30 tablet, Rfl: 0    [START ON 3/3/2023] dextroamphetamine-amphetamine 10 mg Tab, Take 1 tablet (10 mg total) by mouth Daily. In afternoon approx 6 hours after morning dose of Adderall XR supply, Disp: 30 tablet, Rfl: 0    FLUoxetine 20 MG capsule, Take 3 capsules (60 mg " "total) by mouth once daily., Disp: 270 capsule, Rfl: 0    ketoconazole (NIZORAL) 2 % shampoo, Apply topically 3 (three) times a week., Disp: 120 mL, Rfl: 2    lurasidone (LATUDA) 80 mg Tab tablet, Take 1 tablet (80 mg total) by mouth once daily., Disp: 90 tablet, Rfl: 0    ofloxacin (FLOXIN) 0.3 % otic solution, Place 10 drops into both ears once daily. (Patient not taking: Reported on 11/15/2022), Disp: 10 mL, Rfl: 0    sars-cov-2, covid-19, (PFIZER COVID-19) 30 mcg/0.3 ml injection, Inject into the muscle. (Patient not taking: Reported on 8/31/2022), Disp: 0.3 mL, Rfl: 0     Social History     Tobacco Use   Smoking Status Never   Smokeless Tobacco Never        Review of patient's allergies indicates:   Allergen Reactions    Abilify [aripiprazole] Other (See Comments)     Some impulsive buying (Xbox: Dec 2022) and some wt gain        ASSESSMENT:   Encounter Diagnoses   Name Primary?    Bipolar 1 disorder, mixed, moderate Yes    Autism disorder ( formerly Asperger)     Attention deficit disorder with hyperactivity     Anxiety disorder, unspecified type     Gender Identity Issue: Oct 2022: pt says "still figuring it all out" ; saying "not feel one or the other" / tho parents report actually no attraction to either male or female     BMI 40.0-44.9, adult (Oct 2022: 6' 3"and 329 lb)        Patient Instructions     PLAN:     Follow up  D Post MD Jan 19 2023 8:30a IN CLINIC    Gave Herminio my [facility] contact  card to give to his behavior counselor Cheyenne Flowers PhD / re collab of care     Meds:  in light some impulsive spending +/- some wt gain, D/C Abilify (adding as intolerant); will replace with Saphris 5 mg once at bed / remain on Latuda 80 mg     Other psyc meds remain the same>  prozac  60mg a day / adderall XR 20 mg and adderall immed release 10 mg 6 hrs after the Adderall  XR doing    References:     Relaxation stress reduction workbook: GODFREY Garza PhD ( used: $7-10)    Feeling Good Website: Dusty Krishnamurthy MD " "/ www.feelinggood.com website (free) / varsha. PODCASTS    Anxiety &  phobia workbook by SHANTI Mckeon PhD  (web retailers: used: $ 7-10)    [place]: Path to Better Sleep : https://www.veterantraining.va.gov/insomnia/ (free)       Pt expressed appreciation for the visit today and did not have further question at this time though pt  was still informed to:     Call  if problems.    Call / Report Side Effects to [facility]sm[facility] BARBER     Encouraged to follow up with primary care / Gen Med MD for continued monitoring of general health and wellness.    Understanding was expressed; and no further concerns nor questions were raised at this time.     remember healthy self care:   eat right  attempt adequate rest   HANDWASHING / encourage such varsha. During this corona virus time   walk or light exercise within reason and as your general med team approves  read or explore any of reference materials / homework mentioned  reach out (I.e.,  connect with)  others who nuture and bring out best in you  avoid risky behaviors  keep your appointments  IF you  cannot make your appt THEN please call or go online to reschedule.  avoid  alcohol and illicit substances.  Look for the positive.  All is often relative-seek balance  Call sooner if needed : [phone]   Call 911 or go to Emergency Room  (ER)  if any acute concerns    >> BACKGROUND INFO : From Admit Pearl Ortega  <<    Herminio Turner a 21 y.o. ADOPTED (at birth)  male  Who is: 6' 3"  and 310 lbs.  Adoption was at birth; says that it is a CLOSED adoption. Says he has been told bio mom had mental health and substance issues.     Mr Turner (Herminio)  presents today as referred by Giancarlo Mesa MD  [facility] PCP.      Pt tells me that  I also know his dad:  Mr Tan Turner (a very nice capable pharmaceutical  representative  who also happens to be Colonel in [employer]. His mom is Concepcion Turner; she was from [place], [place]     He had been followed by   [facility]ay [facility]     Referred in with diagnostics of  Autism, ADHD, Bipolar and Major Depression as well as reference to pt having Gender Identity Issues 'not knowing preference as to male or female.  Denies Substance use.     Says intent is to get all (or as many providers in [facility]) as he able.     Says he likes his meds     Prozac 20 mg x3 a day (60 mg total daily)  Latuda 40 mg  referred in on MYDAYIS (dextroamphtamine-amphetamine CT 24) 37.5 mg  And ALSO TAKES dextroamphtamine-amphetamine 10 mg (TWO) tabs =20 mg) IN ADDITION to MYDAYIS in morning; says no longer takes an additional tab later in day     D Post MD did check [place] Pharm Monitor     Mr Turner (Herminio)  Brings in several prior treatment records from:     [facility] [address] [facility] [phone] (ph and fax) : [email or web address]@"[email or web address] Developmental report done by Leti Lawson (Pat) Psychologist #[id number] ; report of  5-13-17 when he was 16 yrs old ; in 8th grade; Herminio was said brought in for re exam of ADHD which report references was previously varsha at age 8;      SEE  TESTING Report SUBMITTED FOR SCANNING: includes in part:      Wechsler IQ; parent teacher  Rating;   ADHD Eval (Lei CPT III with teacher parent and self form  Autism testing: [place] Asperger's Disorder scale; Social Responsiveness scale  Autism Diagnostic Observation System (ADOS) and   Multidimentional Anxiety Scale for kids  MASC-2)     Some comments from Dr Bria Timmons report:   -Did not speak until 3.5 yrs old  -Diagnosed ADHD at age 8 yrs old  -Rx glasses age 9 yrs   -Phys therapy for hypotonia (at 16 yrs)  -At age 15y began having thoughts he was a female; participated in therapy with Aziza Linares LCSW to address sexual identity ; there was discussion of taking medication to block Yazdanism of puberty; therapist is said to have had some expertise / interest in transgender issues.'     SEE FULL REPORT (Dr Miles AS SUBMITTING FOR SCANNING  "ITNO EPIC   Parents reported that Herminio really has 'NO SEXUAL IDENTITY; professing no interest in boys or girls.     Herminio was reported as doing well in school; mother studies with him nightly ; he is said not to have studied on own; 'trouble understanding needs of others around him; said to be sweet and loving child; socially aggravates other students struggling with social skills; not asking teachers or other students for help; fixates on things like building own computer likes video usama and would stay on computer al anil iof allowed; little interest in socializing with otehrs around him angelica enjoys usama with people all around the world; he is said to be very literal; quirky sense humor other not understand.  Mom is stay at home dad is \  Amy has some speech articulation issues     On Weschler Full Scale IQ  83 (true range 79-89 placing at 13th %ile; low avg range  On another measure the General Ability Index (GAI) Herminio did significantly better and mention made of cautiously interpreting FSIQ.  On GAI he scored 95 with confidence interval of 90-101and "in average range."     WISC V scores:  Verbal Comprehension VCI 89 23%;   Visual Spatial Index VSI 92 30%ile  Fluid Reasoning Index  73%  Working memory Index WMI 79 8 %  Processing Speed Index PSI  72 3%      ADHD Assesment: Lei 3 : "eelvated T scores for ALL scales except Defiant. " Lei Probablility Score = scored 99%; indicating individuals with ADHD obtained  This probability score 99% of time.       Adaptive function Sore ABAS II composite score of 64; such reflects Herminio score as as good or btter than 1% of children his same age / extremely low.     [place] Asperger Disorder Scale GADS   Social Interaction score 9 (37%ile)  Restricted Pattern behavior score 14 (91% ile)  Cognitive Pattern score 14 (91 %ile)  Pragmatic Skills score 11 (63%ile)  Asperger quotient score  113 (81 %ile)      MMPI high on depression " "general apathy ; lack confidence; social isolation; other trends reflect anxiety self preoccupation and rigidity and may show overreaction to life events      Also  Porsche Paz MSW NORMAN [email or web address] ; ph:[phone] ; fax: [phone] ; says does not see her any longer ; says saw her in 10th / 11th grade    He  Said came over here to [facility] to unify care providers.      See packet of materials (SCANNED to Epic  ) as noted above which do support diagnosis of ADHD and autism.     His self rating scales are also recorded below.     He did endorse 9 of 13 items on Mood Disorder Questionnaire (MDQ)   Prior  Treatment    Inpatient  None  Outpatient: In past (10th / 11th grade) Porsche Paz MSW NORMAN [email or web address] ; ph:[phone] ; fax: [phone] and then was seeing Gray [facility]    Physical Abuse: N  Sexual abuse  N     Psychosis: N     Substance Use:     Alcohol: says no control; tho has tried; has enjoyed tho notes was not much; maybe had 1 beer a week tho parents locked it up as a precaution (birth mom had issues alcohol and said to have used 'other drugs' and bipolar so parents being cautious)     3 wishes? : money, car / not think of a 3rd desire     PSYCHO-SOCIAL DEVELOPMENT HISTORY:      City [facility]: [facility]     Siblings:  Sisters: he has one adopted sister (not bio related) ; sister was adopted  from [place] when sister was  33 months      Parents : alive      Briefly Describe  your Mom: kind ; sometimes speaks before thinking  Briefly Describe your Dad: very hard working  and kind     Bio Mom: Occupation: homemaker  Bio Dad:  Occupation: pharma rep and in [employer]      Marital Status:  Single     Sexual orientation: says no strong "pull attraction to either sex" tho could find either attractive so he says that is why he says  bisexual tho says he has not had long term relationship      Education: grad " [employer] ([employer] regular classes) / [employer] it ;  Went [employer] Aug to Dec 2021 (on campus)     Tenriism / Spiritual: n     Legal Issues? n  DWI ?n  MCFP time?n     Employment:  at [employer] says looking  for another job     On Disability? n

## 2022-12-08 ENCOUNTER — PATIENT MESSAGE (OUTPATIENT)
Dept: PULMONOLOGY | Facility: CLINIC | Age: 22
End: 2022-12-08
Payer: OTHER GOVERNMENT

## 2022-12-09 ENCOUNTER — OFFICE VISIT (OUTPATIENT)
Dept: PULMONOLOGY | Facility: CLINIC | Age: 22
End: 2022-12-09
Payer: OTHER GOVERNMENT

## 2022-12-09 VITALS
HEIGHT: 75 IN | BODY MASS INDEX: 39.17 KG/M2 | WEIGHT: 315 LBS | SYSTOLIC BLOOD PRESSURE: 120 MMHG | OXYGEN SATURATION: 98 % | RESPIRATION RATE: 16 BRPM | DIASTOLIC BLOOD PRESSURE: 84 MMHG | HEART RATE: 113 BPM

## 2022-12-09 DIAGNOSIS — G47.33 OSA (OBSTRUCTIVE SLEEP APNEA): Primary | ICD-10-CM

## 2022-12-09 DIAGNOSIS — E66.01 MORBID OBESITY WITH BMI OF 40.0-44.9, ADULT: ICD-10-CM

## 2022-12-09 DIAGNOSIS — G47.10 HYPERSOMNIA: ICD-10-CM

## 2022-12-09 PROCEDURE — 99999 PR PBB SHADOW E&M-EST. PATIENT-LVL IV: ICD-10-PCS | Mod: PBBFAC,,, | Performed by: NURSE PRACTITIONER

## 2022-12-09 PROCEDURE — 99999 PR PBB SHADOW E&M-EST. PATIENT-LVL IV: CPT | Mod: PBBFAC,,, | Performed by: NURSE PRACTITIONER

## 2022-12-09 PROCEDURE — 99214 PR OFFICE/OUTPT VISIT, EST, LEVL IV, 30-39 MIN: ICD-10-PCS | Mod: S$PBB,,, | Performed by: NURSE PRACTITIONER

## 2022-12-09 PROCEDURE — 99214 OFFICE O/P EST MOD 30 MIN: CPT | Mod: PBBFAC | Performed by: NURSE PRACTITIONER

## 2022-12-09 PROCEDURE — 99214 OFFICE O/P EST MOD 30 MIN: CPT | Mod: S$PBB,,, | Performed by: NURSE PRACTITIONER

## 2022-12-09 NOTE — PROGRESS NOTES
"Subjective:      Patient ID: Herminio Turner is a 22 y.o. male.    Chief Complaint: Apnea    HPI  Presents to office for review of AutoPAP therapy. Patient states improved symptoms with use of AutoPAP. Sleeping more soundly. Waking up feeling more refreshed. Improved daytime sleepiness. Patient states he is benefiting from use of the AutoPAP.      Patient Active Problem List   Diagnosis    ADD (attention deficit disorder with hyperactivity)    Anxiety disorder    Right foot pain    Adult BMI 38.0-38.9 kg/sq m (6'3" and 310 lb)    Bipolar 1 disorder (Jan 25 2022: referred in on Latuda 40 mg)    Gender identity issues; in adol identified some  as female tho parents in record report that actauly no attraction to either male or female    Autism disorder ( formerly Asperger)    Bipolar 1 disorder, mixed, moderate    BMI 40.0-44.9, adult       /84   Pulse (!) 113   Resp 16   Ht 6' 3" (1.905 m)   Wt (!) 152.4 kg (335 lb 15.7 oz)   SpO2 98%   BMI 41.99 kg/m²   Body mass index is 41.99 kg/m².    Review of Systems   Constitutional: Negative.    HENT: Negative.     Respiratory: Negative.     Cardiovascular: Negative.    Musculoskeletal: Negative.    Gastrointestinal: Negative.    Neurological: Negative.    Psychiatric/Behavioral: Negative.     Objective:      Physical Exam  Constitutional:       Appearance: He is well-developed. He is obese.   HENT:      Head: Normocephalic and atraumatic.      Nose: Nose normal.      Mouth/Throat:      Pharynx: Oropharynx is clear.   Neck:      Thyroid: No thyroid mass or thyromegaly.      Trachea: Trachea normal.   Cardiovascular:      Rate and Rhythm: Normal rate and regular rhythm.      Heart sounds: Normal heart sounds.   Pulmonary:      Effort: Pulmonary effort is normal.      Breath sounds: Normal breath sounds. No wheezing, rhonchi or rales.   Abdominal:      Palpations: Abdomen is soft. There is no splenomegaly.      Tenderness: There is no abdominal tenderness. "   Musculoskeletal:         General: Normal range of motion.      Cervical back: Normal range of motion and neck supple.   Skin:     General: Skin is warm and dry.   Neurological:      Mental Status: He is alert and oriented to person, place, and time.   Psychiatric:         Mood and Affect: Mood normal.         Behavior: Behavior normal.     Personal Diagnostic Review    Compliance Report  Compliance  Payor Standard  Usage 09/10/2022 - 12/08/2022  Usage days 74/90 days (82%)  >= 4 hours 66 days (73%)  < 4 hours 8 days (9%)  Usage hours 658 hours 34 minutes  Average usage (total days) 7 hours 19 minutes  Average usage (days used) 8 hours 54 minutes  Median usage (days used) 9 hours 51 minutes  Total used hours (value since last reset - 12/08/2022) 2,968 hours  AirSense 10 AutoSet  Serial number [id number]  Mode AutoSet  Min Pressure 6 cmH2O  Max Pressure 20 cmH2O  EPR Fulltime  EPR level 3  Response Standard  Therapy  Pressure - cmH2O Median: 6.9 95th percentile: 8.5 Maximum: 9.5  Leaks - L/min Median: 1.8 95th percentile: 10.3 Maximum: 65.9  Events per hour AI: 1.1 HI: 0.0 AHI: 1.1  Apnea Index Central: 0.0 Obstructive: 0.8 Unknown: 0.2  RERA Index 0.0    Assessment:       1. CHARLIE (obstructive sleep apnea)    2. Morbid obesity with BMI of 40.0-44.9, adult    3. Hypersomnia        Outpatient Encounter Medications as of 12/9/2022   Medication Sig Dispense Refill    asenapine maleate (SAPHRIS) 5 mg Subl Place 1 tablet (5 mg total) under the tongue every evening. 30 tablet 2    [START ON 1/4/2023] dextroamphetamine-amphetamine (ADDERALL XR) 20 MG 24 hr capsule Take 1 capsule (20 mg total) by mouth every morning. 30 capsule 0    [START ON 2/3/2023] dextroamphetamine-amphetamine (ADDERALL XR) 20 MG 24 hr capsule Take 1 capsule (20 mg total) by mouth every morning. 30 capsule 0    [START ON 3/3/2023] dextroamphetamine-amphetamine (ADDERALL XR) 20 MG 24 hr capsule Take 1 capsule (20 mg total) by mouth every morning. 30  capsule 0    [START ON 1/4/2023] dextroamphetamine-amphetamine 10 mg Tab Take 1 tablet (10 mg total) by mouth Daily. In afternoon approx 6 hours after morning dose of Adderall XR supply 30 tablet 0    [START ON 2/3/2023] dextroamphetamine-amphetamine 10 mg Tab Take 1 tablet (10 mg total) by mouth Daily. In afternoon approx 6 hours after morning dose of Adderall XR supply 30 tablet 0    [START ON 3/3/2023] dextroamphetamine-amphetamine 10 mg Tab Take 1 tablet (10 mg total) by mouth Daily. In afternoon approx 6 hours after morning dose of Adderall XR supply 30 tablet 0    FLUoxetine 20 MG capsule Take 3 capsules (60 mg total) by mouth once daily. 270 capsule 0    ketoconazole (NIZORAL) 2 % shampoo Apply topically 3 (three) times a week. 120 mL 2    lurasidone (LATUDA) 80 mg Tab tablet Take 1 tablet (80 mg total) by mouth once daily. 90 tablet 0    ofloxacin (FLOXIN) 0.3 % otic solution Place 10 drops into both ears once daily. 10 mL 0    sars-cov-2, covid-19, (PFIZER COVID-19) 30 mcg/0.3 ml injection Inject into the muscle. 0.3 mL 0     No facility-administered encounter medications on file as of 12/9/2022.     Orders Placed This Encounter   Procedures    CPAP/BIPAP SUPPLIES     Order Specific Question:   Length of need (1-99 months):     Answer:   99     Order Specific Question:   Choose ONE mask type and its corresponding cushions and/or pillows:     Answer:    Full Face Mask, 1 per 90 days:  Full Face Cushion, (3 per 90 days)     Order Specific Question:   Choose EITHER Heated or Non-Heated Tubjing     Answer:    Heated Tubing, 1 per 6 months     Order Specific Question:   All other supplies as needed as listed below:     Answer:    Headgear, 1 per 180 days     Order Specific Question:   All other supplies as needed as listed below:     Answer:    Disposable Filter, 6 per 90 days     Order Specific Question:   All other supplies as needed as listed below:     Answer:    Non-Disposable  Filter, 1 per 180 days     Order Specific Question:   All other supplies as needed as listed below:     Answer:    Humidifier Chamber, 1 per 180 days       Plan:   Compliant with PAP and benefits from use. Follow up annually in the sleep clinic.  Weight loss and exercise to improve overall health.  Obtain 8 hr sleep.        Problem List Items Addressed This Visit    None  Visit Diagnoses       CHARLIE (obstructive sleep apnea)    -  Primary    Relevant Orders    CPAP/BIPAP SUPPLIES    Morbid obesity with BMI of 40.0-44.9, adult        Hypersomnia

## 2023-01-19 ENCOUNTER — OFFICE VISIT (OUTPATIENT)
Dept: PSYCHIATRY | Facility: CLINIC | Age: 23
End: 2023-01-19
Payer: OTHER GOVERNMENT

## 2023-01-19 ENCOUNTER — PATIENT MESSAGE (OUTPATIENT)
Dept: PSYCHIATRY | Facility: CLINIC | Age: 23
End: 2023-01-19
Payer: OTHER GOVERNMENT

## 2023-01-19 VITALS
HEART RATE: 100 BPM | DIASTOLIC BLOOD PRESSURE: 84 MMHG | BODY MASS INDEX: 43.62 KG/M2 | SYSTOLIC BLOOD PRESSURE: 120 MMHG | WEIGHT: 315 LBS

## 2023-01-19 DIAGNOSIS — F31.62 BIPOLAR 1 DISORDER, MIXED, MODERATE: ICD-10-CM

## 2023-01-19 DIAGNOSIS — F41.9 ANXIETY DISORDER, UNSPECIFIED TYPE: ICD-10-CM

## 2023-01-19 DIAGNOSIS — F84.0 AUTISM DISORDER: ICD-10-CM

## 2023-01-19 DIAGNOSIS — F90.9 ATTENTION DEFICIT DISORDER WITH HYPERACTIVITY: ICD-10-CM

## 2023-01-19 DIAGNOSIS — F66 GENDER IDENTITY UNCERTAINTY: ICD-10-CM

## 2023-01-19 DIAGNOSIS — F31.62 BIPOLAR 1 DISORDER, MIXED, MODERATE: Primary | ICD-10-CM

## 2023-01-19 PROCEDURE — 99999 PR PBB SHADOW E&M-EST. PATIENT-LVL II: CPT | Mod: PBBFAC,,, | Performed by: PSYCHIATRY & NEUROLOGY

## 2023-01-19 PROCEDURE — 99212 OFFICE O/P EST SF 10 MIN: CPT | Mod: PBBFAC | Performed by: PSYCHIATRY & NEUROLOGY

## 2023-01-19 PROCEDURE — 99215 PR OFFICE/OUTPT VISIT, EST, LEVL V, 40-54 MIN: ICD-10-PCS | Mod: S$PBB,,, | Performed by: PSYCHIATRY & NEUROLOGY

## 2023-01-19 PROCEDURE — 99215 OFFICE O/P EST HI 40 MIN: CPT | Mod: S$PBB,,, | Performed by: PSYCHIATRY & NEUROLOGY

## 2023-01-19 PROCEDURE — 99999 PR PBB SHADOW E&M-EST. PATIENT-LVL II: ICD-10-PCS | Mod: PBBFAC,,, | Performed by: PSYCHIATRY & NEUROLOGY

## 2023-01-19 RX ORDER — ASENAPINE 5 MG/1
5 TABLET SUBLINGUAL NIGHTLY
Qty: 30 TABLET | Refills: 2 | Status: SHIPPED | OUTPATIENT
Start: 2023-01-19 | End: 2023-01-19 | Stop reason: SDUPTHER

## 2023-01-19 RX ORDER — LURASIDONE HYDROCHLORIDE 80 MG/1
80 TABLET, FILM COATED ORAL DAILY
Qty: 90 TABLET | Refills: 0 | Status: SHIPPED | OUTPATIENT
Start: 2023-01-19 | End: 2023-03-10 | Stop reason: ALTCHOICE

## 2023-01-19 RX ORDER — DEXTROAMPHETAMINE SACCHARATE, AMPHETAMINE ASPARTATE, DEXTROAMPHETAMINE SULFATE AND AMPHETAMINE SULFATE 2.5; 2.5; 2.5; 2.5 MG/1; MG/1; MG/1; MG/1
10 TABLET ORAL DAILY
Qty: 30 TABLET | Refills: 0 | Status: SHIPPED | OUTPATIENT
Start: 2023-03-31 | End: 2023-02-09 | Stop reason: SDUPTHER

## 2023-01-19 RX ORDER — FLUOXETINE HYDROCHLORIDE 20 MG/1
60 CAPSULE ORAL DAILY
Qty: 270 CAPSULE | Refills: 0 | Status: SHIPPED | OUTPATIENT
Start: 2023-01-19 | End: 2023-04-04 | Stop reason: SDUPTHER

## 2023-01-19 RX ORDER — FLUOXETINE HYDROCHLORIDE 20 MG/1
60 CAPSULE ORAL DAILY
Qty: 270 CAPSULE | Refills: 0 | Status: SHIPPED | OUTPATIENT
Start: 2023-01-19 | End: 2023-01-19 | Stop reason: SDUPTHER

## 2023-01-19 RX ORDER — DEXTROAMPHETAMINE SACCHARATE, AMPHETAMINE ASPARTATE, DEXTROAMPHETAMINE SULFATE AND AMPHETAMINE SULFATE 2.5; 2.5; 2.5; 2.5 MG/1; MG/1; MG/1; MG/1
10 TABLET ORAL DAILY
Qty: 30 TABLET | Refills: 0 | Status: SHIPPED | OUTPATIENT
Start: 2023-03-03 | End: 2023-02-09

## 2023-01-19 RX ORDER — DEXTROAMPHETAMINE SACCHARATE, AMPHETAMINE ASPARTATE MONOHYDRATE, DEXTROAMPHETAMINE SULFATE AND AMPHETAMINE SULFATE 5; 5; 5; 5 MG/1; MG/1; MG/1; MG/1
20 CAPSULE, EXTENDED RELEASE ORAL EVERY MORNING
Qty: 30 CAPSULE | Refills: 0 | Status: SHIPPED | OUTPATIENT
Start: 2023-03-31 | End: 2023-02-09 | Stop reason: SDUPTHER

## 2023-01-19 RX ORDER — DEXTROAMPHETAMINE SACCHARATE, AMPHETAMINE ASPARTATE MONOHYDRATE, DEXTROAMPHETAMINE SULFATE AND AMPHETAMINE SULFATE 5; 5; 5; 5 MG/1; MG/1; MG/1; MG/1
20 CAPSULE, EXTENDED RELEASE ORAL EVERY MORNING
Qty: 30 CAPSULE | Refills: 0 | Status: SHIPPED | OUTPATIENT
Start: 2023-03-03 | End: 2023-02-09

## 2023-01-19 RX ORDER — DEXTROAMPHETAMINE SACCHARATE, AMPHETAMINE ASPARTATE MONOHYDRATE, DEXTROAMPHETAMINE SULFATE AND AMPHETAMINE SULFATE 5; 5; 5; 5 MG/1; MG/1; MG/1; MG/1
20 CAPSULE, EXTENDED RELEASE ORAL EVERY MORNING
Qty: 30 CAPSULE | Refills: 0 | Status: SHIPPED | OUTPATIENT
Start: 2023-02-03 | End: 2023-02-09

## 2023-01-19 RX ORDER — DEXTROAMPHETAMINE SACCHARATE, AMPHETAMINE ASPARTATE, DEXTROAMPHETAMINE SULFATE AND AMPHETAMINE SULFATE 2.5; 2.5; 2.5; 2.5 MG/1; MG/1; MG/1; MG/1
10 TABLET ORAL DAILY
Qty: 30 TABLET | Refills: 0 | Status: SHIPPED | OUTPATIENT
Start: 2023-02-03 | End: 2023-02-09
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
## 2023-01-19 NOTE — PATIENT INSTRUCTIONS
"  PLAN:       D Post Psyc  MD informed pt that he is moving to [facility] March 2023; noted that [facility]  will reassign pt  to another [place] based prescriber.     Follow Up     3/1/2023  1:30 PM NEW PATIENT - PSYCHIATRY ([facility]) [place] [facility] Silver Beltran MD   Location Instructions:    3rd Floor            Note dec 2022: MD Gave Herminio my [facility] contact  card to give to his behavior counselor Cheyenne Flowers PhD / re collab of care ; he and mother say ok for [facility] to contact her    Meds:  had some wt gain Abilify ; then did move to [facility] and likes it "mood steadier" would consider moving Saphris up and Latuda down tho defer to Dr Beltran who will soon be taking over this case management as I, D Post MD, am moving to [facility] ([place]). Other meds renew as prior including Adderall XR 20 mg and Adderall immed release 10 mg for afternoon    Other psyc meds remain the same>  prozac  60mg a day / adderall XR 20 mg and adderall immed release 10 mg 6 hrs after the Adderall  XR doing; as well as prozac 60 mg a day (via three 20 mg capsules)    References:     Relaxation stress reduction workbook: GODFREY Garza PhD ( used: $7-10)    Feeling Good Website: Dusty Krishnamurthy MD / www.[employer] website (free) / varsha. PODCASTS    Anxiety &  phobia workbook by SHANTI Mckeon PhD  (web retailers: used: $ 7-10)    [place]: Path to Better Sleep : https://www.veterantraining.va.gov/insomnia/ (free)       Pt expressed appreciation for the visit today and did not have further question at this time though pt  was still informed to:     Call  if problems.    Call / Report Side Effects to Psmark BARBER     Encouraged to follow up with primary care / Gen Med MD for continued monitoring of general health and wellness.    Understanding was expressed; and no further concerns nor questions were raised at this time.     remember healthy self care:   eat right  attempt adequate rest   HANDWASHING / encourage such " varsha. During this corona virus time   walk or light exercise within reason and as your general med team approves  read or explore any of reference materials / homework mentioned  reach out (I.e.,  connect with)  others who nuture and bring out best in you  avoid risky behaviors  keep your appointments  IF you  cannot make your appt THEN please call or go online to reschedule.  avoid  alcohol and illicit substances.  Look for the positive.  All is often relative-seek balance  Call sooner if needed : [phone]   Call 911 or go to Emergency Room  (ER)  if any acute concerns

## 2023-01-21 RX ORDER — ASENAPINE 5 MG/1
5 TABLET SUBLINGUAL NIGHTLY
Qty: 30 TABLET | Refills: 2 | Status: SHIPPED | OUTPATIENT
Start: 2023-01-21 | End: 2023-03-09

## 2023-02-07 ENCOUNTER — PATIENT MESSAGE (OUTPATIENT)
Dept: PSYCHIATRY | Facility: CLINIC | Age: 23
End: 2023-02-07
Payer: OTHER GOVERNMENT

## 2023-02-09 DIAGNOSIS — F90.9 ATTENTION DEFICIT DISORDER WITH HYPERACTIVITY: ICD-10-CM

## 2023-02-09 RX ORDER — DEXTROAMPHETAMINE SACCHARATE, AMPHETAMINE ASPARTATE, DEXTROAMPHETAMINE SULFATE AND AMPHETAMINE SULFATE 2.5; 2.5; 2.5; 2.5 MG/1; MG/1; MG/1; MG/1
10 TABLET ORAL DAILY
Qty: 30 TABLET | Refills: 0 | Status: SHIPPED | OUTPATIENT
Start: 2023-02-09 | End: 2023-03-01 | Stop reason: SDUPTHER

## 2023-02-09 RX ORDER — DEXTROAMPHETAMINE SACCHARATE, AMPHETAMINE ASPARTATE MONOHYDRATE, DEXTROAMPHETAMINE SULFATE AND AMPHETAMINE SULFATE 5; 5; 5; 5 MG/1; MG/1; MG/1; MG/1
20 CAPSULE, EXTENDED RELEASE ORAL EVERY MORNING
Qty: 30 CAPSULE | Refills: 0 | Status: SHIPPED | OUTPATIENT
Start: 2023-02-09 | End: 2023-03-01 | Stop reason: SDUPTHER

## 2023-02-09 RX ORDER — DEXTROAMPHETAMINE SACCHARATE, AMPHETAMINE ASPARTATE MONOHYDRATE, DEXTROAMPHETAMINE SULFATE AND AMPHETAMINE SULFATE 5; 5; 5; 5 MG/1; MG/1; MG/1; MG/1
20 CAPSULE, EXTENDED RELEASE ORAL EVERY MORNING
Qty: 30 CAPSULE | Refills: 0 | Status: SHIPPED | OUTPATIENT
Start: 2023-02-09 | End: 2023-02-09

## 2023-02-09 NOTE — PROGRESS NOTES
Supply chain issue // cancelled [facility]  ERx  and wrote for:     Adderall XR 20 mg q AM sent to [facility]  #30 no refill  Adderall 10 mg immed release for 6 hrs later #30  no refill     Herminio has appt with Dr Beltran  for 3-1-23 1:30p in clinic at [facility] [place]   (as D Post MD moving to [facility] [place])    D Post MD

## 2023-02-27 ENCOUNTER — TELEPHONE (OUTPATIENT)
Dept: PSYCHIATRY | Facility: CLINIC | Age: 23
End: 2023-02-27
Payer: OTHER GOVERNMENT

## 2023-03-01 ENCOUNTER — OFFICE VISIT (OUTPATIENT)
Dept: PSYCHIATRY | Facility: CLINIC | Age: 23
End: 2023-03-01
Payer: OTHER GOVERNMENT

## 2023-03-01 VITALS
BODY MASS INDEX: 45.44 KG/M2 | WEIGHT: 315 LBS | SYSTOLIC BLOOD PRESSURE: 127 MMHG | DIASTOLIC BLOOD PRESSURE: 80 MMHG | HEART RATE: 87 BPM

## 2023-03-01 DIAGNOSIS — F41.9 ANXIETY DISORDER, UNSPECIFIED TYPE: ICD-10-CM

## 2023-03-01 DIAGNOSIS — F31.9 BIPOLAR AFFECTIVE DISORDER, REMISSION STATUS UNSPECIFIED: Primary | ICD-10-CM

## 2023-03-01 DIAGNOSIS — F90.9 ATTENTION DEFICIT DISORDER WITH HYPERACTIVITY: ICD-10-CM

## 2023-03-01 DIAGNOSIS — Z79.899 HIGH RISK MEDICATION USE: ICD-10-CM

## 2023-03-01 DIAGNOSIS — F84.0 AUTISM DISORDER: ICD-10-CM

## 2023-03-01 PROCEDURE — 99999 PR PBB SHADOW E&M-EST. PATIENT-LVL II: ICD-10-PCS | Mod: PBBFAC,,, | Performed by: PSYCHIATRY & NEUROLOGY

## 2023-03-01 PROCEDURE — 99212 OFFICE O/P EST SF 10 MIN: CPT | Mod: PBBFAC | Performed by: PSYCHIATRY & NEUROLOGY

## 2023-03-01 PROCEDURE — 99999 PR PBB SHADOW E&M-EST. PATIENT-LVL II: CPT | Mod: PBBFAC,,, | Performed by: PSYCHIATRY & NEUROLOGY

## 2023-03-01 PROCEDURE — 99215 PR OFFICE/OUTPT VISIT, EST, LEVL V, 40-54 MIN: ICD-10-PCS | Mod: S$PBB,,, | Performed by: PSYCHIATRY & NEUROLOGY

## 2023-03-01 PROCEDURE — 99215 OFFICE O/P EST HI 40 MIN: CPT | Mod: S$PBB,,, | Performed by: PSYCHIATRY & NEUROLOGY

## 2023-03-01 RX ORDER — DEXTROAMPHETAMINE SACCHARATE, AMPHETAMINE ASPARTATE, DEXTROAMPHETAMINE SULFATE AND AMPHETAMINE SULFATE 2.5; 2.5; 2.5; 2.5 MG/1; MG/1; MG/1; MG/1
10 TABLET ORAL DAILY
Qty: 30 TABLET | Refills: 0 | Status: SHIPPED | OUTPATIENT
Start: 2023-03-01 | End: 2023-04-04 | Stop reason: SDUPTHER

## 2023-03-01 RX ORDER — DEXTROAMPHETAMINE SACCHARATE, AMPHETAMINE ASPARTATE MONOHYDRATE, DEXTROAMPHETAMINE SULFATE AND AMPHETAMINE SULFATE 5; 5; 5; 5 MG/1; MG/1; MG/1; MG/1
20 CAPSULE, EXTENDED RELEASE ORAL EVERY MORNING
Qty: 30 CAPSULE | Refills: 0 | Status: SHIPPED | OUTPATIENT
Start: 2023-03-01 | End: 2023-03-13 | Stop reason: SDUPTHER
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
## 2023-03-03 ENCOUNTER — LAB VISIT (OUTPATIENT)
Dept: LAB | Facility: HOSPITAL | Age: 23
End: 2023-03-03
Attending: PSYCHIATRY & NEUROLOGY
Payer: OTHER GOVERNMENT

## 2023-03-03 DIAGNOSIS — Z79.899 HIGH RISK MEDICATION USE: ICD-10-CM

## 2023-03-03 DIAGNOSIS — F31.9 BIPOLAR AFFECTIVE DISORDER, REMISSION STATUS UNSPECIFIED: ICD-10-CM

## 2023-03-03 LAB
ALBUMIN SERPL BCP-MCNC: 3.9 G/DL (ref 3.5–5.2)
ALP SERPL-CCNC: 106 U/L (ref 55–135)
ALT SERPL W/O P-5'-P-CCNC: 107 U/L (ref 10–44)
ANION GAP SERPL CALC-SCNC: 10 MMOL/L (ref 8–16)
AST SERPL-CCNC: 65 U/L (ref 10–40)
BILIRUB SERPL-MCNC: 0.5 MG/DL (ref 0.1–1)
BUN SERPL-MCNC: 9 MG/DL (ref 6–20)
CALCIUM SERPL-MCNC: 10.1 MG/DL (ref 8.7–10.5)
CHLORIDE SERPL-SCNC: 105 MMOL/L (ref 95–110)
CHOLEST SERPL-MCNC: 174 MG/DL (ref 120–199)
CHOLEST/HDLC SERPL: 2.8 {RATIO} (ref 2–5)
CO2 SERPL-SCNC: 22 MMOL/L (ref 23–29)
CREAT SERPL-MCNC: 0.7 MG/DL (ref 0.5–1.4)
EST. GFR  (NO RACE VARIABLE): >60 ML/MIN/1.73 M^2
ESTIMATED AVG GLUCOSE: 105 MG/DL (ref 68–131)
GLUCOSE SERPL-MCNC: 81 MG/DL (ref 70–110)
HBA1C MFR BLD: 5.3 % (ref 4–5.6)
HDLC SERPL-MCNC: 62 MG/DL (ref 40–75)
HDLC SERPL: 35.6 % (ref 20–50)
LDLC SERPL CALC-MCNC: 94.4 MG/DL (ref 63–159)
NONHDLC SERPL-MCNC: 112 MG/DL
POTASSIUM SERPL-SCNC: 4.7 MMOL/L (ref 3.5–5.1)
PROT SERPL-MCNC: 7.4 G/DL (ref 6–8.4)
SODIUM SERPL-SCNC: 137 MMOL/L (ref 136–145)
T4 FREE SERPL-MCNC: 0.85 NG/DL (ref 0.71–1.51)
TRIGL SERPL-MCNC: 88 MG/DL (ref 30–150)
TSH SERPL DL<=0.005 MIU/L-ACNC: 3.83 UIU/ML (ref 0.4–4)

## 2023-03-03 PROCEDURE — 84443 ASSAY THYROID STIM HORMONE: CPT | Performed by: PSYCHIATRY & NEUROLOGY

## 2023-03-03 PROCEDURE — 80053 COMPREHEN METABOLIC PANEL: CPT | Performed by: PSYCHIATRY & NEUROLOGY

## 2023-03-03 PROCEDURE — 80061 LIPID PANEL: CPT | Performed by: PSYCHIATRY & NEUROLOGY

## 2023-03-03 PROCEDURE — 83036 HEMOGLOBIN GLYCOSYLATED A1C: CPT | Performed by: PSYCHIATRY & NEUROLOGY

## 2023-03-03 PROCEDURE — 84439 ASSAY OF FREE THYROXINE: CPT | Performed by: PSYCHIATRY & NEUROLOGY

## 2023-03-03 PROCEDURE — 36415 COLL VENOUS BLD VENIPUNCTURE: CPT | Mod: PO | Performed by: PSYCHIATRY & NEUROLOGY

## 2023-03-06 ENCOUNTER — PATIENT MESSAGE (OUTPATIENT)
Dept: PSYCHIATRY | Facility: CLINIC | Age: 23
End: 2023-03-06
Payer: OTHER GOVERNMENT

## 2023-03-06 NOTE — TELEPHONE ENCOUNTER
I called the patient and was unable to reach him regarding his lab results, which are unremarkable apart from elevations of AST and more so ALT.  I also sent a [employer] message.  In the voicemail message and in the MyChart message, I inquired about the patient's availability for an appointment on March 9.   Yes

## 2023-03-07 ENCOUNTER — TELEPHONE (OUTPATIENT)
Dept: PSYCHIATRY | Facility: CLINIC | Age: 23
End: 2023-03-07
Payer: OTHER GOVERNMENT

## 2023-03-09 ENCOUNTER — HOSPITAL ENCOUNTER (OUTPATIENT)
Dept: CARDIOLOGY | Facility: HOSPITAL | Age: 23
Discharge: HOME | End: 2023-03-09
Attending: PSYCHIATRY & NEUROLOGY
Payer: OTHER GOVERNMENT

## 2023-03-09 ENCOUNTER — OFFICE VISIT (OUTPATIENT)
Dept: PSYCHIATRY | Facility: CLINIC | Age: 23
End: 2023-03-09
Payer: OTHER GOVERNMENT

## 2023-03-09 DIAGNOSIS — F84.0 AUTISM DISORDER: ICD-10-CM

## 2023-03-09 DIAGNOSIS — F90.9 ATTENTION DEFICIT DISORDER WITH HYPERACTIVITY: ICD-10-CM

## 2023-03-09 DIAGNOSIS — F41.9 ANXIETY DISORDER, UNSPECIFIED TYPE: ICD-10-CM

## 2023-03-09 DIAGNOSIS — Z79.899 HIGH RISK MEDICATION USE: ICD-10-CM

## 2023-03-09 DIAGNOSIS — F31.9 BIPOLAR AFFECTIVE DISORDER, REMISSION STATUS UNSPECIFIED: Primary | ICD-10-CM

## 2023-03-09 PROCEDURE — 93010 EKG 12-LEAD: ICD-10-PCS | Mod: ,,, | Performed by: INTERNAL MEDICINE

## 2023-03-09 PROCEDURE — 93010 ELECTROCARDIOGRAM REPORT: CPT | Mod: ,,, | Performed by: INTERNAL MEDICINE

## 2023-03-09 PROCEDURE — 99214 PR OFFICE/OUTPT VISIT, EST, LEVL IV, 30-39 MIN: ICD-10-PCS | Mod: S$PBB,,, | Performed by: PSYCHIATRY & NEUROLOGY

## 2023-03-09 PROCEDURE — 99214 OFFICE O/P EST MOD 30 MIN: CPT | Mod: S$PBB,,, | Performed by: PSYCHIATRY & NEUROLOGY

## 2023-03-09 PROCEDURE — 93005 ELECTROCARDIOGRAM TRACING: CPT
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
## 2023-03-10 ENCOUNTER — PATIENT MESSAGE (OUTPATIENT)
Dept: PSYCHIATRY | Facility: CLINIC | Age: 23
End: 2023-03-10
Payer: OTHER GOVERNMENT

## 2023-03-10 DIAGNOSIS — F90.9 ATTENTION DEFICIT DISORDER WITH HYPERACTIVITY: ICD-10-CM

## 2023-03-10 DIAGNOSIS — F31.9 BIPOLAR AFFECTIVE DISORDER, REMISSION STATUS UNSPECIFIED: Primary | ICD-10-CM

## 2023-03-10 RX ORDER — DEXTROAMPHETAMINE SACCHARATE, AMPHETAMINE ASPARTATE MONOHYDRATE, DEXTROAMPHETAMINE SULFATE AND AMPHETAMINE SULFATE 5; 5; 5; 5 MG/1; MG/1; MG/1; MG/1
20 CAPSULE, EXTENDED RELEASE ORAL EVERY MORNING
Qty: 30 CAPSULE | Refills: 0 | OUTPATIENT
Start: 2023-03-10 | End: 2023-04-09

## 2023-03-10 RX ORDER — ZIPRASIDONE HYDROCHLORIDE 40 MG/1
40 CAPSULE ORAL 2 TIMES DAILY
Qty: 60 CAPSULE | Refills: 0 | Status: SHIPPED | OUTPATIENT
Start: 2023-03-10 | End: 2023-04-04 | Stop reason: SDUPTHER

## 2023-03-10 NOTE — TELEPHONE ENCOUNTER
I spoke with the patient with regards to EKG acceptability for Geodon.  I reviewed the medication with him again, and he wishes a trial.  The patient will discontinue Latuda and begin Geodon 40 mg b.i.d., breakfast and dinner.  The patient has no mood or other complaints and is fully appropriate on the phone, pleasant, polite, and appreciative.

## 2023-03-13 ENCOUNTER — PATIENT MESSAGE (OUTPATIENT)
Dept: PSYCHIATRY | Facility: CLINIC | Age: 23
End: 2023-03-13
Payer: OTHER GOVERNMENT

## 2023-03-13 DIAGNOSIS — F90.9 ATTENTION DEFICIT DISORDER WITH HYPERACTIVITY: ICD-10-CM

## 2023-03-13 RX ORDER — DEXTROAMPHETAMINE SACCHARATE, AMPHETAMINE ASPARTATE MONOHYDRATE, DEXTROAMPHETAMINE SULFATE AND AMPHETAMINE SULFATE 5; 5; 5; 5 MG/1; MG/1; MG/1; MG/1
20 CAPSULE, EXTENDED RELEASE ORAL EVERY MORNING
Qty: 30 CAPSULE | Refills: 0 | Status: SHIPPED | OUTPATIENT
Start: 2023-03-13 | End: 2023-04-04 | Stop reason: SDUPTHER

## 2023-03-13 RX ORDER — DEXTROAMPHETAMINE SACCHARATE, AMPHETAMINE ASPARTATE, DEXTROAMPHETAMINE SULFATE AND AMPHETAMINE SULFATE 2.5; 2.5; 2.5; 2.5 MG/1; MG/1; MG/1; MG/1
10 TABLET ORAL DAILY
Qty: 30 TABLET | Refills: 0 | OUTPATIENT
Start: 2023-03-13 | End: 2023-04-12

## 2023-03-13 NOTE — TELEPHONE ENCOUNTER
Request to send Adderall IR 10 mg to the [facility] is denied, because [facility] is feeling that prescription now.  I canceled Adderall XR 20 mg with [facility], as they do not have it in stock, with the cancellation confirmed by the pharmacist.  XR 20 mg is being approved for the [facility].

## 2023-04-04 ENCOUNTER — LAB VISIT (OUTPATIENT)
Dept: LAB | Facility: HOSPITAL | Age: 23
End: 2023-04-04
Attending: PSYCHIATRY & NEUROLOGY
Payer: OTHER GOVERNMENT

## 2023-04-04 ENCOUNTER — OFFICE VISIT (OUTPATIENT)
Dept: PSYCHIATRY | Facility: CLINIC | Age: 23
End: 2023-04-04
Payer: OTHER GOVERNMENT

## 2023-04-04 DIAGNOSIS — F31.62 BIPOLAR 1 DISORDER, MIXED, MODERATE: ICD-10-CM

## 2023-04-04 DIAGNOSIS — F41.9 ANXIETY DISORDER, UNSPECIFIED TYPE: ICD-10-CM

## 2023-04-04 DIAGNOSIS — F31.9 BIPOLAR AFFECTIVE DISORDER, REMISSION STATUS UNSPECIFIED: Primary | ICD-10-CM

## 2023-04-04 DIAGNOSIS — R74.8 ELEVATED LIVER ENZYMES: ICD-10-CM

## 2023-04-04 DIAGNOSIS — F90.9 ATTENTION DEFICIT DISORDER WITH HYPERACTIVITY: ICD-10-CM

## 2023-04-04 DIAGNOSIS — F84.0 AUTISM DISORDER: ICD-10-CM

## 2023-04-04 PROCEDURE — 99999 PR PBB SHADOW E&M-EST. PATIENT-LVL I: CPT | Mod: PBBFAC,,, | Performed by: PSYCHIATRY & NEUROLOGY

## 2023-04-04 PROCEDURE — 99999 PR PBB SHADOW E&M-EST. PATIENT-LVL I: ICD-10-PCS | Mod: PBBFAC,,, | Performed by: PSYCHIATRY & NEUROLOGY

## 2023-04-04 PROCEDURE — 99214 OFFICE O/P EST MOD 30 MIN: CPT | Mod: S$PBB,,, | Performed by: PSYCHIATRY & NEUROLOGY

## 2023-04-04 PROCEDURE — 36415 COLL VENOUS BLD VENIPUNCTURE: CPT | Performed by: PSYCHIATRY & NEUROLOGY

## 2023-04-04 PROCEDURE — 80076 HEPATIC FUNCTION PANEL: CPT | Performed by: PSYCHIATRY & NEUROLOGY

## 2023-04-04 PROCEDURE — 99214 PR OFFICE/OUTPT VISIT, EST, LEVL IV, 30-39 MIN: ICD-10-PCS | Mod: S$PBB,,, | Performed by: PSYCHIATRY & NEUROLOGY

## 2023-04-04 PROCEDURE — 99211 OFF/OP EST MAY X REQ PHY/QHP: CPT | Mod: PBBFAC | Performed by: PSYCHIATRY & NEUROLOGY

## 2023-04-04 RX ORDER — DEXTROAMPHETAMINE SACCHARATE, AMPHETAMINE ASPARTATE MONOHYDRATE, DEXTROAMPHETAMINE SULFATE AND AMPHETAMINE SULFATE 5; 5; 5; 5 MG/1; MG/1; MG/1; MG/1
20 CAPSULE, EXTENDED RELEASE ORAL EVERY MORNING
Qty: 30 CAPSULE | Refills: 0 | Status: SHIPPED | OUTPATIENT
Start: 2023-04-04 | End: 2023-06-10

## 2023-04-04 RX ORDER — DEXTROAMPHETAMINE SACCHARATE, AMPHETAMINE ASPARTATE, DEXTROAMPHETAMINE SULFATE AND AMPHETAMINE SULFATE 2.5; 2.5; 2.5; 2.5 MG/1; MG/1; MG/1; MG/1
10 TABLET ORAL DAILY
Qty: 30 TABLET | Refills: 0 | Status: SHIPPED | OUTPATIENT
Start: 2023-04-04 | End: 2023-06-12 | Stop reason: SDUPTHER

## 2023-04-04 RX ORDER — DEXTROAMPHETAMINE SACCHARATE, AMPHETAMINE ASPARTATE, DEXTROAMPHETAMINE SULFATE AND AMPHETAMINE SULFATE 2.5; 2.5; 2.5; 2.5 MG/1; MG/1; MG/1; MG/1
10 TABLET ORAL DAILY
Qty: 30 TABLET | Refills: 0 | Status: SHIPPED | OUTPATIENT
Start: 2023-04-04 | End: 2023-05-04

## 2023-04-04 RX ORDER — ZIPRASIDONE HYDROCHLORIDE 40 MG/1
40 CAPSULE ORAL 2 TIMES DAILY
Qty: 60 CAPSULE | Refills: 1 | Status: SHIPPED | OUTPATIENT
Start: 2023-04-04 | End: 2023-06-12 | Stop reason: SDUPTHER

## 2023-04-04 RX ORDER — FLUOXETINE HYDROCHLORIDE 20 MG/1
60 CAPSULE ORAL DAILY
Qty: 270 CAPSULE | Refills: 0 | Status: SHIPPED | OUTPATIENT
Start: 2023-04-04 | End: 2023-08-14 | Stop reason: SDUPTHER

## 2023-04-04 RX ORDER — DEXTROAMPHETAMINE SACCHARATE, AMPHETAMINE ASPARTATE MONOHYDRATE, DEXTROAMPHETAMINE SULFATE AND AMPHETAMINE SULFATE 5; 5; 5; 5 MG/1; MG/1; MG/1; MG/1
20 CAPSULE, EXTENDED RELEASE ORAL EVERY MORNING
Qty: 30 CAPSULE | Refills: 0 | Status: SHIPPED | OUTPATIENT
Start: 2023-04-04 | End: 2023-06-12 | Stop reason: SDUPTHER

## 2023-04-04 NOTE — PROGRESS NOTES
"    Herminio Salcedonett   2000 04/04/2023        CURRENT PRESENTATION:   The patient presents for follow-up of bipolar disorder, anxiety, ADHD, and autism.  At his last visit, the patient has already discontinued Saphris in light of weight gain and elevated liver enzymes, and after an acceptable EKG, Geodon 40 mg twice daily was started; the patient continued on Prozac 60 mg daily and Adderall XR 20 mg daily and IR 10 mg in the afternoon.     indicates that 30 capsules of Adderall XR 20 mg were filled on March 14 and 30 tablets of Adderall IR 10 mg were filled on March 13.    In the current session, the patient reports with appropriately upbeat tone and affect, I'm doing good."  His mother presents with him at his request and with his consent, and she concurs that he has been stably doing well; she reports that he has been consistently happy" but not excessively so.  The patient describes euthymia with no depression, dora, or hypomania.  No psychosis or thoughts of harm to self or others.  Anxiety is controlled.  The patient is very pleased with his response to medications and has no side effects, including no side effects with the new Geodon.    The patient reports that he generally goes to bed around 11:00 p.m. or midnight and gets a full night of sleep.  He describes appropriate appetite.  He says that he has been walking twice a day.  Positives with his parents, his 16-year-old sister, and his maternal aunt who lives next door (in previous sessions he has indicated also being close to his grandparents).  No problems with spending.  The patient reports that he enjoyed going to a party with friends and says that this is the 1st time he has felt like socializing in this way in awhile.  He admits that he drank about 8 beers and felt hung over the next day; he volunteers his plan to moderate the amount of alcohol when he uses it.  No other substances    The patient continues to see Dr. Leora Flowers " for behavioral therapy and is pleased with that treatment.    Interim history:  Living situation/supports:  As above  Medical issues:  No change  Nonpsychotropic Medications:  No change   Allergies:  No change  Review of patient's allergies indicates:   Allergen Reactions    Abilify [aripiprazole] Other (See Comments)     Some impulsive buying (Xbox: Dec 2022) and some wt gain     Alcohol use:  Use on 1 occasion, as above  Other substance use:  None    Mental Status Exam:   The patient is subjectively and objectively doing well.  Appearance:  Appropriately groomed  Orientation:  X4  Attitude:  Cooperative, engaged   Eye Contact:  Appropriate  Behavior:  Calm, appropriate  Speech:     Rate - WNL    Volume - WNL    Quantity - WNL    Tone - relaxed, appropriate  Pressure - no  Thought Processes:  Goal-directed  Mood:  Euthymic   Affect:  Without distress, euthymic, including ability to brighten at appropriate times  SI:  No, and no thoughts of self-harm  HI:  No, and no thoughts of harm towards others  Paranoia:  No  Delusions:  No  Hallucinations:  No  Attention:  Intact over the course of the session  Cognition:  No deficits noted over the course of the session  Insight:  Intact   Judgment:  Intact  Impulse Control:  Intact         ASSESSMENT:   Encounter Diagnoses   Name Primary?    Bipolar affective disorder, remission status unspecified Yes    Bipolar 1 disorder, mixed, moderate     Attention deficit disorder with hyperactivity     Anxiety disorder, unspecified type     Autism disorder ( formerly Asperger)     Elevated liver enzymes          PLAN:     Follow up in 2 months.  The patient was directed to the  desk to schedule this appointment.  Otherwise, the patient is to call [facility] at [phone] (or go to [facility]  My Chart jamaal) and  arrange the next psychiatry appointment.     Psychiatry Medication:  Continue current medications.    Labs:  Hepatic function panel    Reviewed with  patient:  Report side effects or any other problems to the psychiatrist during clinic business hours.  Call 911 or go to an emergency department for any acute or urgent issues otherwise.  Follow up with primary care/MD specialist for continued monitoring of general health and wellness and any medical conditions.  Call  [facility] at [phone] or go to [facility] My Chart if necessary for scheduling or rescheduling.  Understanding was expressed; and no further concerns or questions were raised at this time.     [id number]  2 or more stable chronic illnesses and Prescription drug management      Large portions of this note were completed by way of voice recognition dictation software, and transcription errors are possible, such that specific information in the note should be considered in the context of the entire report.

## 2023-04-05 ENCOUNTER — PATIENT MESSAGE (OUTPATIENT)
Dept: PSYCHIATRY | Facility: CLINIC | Age: 23
End: 2023-04-05
Payer: OTHER GOVERNMENT

## 2023-04-05 LAB
ALBUMIN SERPL BCP-MCNC: 4 G/DL (ref 3.5–5.2)
ALP SERPL-CCNC: 111 U/L (ref 55–135)
ALT SERPL W/O P-5'-P-CCNC: 104 U/L (ref 10–44)
AST SERPL-CCNC: 62 U/L (ref 10–40)
BILIRUB DIRECT SERPL-MCNC: 0.1 MG/DL (ref 0.1–0.3)
BILIRUB SERPL-MCNC: 0.2 MG/DL (ref 0.1–1)
PROT SERPL-MCNC: 6.6 G/DL (ref 6–8.4)

## 2023-04-05 NOTE — TELEPHONE ENCOUNTER
Hepatic function panel shows AST and ALT with no further increase but unchanged.  The patient has been advised to consult with his primary care physician regarding any recommended evaluation and management.

## 2023-04-26 ENCOUNTER — PATIENT MESSAGE (OUTPATIENT)
Dept: INTERNAL MEDICINE | Facility: CLINIC | Age: 23
End: 2023-04-26
Payer: OTHER GOVERNMENT

## 2023-06-05 ENCOUNTER — HOSPITAL ENCOUNTER (OUTPATIENT)
Dept: RADIOLOGY | Facility: HOSPITAL | Age: 23
Discharge: HOME | End: 2023-06-05
Attending: INTERNAL MEDICINE
Payer: OTHER GOVERNMENT

## 2023-06-05 ENCOUNTER — OFFICE VISIT (OUTPATIENT)
Dept: INTERNAL MEDICINE | Facility: CLINIC | Age: 23
End: 2023-06-05
Payer: OTHER GOVERNMENT

## 2023-06-05 VITALS
WEIGHT: 315 LBS | DIASTOLIC BLOOD PRESSURE: 80 MMHG | RESPIRATION RATE: 18 BRPM | HEIGHT: 75 IN | SYSTOLIC BLOOD PRESSURE: 118 MMHG | OXYGEN SATURATION: 99 % | TEMPERATURE: 98 F | HEART RATE: 105 BPM | BODY MASS INDEX: 39.17 KG/M2

## 2023-06-05 DIAGNOSIS — R74.8 ABNORMAL TRANSAMINASES: ICD-10-CM

## 2023-06-05 DIAGNOSIS — F90.9 ATTENTION DEFICIT DISORDER WITH HYPERACTIVITY: ICD-10-CM

## 2023-06-05 DIAGNOSIS — F31.62 BIPOLAR 1 DISORDER, MIXED, MODERATE: ICD-10-CM

## 2023-06-05 DIAGNOSIS — Z00.00 ROUTINE GENERAL MEDICAL EXAMINATION AT HEALTH CARE FACILITY: Primary | ICD-10-CM

## 2023-06-05 PROCEDURE — 99395 PR PREVENTIVE VISIT,EST,18-39: ICD-10-PCS | Mod: S$PBB,,, | Performed by: INTERNAL MEDICINE

## 2023-06-05 PROCEDURE — 99999 PR PBB SHADOW E&M-EST. PATIENT-LVL V: ICD-10-PCS | Mod: PBBFAC,,, | Performed by: INTERNAL MEDICINE

## 2023-06-05 PROCEDURE — 76705 US ABDOMEN LIMITED: ICD-10-PCS | Mod: 26,,, | Performed by: STUDENT IN AN ORGANIZED HEALTH CARE EDUCATION/TRAINING PROGRAM

## 2023-06-05 PROCEDURE — 76705 ECHO EXAM OF ABDOMEN: CPT | Mod: 26,,, | Performed by: STUDENT IN AN ORGANIZED HEALTH CARE EDUCATION/TRAINING PROGRAM

## 2023-06-05 PROCEDURE — 76705 ECHO EXAM OF ABDOMEN: CPT | Mod: TC,PO

## 2023-06-05 PROCEDURE — 99395 PREV VISIT EST AGE 18-39: CPT | Mod: S$PBB,,, | Performed by: INTERNAL MEDICINE

## 2023-06-05 PROCEDURE — 99999 PR PBB SHADOW E&M-EST. PATIENT-LVL V: CPT | Mod: PBBFAC,,, | Performed by: INTERNAL MEDICINE

## 2023-06-05 PROCEDURE — 99215 OFFICE O/P EST HI 40 MIN: CPT | Mod: PBBFAC,PO | Performed by: INTERNAL MEDICINE

## 2023-06-05 NOTE — PROGRESS NOTES
Subjective:       Patient ID: Herminio Turner is a 22 y.o. male.    Chief Complaint: Annual Exam      HPI:  Patient is a 22-year-old male presenting today for updated physical exam review of chronic health issues.  Patient has history of ADD, bipolar, obesity.  He continues to follow with his psychiatrist for his ADD and bipolar.  He is stable on his medications.  It was noted that he had some slight elevation of his transaminases on his last lab work.  Repeat labs by the psychiatrist showed persistent elevation but improvement.  We discussed the possibility of him having some level of fatty liver and doing some workup for that and he expressed understanding.      He states he has been doing well overall.  He is trying to work on his weight.  He is trying to walk at least a mi a day and he is trying to be little bit more active.  He does pretty sedentary at this time.  He is not working we are going to school so he spends lot of time usama.  We talked about healthy snacks rather than unhealthy foods as well as trying to work on continued to get more movement in his day.    Review of Systems   Constitutional:  Negative for fever and unexpected weight change.   HENT:  Negative for hearing loss, postnasal drip and rhinorrhea.    Eyes:  Negative for pain and visual disturbance.   Respiratory:  Negative for cough, shortness of breath and wheezing.    Cardiovascular:  Negative for chest pain and palpitations.   Gastrointestinal:  Negative for constipation, diarrhea, nausea and vomiting.   Genitourinary:  Negative for dysuria and hematuria.   Musculoskeletal:  Negative for arthralgias, back pain, myalgias and neck stiffness.   Skin:  Negative for pallor and rash.   Neurological:  Negative for seizures, syncope and headaches.   Hematological:  Negative for adenopathy.   Psychiatric/Behavioral:  Negative for dysphoric mood. The patient is not nervous/anxious.      Objective:   /80 (BP Location: Right arm, Patient  "Position: Sitting, BP Method: Large (Manual))   Pulse 105   Temp 97.9 °F (36.6 °C) (Tympanic)   Resp 18   Ht 6' 3" (1.905 m)   Wt (!) 161.6 kg (356 lb 4.2 oz)   SpO2 99%   BMI 44.53 kg/m²      Physical Exam  Vitals reviewed.   Constitutional:       General: He is not in acute distress.     Appearance: He is well-developed.   HENT:      Head: Normocephalic and atraumatic.      Right Ear: Tympanic membrane and ear canal normal.      Left Ear: Tympanic membrane and ear canal normal.   Eyes:      Pupils: Pupils are equal, round, and reactive to light.   Neck:      Thyroid: No thyromegaly.      Vascular: No JVD.   Cardiovascular:      Rate and Rhythm: Normal rate and regular rhythm.      Heart sounds: Normal heart sounds. No murmur heard.    No friction rub. No gallop.   Pulmonary:      Effort: Pulmonary effort is normal.      Breath sounds: Normal breath sounds. No wheezing or rales.   Abdominal:      General: Bowel sounds are normal. There is no distension.      Palpations: Abdomen is soft.      Tenderness: There is no abdominal tenderness. There is no guarding or rebound.   Musculoskeletal:         General: Normal range of motion.      Cervical back: Normal range of motion and neck supple.   Lymphadenopathy:      Cervical: No cervical adenopathy.   Skin:     General: Skin is warm and dry.      Findings: No rash.   Neurological:      General: No focal deficit present.      Mental Status: He is alert and oriented to person, place, and time.      Cranial Nerves: No cranial nerve deficit.      Deep Tendon Reflexes: Reflexes are normal and symmetric.   Psychiatric:         Mood and Affect: Mood normal.         Judgment: Judgment normal.       No visits with results within 2 Week(s) from this visit.   Latest known visit with results is:   Lab Visit on 04/04/2023   Component Date Value    Total Protein 04/04/2023 6.6     Albumin 04/04/2023 4.0     Total Bilirubin 04/04/2023 0.2     Bilirubin, Direct 04/04/2023 0.1     " AST 04/04/2023 62 (H)     ALT 04/04/2023 104 (H)     Alkaline Phosphatase 04/04/2023 111        Assessment:       1. Routine general medical examination at health care facility    2. ADD (attention deficit disorder with hyperactivity)    3. Bipolar 1 disorder, mixed, moderate    4. Abnormal transaminases    5. BMI 40.0-44.9, adult        Plan:   No problem-specific Assessment & Plan notes found for this encounter.    Routine general medical examination at health care facility  Comments:  Focus on good health habits, low fat diet, regular exercise, seatbelt use, sunscreen use    ADD (attention deficit disorder with hyperactivity)  Comments:  Doing well on meds, following with Dr. Beltran    Bipolar 1 disorder, mixed, moderate  Comments:  stable over time. continue following with Dr. Beltran    Abnormal transaminases  Comments:  will check ultrasound for fatty liver.    Orders:  -     US Abdomen Limited; Future; Expected date: 06/05/2023  -     HEPATITIS PANEL, ACUTE; Future; Expected date: 06/05/2023    BMI 40.0-44.9, adult  Comments:  Continue working on exercise, focus on healthy snacks and limiting calories          Follow up in about 1 year (around 6/5/2024).

## 2023-06-05 NOTE — PATIENT INSTRUCTIONS
"Patient Education       Weight Loss Diet   About this topic   There are many "trendy" weight loss diets that are popular today. Many of these diets can end up being more harmful than helpful. The healthiest way to lose weight is to burn more calories than you eat.  A weight loss diet should help you have a healthy view of eating. It is NOT healthy to stop eating to try and lose weight. A good diet plan will help you cut down your food intake and make healthy choices.  A healthy weight loss goal is 1 to 2 pounds (0.5 to 1 kg) per week. Reducing calories in your diet, burning calories through exercise, or both can help you lose weight. Combining a healthy diet with regular physical activity can help you get the best results.  To cut calories in your diet you can:  Switch from whole milk to 1% or skim milk.  Switch from regular cheese to low-fat or fat-free cheese.  Use healthier condiment choices:  Fat-free or low-fat sour cream or salad dressings  Spray butter  Diet syrups or jellies over regular  Try frozen yogurt as a dessert rather than eating ice cream.  Skip the chips. Snack on carrots, vegetables, or fruit. If chips are a favorite of yours, try the baked style and watch portion size.  Eat grilled, roasted, boiled, broiled, or baked meats. Avoid deep-frying. Choose skinless poultry, lean red meat, lean cuts of pork, and fish for good protein sources.  Try flavored no-calorie browning. Do not drink soda and juices that have many calories.  Choose fruit instead of sweets.  General   Eating smaller meals more often may be helpful. This will keep you from overeating at your next meal. Also, eating meals slowly helps you feel full faster.  If eating 3 meals is a part of your lifestyle, choose more lean proteins and higher fiber foods to fill you up at each meal.  Do not skip meals. Most often if you skip a meal, you eat too much at the next meal.  Eat smaller portions. Use a smaller plate or bowl for meals, and when " you are eating out, eat half and take the rest home.  Plan ahead. Plan your meals and grocery list before going to the store. Planning will keep you from getting meals from restaurants.  Do not go to the grocery store hungry. You are more likely to buy snacks that are not good for you.  Portion out snacks. When you are having a snack, instead of grabbing the whole bag, portion a small amount out to give yourself a stopping point.  Drink water before and after your meals to help fill you up without the calories.  When eating starchy foods, choose whole-grain products. These have a lot of fiber which will make you feel full. Fiber also helps lower cholesterol and helps with bowel function.  If you need a helpful start, ask your doctor to send you to a dietitian for weight loss help.         What will the results be?   Losing excess weight will make your whole body healthier. You will have more energy for your daily activities and lower your risk for health problems.  What lifestyle changes are needed?   Stay active. Eating healthy is not always enough to lose weight. Burning calories by exercising is a big part of weight loss.  What foods are good to eat?   The key is to watch your portion sizes. It is best to choose foods that are lower in fat and calories.  Choose lean meats:  Boneless, skinless chicken breast  Pork loin  90% lean beef  Lean turkey meat  Fresh fish (not fried)  Choose low-fat dairy products:  1% or skim milk  Spray butter or margarine  Low-fat or fat-free cheese  Frozen yogurt or low-calorie ice cream  Choose fresh fruits, vegetables, beans and lentils, and whole wheat products more often.  Choose water to drink more often. Drink diet or no-calorie beverages when you want something other than water. Aim to get your calories from the foods you eat.  Choose smart snacks:  Fruits  Vegetables  Low-fat or nonfat yogurt  Low-fat or no-fat cheese, such as cottage cheese  Unsalted nuts  Hard-boiled  egg  Hummus  Guacamole  Natural peanut butter  Popcorn with no butter ? use pepper, garlic, or another spice to taste  Whole grain crackers  What foods should be limited or avoided?   Limit high-fat, high-sodium, and high-calorie foods like:  Fried foods  Processed meats  Whole-fat dairy products  Candy, cookies, chips, pastries  Sausage, butts, any full-fat meats  Soda, juice  Beer, wine, and mixed drinks (alcohol)  Will there be any other care needed?   What do I do first before trying to lose weight?  Talk to your doctor and dietitian to see if you need to lose weight. Work with them to set your weight loss goals.  If you have a chronic illness, such as high blood sugar or high blood pressure, ask a doctor or dietitian what diet and exercise is right for you.  Ask your doctor about how much you are able to exercise and what type of exercise is good for you.  Helpful tips   Keep a food journal to help keep you on track.  Join a support group.  Tips for burning calories:  If your workplace is near your house, choose to walk or bike to work instead of driving.  Take 20-minute walks each day. Walk around during your lunch break. You will not only burn calories, but will raise your energy for the rest of the day.  Take the stairs over the elevator.  Join a gym or exercise class with a friend.  Try to exercise 30 minutes a day for overall health. Three 10-minute sessions work too. Aim for 60 to 90 minutes a day to lose weight.  Drink lots of water before, during, and after exercise.  Where can I learn more?   Academy of Nutrition and Dietetics  https://www.eatright.org/health/weight-loss/your-health-and-your-weight/back-to-basics-for-healthy-weight-loss   Centers for Disease Control and Prevention  https://www.cdc.gov/healthyweight/healthy_eating/index.html   Familydoctor.org  https://familydoctor.org/nutrition-weight-loss-need-know-fad-diets/    NHS  https://www.nhs.uk/live-well/healthy-weight/12-tips-to-help-you-lose-weight/?tabname=you-and-your-weight   Last Reviewed Date   2021-06-24  Consumer Information Use and Disclaimer   This information is not specific medical advice and does not replace information you receive from your health care provider. This is only a brief summary of general information. It does NOT include all information about conditions, illnesses, injuries, tests, procedures, treatments, therapies, discharge instructions or life-style choices that may apply to you. You must talk with your health care provider for complete information about your health and treatment options. This information should not be used to decide whether or not to accept your health care providers advice, instructions or recommendations. Only your health care provider has the knowledge and training to provide advice that is right for you.  Copyright   Copyright © 2021 [employer], [employer]. and its affiliates and/or licensors. All rights reserved.

## 2023-06-06 ENCOUNTER — TELEPHONE (OUTPATIENT)
Dept: INTERNAL MEDICINE | Facility: CLINIC | Age: 23
End: 2023-06-06
Payer: OTHER GOVERNMENT

## 2023-06-06 DIAGNOSIS — E66.01 CLASS 3 SEVERE OBESITY DUE TO EXCESS CALORIES WITH BODY MASS INDEX (BMI) OF 40.0 TO 44.9 IN ADULT, UNSPECIFIED WHETHER SERIOUS COMORBIDITY PRESENT: ICD-10-CM

## 2023-06-06 DIAGNOSIS — K76.0 NAFLD (NONALCOHOLIC FATTY LIVER DISEASE): Primary | ICD-10-CM

## 2023-06-07 ENCOUNTER — PATIENT MESSAGE (OUTPATIENT)
Dept: PRIMARY CARE CLINIC | Facility: CLINIC | Age: 23
End: 2023-06-07
Payer: OTHER GOVERNMENT

## 2023-06-07 ENCOUNTER — TELEPHONE (OUTPATIENT)
Dept: PRIMARY CARE CLINIC | Facility: CLINIC | Age: 23
End: 2023-06-07
Payer: OTHER GOVERNMENT

## 2023-06-07 ENCOUNTER — LAB VISIT (OUTPATIENT)
Dept: LAB | Facility: HOSPITAL | Age: 23
End: 2023-06-07
Attending: INTERNAL MEDICINE
Payer: OTHER GOVERNMENT

## 2023-06-07 DIAGNOSIS — R74.8 ABNORMAL TRANSAMINASES: ICD-10-CM

## 2023-06-07 PROCEDURE — 80074 ACUTE HEPATITIS PANEL: CPT | Performed by: INTERNAL MEDICINE

## 2023-06-07 PROCEDURE — 36415 COLL VENOUS BLD VENIPUNCTURE: CPT | Mod: PO | Performed by: INTERNAL MEDICINE

## 2023-06-09 ENCOUNTER — TELEPHONE (OUTPATIENT)
Dept: PRIMARY CARE CLINIC | Facility: CLINIC | Age: 23
End: 2023-06-09
Payer: OTHER GOVERNMENT

## 2023-06-09 LAB
HAV IGM SERPL QL IA: NORMAL
HBV CORE IGM SERPL QL IA: NORMAL
HBV SURFACE AG SERPL QL IA: NORMAL
HCV AB SERPL QL IA: NORMAL

## 2023-06-11 NOTE — PROGRESS NOTES
"    Herminio Turner   2000 06/12/2023        CURRENT PRESENTATION:   The patient presents for follow-up of bipolar disorder, anxiety, ADHD, and autism.  At his last visit 2 months ago, the patient was continued on Geodon 40 mg twice daily, Prozac 60 mg daily, Adderall XR 20 mg daily, and Adderall IR 10 mg in the afternoon.  Following his visit with me, the patient saw his PCP, with documentation including:  "He continues to follow with his psychiatrist for his ADD and bipolar.  He is stable on his medications.  It was noted that he had some slight elevation of his transaminases on his last lab work.  Repeat labs by the psychiatrist showed persistent elevation but improvement.  We discussed the possibility of him having some level of fatty liver and doing some workup for that and he expressed understanding."  This was confirmed by ultrasound.     indicates that 30 tablets of Adderall IR 10 mg were last filled on April 20 and 30 capsules of Adderall XR 20 mg were last filled on May 11.    In the current session, the patient presents with his mother at his request and with his consent.  He reports that he has been largely euthymic with an occasional single day of depression or a single day of feeling impulsive and hyperactive," with impulsivity being wanting to buy things on the Internet related to his usama.  Never with further symptoms of depression or dora and never with psychosis, feelings of aggression, or thoughts of harm to self or others.  Focus is intact.  He reports sleeping 8-10 hours and sometimes longer; however, he says that his falling asleep time varies between 8:00 p.m. and 2:00 a.m..  He volunteers that his counselor has told him the same as my instructions, to set an honor an alarm to reset his biological clock "so that my sleep is not all over the place."    The patient denies any side effects of medications.  His weight has been stable since discontinuing Saphris, with a 40 lb " weight gain prior to that.  No subjective or objective dyskinetic movements.  He admits that he frequently forgets to take the 2nd dose of Geodon per day, reliably getting the 1st dose because he takes it with all of his morning medications.  With discussion of ways to remember to take the medication, the patient ultimately elects to take the full daily dose of 80 mg in the morning.    The patient reports that he exercises 3-4 times per week walking 2 miles.  Otherwise, he plays video games.  He is not socialized outside of the home.  He reports getting along with his parents, 16-year-old sister, and other family (historically, he has described being close to his maternal aunt next door and to his grandparents).  No alcohol or other substances, and no cravings.    Interim history:  Living situation/supports:  As above  Medical issues:  As above  Nonpsychotropic Medications:  None   Allergies:  No change  Review of patient's allergies indicates:   Allergen Reactions    Abilify [aripiprazole] Other (See Comments)     Some impulsive buying (Xbox: Dec 2022) and some wt gain     Alcohol use:  None  Other substance use:  None        Mental Status Exam:   The patient presents appropriately in the session and is currently without concerns or complaints.  Appearance:  Appropriately groomed  Orientation:  X4  Attitude:  Cooperative, engaged   Eye Contact:  Appropriate  Behavior:  Calm, appropriate  Speech:     Rate - WNL    Volume - WNL    Quantity - WNL    Tone - relaxed, appropriate  Pressure - no  Thought Processes:  Goal-directed  Mood:  Euthymic currently, occasional single days of depressed feelings or mild impulsivity and hyperactivity not accompanied by other mood symptoms   Affect:  Without distress, euthymic, including ability to brighten at appropriate times  SI:  No, and no thoughts of self-harm  HI:  No, and no thoughts of harm towards others  Paranoia:  No  Delusions:  No  Hallucinations:  No  Attention:  Intact  over the course of the session  Cognition:  No deficits noted over the course of the session  Insight:  Intact   Judgment:  Intact  Impulse Control:  Intact         ASSESSMENT:   Encounter Diagnoses   Name Primary?    Bipolar affective disorder, remission status unspecified Yes    Anxiety disorder, unspecified type     Attention deficit disorder with hyperactivity     Autism disorder ( formerly Asperger)          PLAN:     Follow up in 2 months.  The patient was directed to the  desk to schedule this appointment.  Otherwise, the patient is to call [facility] at [phone] (or go to [facility] jamaal) and  arrange the next psychiatry appointment.     Psychiatry Medication:  Continue Prozac 60 mg daily.  Change Geodon to 80 mg in the morning with breakfast and other morning medications as noted above.  Continue Adderall XR 20 mg daily but do not take the dose beyond 2:00 p.m..  Continue Adderall IR 10 mg daily in the afternoon, but do not take the dose beyond 5:00 p.m..      Reviewed with patient:  Report side effects or any other problems to the psychiatrist during clinic business hours.  Call 911 or go to an emergency department for any acute or urgent issues otherwise.  Follow up with primary care/MD specialist for continued monitoring of general health and wellness and any medical conditions.  Call  [facility] at [phone] or go to [facility] if necessary for scheduling or rescheduling.  Understanding was expressed; and no further concerns or questions were raised at this time.     [id number]  2 or more stable chronic illnesses and Prescription drug management      Large portions of this note were completed by way of voice recognition dictation software, and transcription errors are possible, such that specific information in the note should be considered in the context of the entire report.

## 2023-06-12 ENCOUNTER — OFFICE VISIT (OUTPATIENT)
Dept: PSYCHIATRY | Facility: CLINIC | Age: 23
End: 2023-06-12
Payer: OTHER GOVERNMENT

## 2023-06-12 ENCOUNTER — TELEPHONE (OUTPATIENT)
Dept: PRIMARY CARE CLINIC | Facility: CLINIC | Age: 23
End: 2023-06-12
Payer: OTHER GOVERNMENT

## 2023-06-12 VITALS — HEART RATE: 114 BPM | DIASTOLIC BLOOD PRESSURE: 72 MMHG | SYSTOLIC BLOOD PRESSURE: 105 MMHG

## 2023-06-12 DIAGNOSIS — F41.9 ANXIETY DISORDER, UNSPECIFIED TYPE: ICD-10-CM

## 2023-06-12 DIAGNOSIS — F31.9 BIPOLAR AFFECTIVE DISORDER, REMISSION STATUS UNSPECIFIED: Primary | ICD-10-CM

## 2023-06-12 DIAGNOSIS — F84.0 AUTISM DISORDER: ICD-10-CM

## 2023-06-12 DIAGNOSIS — F90.9 ATTENTION DEFICIT DISORDER WITH HYPERACTIVITY: ICD-10-CM

## 2023-06-12 PROCEDURE — 99212 OFFICE O/P EST SF 10 MIN: CPT | Mod: PBBFAC | Performed by: PSYCHIATRY & NEUROLOGY

## 2023-06-12 PROCEDURE — 99999 PR PBB SHADOW E&M-EST. PATIENT-LVL II: ICD-10-PCS | Mod: PBBFAC,,, | Performed by: PSYCHIATRY & NEUROLOGY

## 2023-06-12 PROCEDURE — 99214 OFFICE O/P EST MOD 30 MIN: CPT | Mod: S$PBB,,, | Performed by: PSYCHIATRY & NEUROLOGY

## 2023-06-12 PROCEDURE — 99999 PR PBB SHADOW E&M-EST. PATIENT-LVL II: CPT | Mod: PBBFAC,,, | Performed by: PSYCHIATRY & NEUROLOGY

## 2023-06-12 PROCEDURE — 99214 PR OFFICE/OUTPT VISIT, EST, LEVL IV, 30-39 MIN: ICD-10-PCS | Mod: S$PBB,,, | Performed by: PSYCHIATRY & NEUROLOGY

## 2023-06-12 RX ORDER — DEXTROAMPHETAMINE SACCHARATE, AMPHETAMINE ASPARTATE MONOHYDRATE, DEXTROAMPHETAMINE SULFATE AND AMPHETAMINE SULFATE 5; 5; 5; 5 MG/1; MG/1; MG/1; MG/1
20 CAPSULE, EXTENDED RELEASE ORAL EVERY MORNING
Qty: 30 CAPSULE | Refills: 0 | Status: SHIPPED | OUTPATIENT
Start: 2023-06-12 | End: 2023-07-14 | Stop reason: SDUPTHER

## 2023-06-12 RX ORDER — DEXTROAMPHETAMINE SACCHARATE, AMPHETAMINE ASPARTATE, DEXTROAMPHETAMINE SULFATE AND AMPHETAMINE SULFATE 2.5; 2.5; 2.5; 2.5 MG/1; MG/1; MG/1; MG/1
10 TABLET ORAL DAILY
Qty: 30 TABLET | Refills: 0 | Status: SHIPPED | OUTPATIENT
Start: 2023-06-12 | End: 2023-08-14

## 2023-06-12 RX ORDER — ZIPRASIDONE HYDROCHLORIDE 40 MG/1
40 CAPSULE ORAL 2 TIMES DAILY
Qty: 180 CAPSULE | Refills: 0 | Status: SHIPPED | OUTPATIENT
Start: 2023-06-12 | End: 2023-08-14 | Stop reason: SDUPTHER

## 2023-06-12 RX ORDER — DEXTROAMPHETAMINE SACCHARATE, AMPHETAMINE ASPARTATE, DEXTROAMPHETAMINE SULFATE AND AMPHETAMINE SULFATE 2.5; 2.5; 2.5; 2.5 MG/1; MG/1; MG/1; MG/1
10 TABLET ORAL DAILY
Qty: 30 TABLET | Refills: 0 | Status: SHIPPED | OUTPATIENT
Start: 2023-06-12 | End: 2023-08-14 | Stop reason: SDUPTHER

## 2023-07-03 ENCOUNTER — PATIENT MESSAGE (OUTPATIENT)
Dept: INTERNAL MEDICINE | Facility: CLINIC | Age: 23
End: 2023-07-03
Payer: OTHER GOVERNMENT

## 2023-07-07 ENCOUNTER — OFFICE VISIT (OUTPATIENT)
Dept: INTERNAL MEDICINE | Facility: CLINIC | Age: 23
End: 2023-07-07
Payer: OTHER GOVERNMENT

## 2023-07-07 ENCOUNTER — HOSPITAL ENCOUNTER (OUTPATIENT)
Dept: RADIOLOGY | Facility: HOSPITAL | Age: 23
Discharge: HOME | End: 2023-07-07
Attending: NURSE PRACTITIONER
Payer: OTHER GOVERNMENT

## 2023-07-07 VITALS
OXYGEN SATURATION: 97 % | HEART RATE: 105 BPM | HEIGHT: 74 IN | TEMPERATURE: 99 F | WEIGHT: 315 LBS | DIASTOLIC BLOOD PRESSURE: 70 MMHG | BODY MASS INDEX: 40.43 KG/M2 | SYSTOLIC BLOOD PRESSURE: 120 MMHG

## 2023-07-07 DIAGNOSIS — K76.0 NAFLD (NONALCOHOLIC FATTY LIVER DISEASE): ICD-10-CM

## 2023-07-07 DIAGNOSIS — R07.89 SENSATION OF CHEST TIGHTNESS: ICD-10-CM

## 2023-07-07 DIAGNOSIS — R07.89 SENSATION OF CHEST TIGHTNESS: Primary | ICD-10-CM

## 2023-07-07 PROBLEM — M79.671 RIGHT FOOT PAIN: Status: RESOLVED | Noted: 2021-11-17 | Resolved: 2023-07-07

## 2023-07-07 PROCEDURE — 71046 X-RAY EXAM CHEST 2 VIEWS: CPT | Mod: 26,,, | Performed by: RADIOLOGY

## 2023-07-07 PROCEDURE — 93005 ELECTROCARDIOGRAM TRACING: CPT | Mod: PBBFAC,PO | Performed by: INTERNAL MEDICINE

## 2023-07-07 PROCEDURE — 99214 OFFICE O/P EST MOD 30 MIN: CPT | Mod: PBBFAC,PO,25 | Performed by: NURSE PRACTITIONER

## 2023-07-07 PROCEDURE — 99999 PR PBB SHADOW E&M-EST. PATIENT-LVL IV: ICD-10-PCS | Mod: PBBFAC,,, | Performed by: NURSE PRACTITIONER

## 2023-07-07 PROCEDURE — 93010 ELECTROCARDIOGRAM REPORT: CPT | Mod: S$PBB,,, | Performed by: INTERNAL MEDICINE

## 2023-07-07 PROCEDURE — 71046 XR CHEST PA AND LATERAL: ICD-10-PCS | Mod: 26,,, | Performed by: RADIOLOGY

## 2023-07-07 PROCEDURE — 99214 OFFICE O/P EST MOD 30 MIN: CPT | Mod: S$PBB,,, | Performed by: NURSE PRACTITIONER

## 2023-07-07 PROCEDURE — 99214 PR OFFICE/OUTPT VISIT, EST, LEVL IV, 30-39 MIN: ICD-10-PCS | Mod: S$PBB,,, | Performed by: NURSE PRACTITIONER

## 2023-07-07 PROCEDURE — 99999 PR PBB SHADOW E&M-EST. PATIENT-LVL IV: CPT | Mod: PBBFAC,,, | Performed by: NURSE PRACTITIONER

## 2023-07-07 PROCEDURE — 71046 X-RAY EXAM CHEST 2 VIEWS: CPT | Mod: TC,FY,PO

## 2023-07-07 PROCEDURE — 93010 EKG 12-LEAD: ICD-10-PCS | Mod: S$PBB,,, | Performed by: INTERNAL MEDICINE

## 2023-07-07 NOTE — PROGRESS NOTES
"Subjective:       Patient ID: Herminio Turner is a 23 y.o. male.    Chief Complaint: Chest Pain (When exercising )    Pt presents to clinic today for some concerns over chest tightness  He reports this week while he is trying to exercise, he feels a tightness in his chest  He has had no recent illnesses, no URI symptoms   Attempts to walk about 2 miles/day  Last few months it has been hot so he has not walked as much  Weight is up about 10 lbs  Denies any shortness of breath  When he stops walking, the chest tightness stops  During childhood, does remember maybe one episode of wheezing but hasn't used an inhaler in many years   Pain not reproducible with touch   No history of cardiac issues       /70   Pulse 105   Temp 99.3 °F (37.4 °C) (Tympanic)   Ht 6' 2" (1.88 m)   Wt (!) 165 kg (363 lb 12.1 oz)   SpO2 97%   BMI 46.70 kg/m²     Review of Systems   Constitutional:  Negative for activity change, appetite change, chills, diaphoresis, fatigue, fever and unexpected weight change.   HENT: Negative.     Eyes: Negative.    Respiratory:  Positive for chest tightness. Negative for apnea, shortness of breath and stridor.    Cardiovascular:  Negative for chest pain, palpitations and leg swelling.   Gastrointestinal: Negative.    Endocrine: Negative.    Genitourinary: Negative.    Musculoskeletal:  Negative for arthralgias and myalgias.   Skin:  Negative for color change, pallor, rash and wound.   Allergic/Immunologic: Negative.    Neurological:  Negative for dizziness, facial asymmetry, light-headedness and headaches.   Hematological:  Negative for adenopathy.   Psychiatric/Behavioral:  Negative for agitation and behavioral problems.      Objective:      Physical Exam  Vitals reviewed.   Constitutional:       General: He is not in acute distress.     Appearance: Normal appearance. He is obese.   HENT:      Head: Normocephalic.      Right Ear: Tympanic membrane normal.      Left Ear: Tympanic membrane normal. "      Nose: Nose normal.   Eyes:      Conjunctiva/sclera: Conjunctivae normal.      Pupils: Pupils are equal, round, and reactive to light.   Cardiovascular:      Rate and Rhythm: Normal rate.      Pulses: Normal pulses.   Pulmonary:      Effort: Pulmonary effort is normal.   Abdominal:      General: Abdomen is flat.      Palpations: Abdomen is soft.   Musculoskeletal:         General: Normal range of motion.      Cervical back: Normal range of motion.   Skin:     General: Skin is warm.      Capillary Refill: Capillary refill takes less than 2 seconds.   Neurological:      Mental Status: He is alert and oriented to person, place, and time. Mental status is at baseline.   Psychiatric:         Mood and Affect: Mood normal.       Assessment:       1. Sensation of chest tightness    2. NAFLD (nonalcoholic fatty liver disease)    3. BMI 45.0-49.9, adult        Plan:       Herminio was seen today for chest pain.    Diagnoses and all orders for this visit:    Sensation of chest tightness  -     IN OFFICE EKG 12-LEAD (to Muse)  -     X-Ray Chest PA And Lateral; Future  - EKG NSR in clinic, will obtain chest xray  - We discussed labs, PFT should symptoms not improve. Likely due to deconditioning. Advised slowly increasing his walking by .25 miles opposed to jumping back into 2 miles/daily.     NAFLD (nonalcoholic fatty liver disease)      - Chronic, continue to avoid tylenol/alcohol     BMI 45.0-49.9, adult      Pt wishes to hold off on labs/referrals at this point  Will do EKG and chest xray  Slowly increase exercise  ED precautions discussed  Follow up for worsening or no improvement in symptoms and PRN.

## 2023-07-14 ENCOUNTER — PATIENT MESSAGE (OUTPATIENT)
Dept: PSYCHIATRY | Facility: CLINIC | Age: 23
End: 2023-07-14
Payer: OTHER GOVERNMENT

## 2023-07-14 DIAGNOSIS — F90.9 ATTENTION DEFICIT DISORDER WITH HYPERACTIVITY: ICD-10-CM

## 2023-07-14 RX ORDER — DEXTROAMPHETAMINE SACCHARATE, AMPHETAMINE ASPARTATE MONOHYDRATE, DEXTROAMPHETAMINE SULFATE AND AMPHETAMINE SULFATE 5; 5; 5; 5 MG/1; MG/1; MG/1; MG/1
20 CAPSULE, EXTENDED RELEASE ORAL EVERY MORNING
Qty: 30 CAPSULE | Refills: 0 | Status: SHIPPED | OUTPATIENT
Start: 2023-07-14 | End: 2023-08-14

## 2023-07-14 NOTE — TELEPHONE ENCOUNTER
Adderall XR 20 mg canceled at the [facility] and sent to [facility] as at the moment the [facility] has the medication.

## 2023-08-14 ENCOUNTER — OFFICE VISIT (OUTPATIENT)
Dept: PSYCHIATRY | Facility: CLINIC | Age: 23
End: 2023-08-14
Payer: OTHER GOVERNMENT

## 2023-08-14 VITALS — HEART RATE: 92 BPM | SYSTOLIC BLOOD PRESSURE: 111 MMHG | DIASTOLIC BLOOD PRESSURE: 79 MMHG

## 2023-08-14 DIAGNOSIS — F41.9 ANXIETY DISORDER, UNSPECIFIED TYPE: ICD-10-CM

## 2023-08-14 DIAGNOSIS — F84.0 AUTISM DISORDER: ICD-10-CM

## 2023-08-14 DIAGNOSIS — F31.9 BIPOLAR AFFECTIVE DISORDER, REMISSION STATUS UNSPECIFIED: Primary | ICD-10-CM

## 2023-08-14 DIAGNOSIS — F90.9 ATTENTION DEFICIT DISORDER WITH HYPERACTIVITY: ICD-10-CM

## 2023-08-14 PROCEDURE — 99214 OFFICE O/P EST MOD 30 MIN: CPT | Mod: S$PBB,,, | Performed by: PSYCHIATRY & NEUROLOGY

## 2023-08-14 PROCEDURE — 99999 PR PBB SHADOW E&M-EST. PATIENT-LVL II: ICD-10-PCS | Mod: PBBFAC,AF,HB, | Performed by: PSYCHIATRY & NEUROLOGY

## 2023-08-14 PROCEDURE — 99212 OFFICE O/P EST SF 10 MIN: CPT | Mod: PBBFAC | Performed by: PSYCHIATRY & NEUROLOGY

## 2023-08-14 PROCEDURE — 99999 PR PBB SHADOW E&M-EST. PATIENT-LVL II: CPT | Mod: PBBFAC,AF,HB, | Performed by: PSYCHIATRY & NEUROLOGY

## 2023-08-14 PROCEDURE — 99214 PR OFFICE/OUTPT VISIT, EST, LEVL IV, 30-39 MIN: ICD-10-PCS | Mod: S$PBB,,, | Performed by: PSYCHIATRY & NEUROLOGY

## 2023-08-14 RX ORDER — FLUOXETINE HYDROCHLORIDE 20 MG/1
60 CAPSULE ORAL DAILY
Qty: 270 CAPSULE | Refills: 0 | Status: SHIPPED | OUTPATIENT
Start: 2023-08-14 | End: 2023-10-23 | Stop reason: SDUPTHER

## 2023-08-14 RX ORDER — DEXTROAMPHETAMINE SACCHARATE, AMPHETAMINE ASPARTATE MONOHYDRATE, DEXTROAMPHETAMINE SULFATE AND AMPHETAMINE SULFATE 5; 5; 5; 5 MG/1; MG/1; MG/1; MG/1
20 CAPSULE, EXTENDED RELEASE ORAL EVERY MORNING
Qty: 30 CAPSULE | Refills: 0 | Status: SHIPPED | OUTPATIENT
Start: 2023-08-14 | End: 2023-09-26 | Stop reason: SDUPTHER

## 2023-08-14 RX ORDER — ZIPRASIDONE HYDROCHLORIDE 80 MG/1
80 CAPSULE ORAL DAILY
Qty: 90 CAPSULE | Refills: 0 | Status: SHIPPED | OUTPATIENT
Start: 2023-08-14 | End: 2023-10-23 | Stop reason: SDUPTHER

## 2023-08-14 RX ORDER — DEXTROAMPHETAMINE SACCHARATE, AMPHETAMINE ASPARTATE, DEXTROAMPHETAMINE SULFATE AND AMPHETAMINE SULFATE 2.5; 2.5; 2.5; 2.5 MG/1; MG/1; MG/1; MG/1
10 TABLET ORAL DAILY
Qty: 30 TABLET | Refills: 0 | Status: SHIPPED | OUTPATIENT
Start: 2023-08-14 | End: 2023-12-21

## 2023-08-14 NOTE — PROGRESS NOTES
Herminio Salcedonett   2000 08/14/2023        CURRENT PRESENTATION:   The patient presents for follow-up of bipolar disorder, anxiety, ADHD, and autism.  When he was last seen 2 months ago, the medication plan was: Continue Prozac 60 mg daily.  Change Geodon to 80 mg in the morning with breakfast and other morning medications as noted above.  Continue Adderall XR 20 mg daily but do not take the dose beyond 2:00 p.m..  Continue Adderall IR 10 mg daily in the afternoon, but do not take the dose beyond 5:00 p.m..    Following his visit with me, the patient was seen by internal medicine for a complaint of chest tightness, felt to be due to deconditioning.  July 7 EKG shows:  Vent. Rate : 098 BPM     Atrial Rate : 098 BPM      P-R Int : 160 ms          QRS Dur : 090 ms       QT Int : 348 ms       P-R-T Axes : 021 005 037 degrees      QTc Int : 444 ms   Normal sinus rhythm   Normal ECG   When compared with ECG of 07-JUL-2023 13:48,   No significant change was found   Confirmed by Katarina BARBER, Eric CANTU ([id number]) on 7/19/2023 1:32:08 PM      indicates that 30 capsules of Adderall XR 20 mg were last filled on July 14 and that 30 tablets of Adderall IR 10 mg were last filled on June 12.    In the current session, the patient was seen initially alone and then later with his father, with his consent.    The patient reports that he has been doing well and has no complaints, concerns, or questions.  Euthymic with no depression, excessively elevated moods, irritable moods, or manic signs or symptoms.  Anxiety is well controlled.  No psychosis.  No thoughts of harm to self or others or feelings of aggression.  Focus intact.  Sleeping well, but he continues to very his time to go to sleep.    The patient reports that he continues to enjoy games on the computer.  He says that it has been too hot to walk.  He reports getting along well with his parents and his 16-year-old sister, as well as next door and his grandparents.  He  "says that he has helped his grandfather with computer problems and feels good about this.    He reports that after he turned 23 years old, he bought 3/5 of liquor and consumed them over the next couple of weeks.  He feels that he got it out of my system" and has no intention to drink on any regular basis or any more than an occasional single drink.  He reports that he helped his mother and grandmother with something about 3 weeks ago and his mother bought him a 12 pack of beer that lasted 2 weeks.  He says that he does not think about alcohol, does not crave it, and does not intend to purchase alcohol further.    His father concurs with all of the above.  He says that the patient is happy but does not appear to have any drive, but if he is happy, we are happy."  He does not feel that there needs to be any medication change.  We talked about the potential for an SSRI to decreased motivation, but he says that he sees no flattening, in that the patient seems very interested in usama and clearly enjoys it as he does so.  The patient and his father interacted appropriately and lovingly.      Interim history:  Living situation/supports:  As above  Medical issues:  Non alcoholic fatty liver disease  Nonpsychotropic Medications:  None   Allergies:   Review of patient's allergies indicates:   Allergen Reactions    Abilify [aripiprazole] Other (See Comments)     Some impulsive buying (Xbox: Dec 2022) and some wt gain     Alcohol use:  As above  Other substance use:  None    Mental Status Exam:   Appearance:  Appropriately groomed  Orientation:  X4  Attitude:  Cooperative, engaged   Eye Contact:  Appropriate  Behavior:  Calm, appropriate  Speech:     Rate - WNL    Volume - WNL    Quantity - WNL    Tone - relaxed, appropriate  Pressure - no  Thought Processes:  Goal-directed  Mood:  Euthymic   Affect:  Without distress, euthymic, including ability to brighten at appropriate times  SI:  No, and no thoughts of self-harm  HI:  " No, and no thoughts of harm towards others  Paranoia:  No  Delusions:  No  Hallucinations:  No  Attention:  Intact over the course of the session  Cognition:  No deficits noted over the course of the session  Insight:  Intact   Judgment:  Intact  Impulse Control:  Intact          ASSESSMENT:   Encounter Diagnoses   Name Primary?    Bipolar affective disorder, remission status unspecified Yes    Attention deficit disorder with hyperactivity     Anxiety disorder, unspecified type     Autism disorder ( formerly Asperger)          PLAN:     Follow up in 2 months.  The patient was directed to the  desk to schedule this appointment.  Otherwise, the patient is to call [facility] at [phone] (or go to [facility] jamaal) and  arrange the next psychiatry appointment.     Psychiatry Medication:  Continue current medications.    Reviewed with patient:  Report side effects or any other problems to the psychiatrist during clinic business hours.  Call [phone] or go to an emergency department for any acute or urgent issues otherwise.  Follow up with primary care/MD specialist for continued monitoring of general health and wellness and any medical conditions.  Call  [facility] at [phone] or go to [facility] if necessary for scheduling or rescheduling.  Understanding was expressed; and no further concerns or questions were raised at this time.     [address]  2 or more stable chronic illnesses and Prescription drug management        Large portions of this note were completed by way of voice recognition dictation software, and transcription errors are possible, such that specific information in the note should be considered in the context of the entire report.

## 2023-08-16 ENCOUNTER — OFFICE VISIT (OUTPATIENT)
Dept: PRIMARY CARE CLINIC | Facility: CLINIC | Age: 23
End: 2023-08-16
Payer: OTHER GOVERNMENT

## 2023-08-16 ENCOUNTER — PATIENT MESSAGE (OUTPATIENT)
Dept: PRIMARY CARE CLINIC | Facility: CLINIC | Age: 23
End: 2023-08-16

## 2023-08-16 VITALS
WEIGHT: 315 LBS | RESPIRATION RATE: 18 BRPM | BODY MASS INDEX: 40.43 KG/M2 | TEMPERATURE: 99 F | HEART RATE: 87 BPM | HEIGHT: 74 IN | SYSTOLIC BLOOD PRESSURE: 120 MMHG | OXYGEN SATURATION: 97 % | DIASTOLIC BLOOD PRESSURE: 82 MMHG

## 2023-08-16 DIAGNOSIS — E66.1 CLASS 3 DRUG-INDUCED OBESITY WITH SERIOUS COMORBIDITY AND BODY MASS INDEX (BMI) OF 45.0 TO 49.9 IN ADULT: ICD-10-CM

## 2023-08-16 DIAGNOSIS — F90.9 ATTENTION DEFICIT DISORDER WITH HYPERACTIVITY: ICD-10-CM

## 2023-08-16 DIAGNOSIS — F41.9 ANXIETY DISORDER, UNSPECIFIED TYPE: ICD-10-CM

## 2023-08-16 DIAGNOSIS — F84.0 AUTISM DISORDER: ICD-10-CM

## 2023-08-16 DIAGNOSIS — K76.0 HEPATIC STEATOSIS: ICD-10-CM

## 2023-08-16 DIAGNOSIS — R63.5 WEIGHT GAIN: ICD-10-CM

## 2023-08-16 PROCEDURE — 99999 PR PBB SHADOW E&M-EST. PATIENT-LVL IV: CPT | Mod: PBBFAC,,, | Performed by: FAMILY MEDICINE

## 2023-08-16 PROCEDURE — 99214 OFFICE O/P EST MOD 30 MIN: CPT | Mod: S$PBB,,, | Performed by: FAMILY MEDICINE

## 2023-08-16 PROCEDURE — 99999 PR PBB SHADOW E&M-EST. PATIENT-LVL IV: ICD-10-PCS | Mod: PBBFAC,,, | Performed by: FAMILY MEDICINE

## 2023-08-16 PROCEDURE — 99214 PR OFFICE/OUTPT VISIT, EST, LEVL IV, 30-39 MIN: ICD-10-PCS | Mod: S$PBB,,, | Performed by: FAMILY MEDICINE

## 2023-08-16 PROCEDURE — 99214 OFFICE O/P EST MOD 30 MIN: CPT | Mod: PBBFAC | Performed by: FAMILY MEDICINE

## 2023-08-16 RX ORDER — METFORMIN HYDROCHLORIDE 500 MG/1
TABLET, EXTENDED RELEASE ORAL
Qty: 60 TABLET | Refills: 2 | Status: SHIPPED | OUTPATIENT
Start: 2023-08-16 | End: 2023-12-22 | Stop reason: SDUPTHER

## 2023-08-16 NOTE — PROGRESS NOTES
Subjective   Labs reviewed  Pmhx, fam hx, soc hx, surg hx, allergies, med list reviewed  Referring MD: Bonita  PCP: same  BMI noted 45  Diet: variable  Exercise/Activity: light  Sleep: adequate  Stressors: none   Anxiety/Depression Screen/PHQ-2: stable mood    Has medicaid  Pt is adopted; not aware of fam hx    Labs reviewed: 3/23  Ast 65, alt 107    Narrative & Impression  EXAM: US ABDOMEN LIMITED     CLINICAL HISTORY: [R74.8]-Abnormal levels of other serum enzymes.     COMPARISON: No relevant prior studies.     TECHNIQUE:  Limited sonographic evaluation of the abdomen performed with grayscale and color and spectral Doppler.  Evaluated structures including the pancreas, gallbladder, bile ducts, IVC, liver, and spleen.     FINDINGS:  The body of the pancreas is not unusual in appearance. The remainder is not well seen due to bowel gas.     The liver is diffusely increased in echotexture, measuring 15.6 cm in length.  The portal vein shows a normal direction of flow and waveform.     The gallbladder contains no shadowing stones.  The common bile duct measures 4 mm.  No biliary ductal dilation.     The spleen is normal in size and morphology.     The visualized IVC is normal.  No ascites is evident.        Impression:     Diffusely increased hepatic echotexture, compatible with hepatic steatosis.        Report Date: 6/5/2023 3:23 PM    Patient ID: Herminio Turner is a 23 y.o. male.    Chief Complaint: Establish Care (Weight loss)    Pt referred Dr. Mesa; hx of fatty liver (elevated lfts/u/s consistent with this).   Pt has had weight gain with his psych meds. Was thought induced by meds.   Pt states has been 25-30 lb. Feels slightly more tired. Not working now. Likes computers/IT.       Food recall:  Bfast: sometimes skips; if eats: breakfast burrito or special k  Lunch: pizza or leftovers  Dinner: white beans, likes veggies  Snacks: none   Water: adequate  Sugar Sweetened beverages: pt has tried to cut back on  "sugar; changed some to zero sugars  Etoh: occasionally      HPI  Review of Systems   Constitutional:  Negative for activity change, appetite change, fatigue and unexpected weight change.   HENT:  Negative for mouth dryness.    Eyes:  Negative for visual disturbance.   Respiratory:  Negative for apnea, chest tightness and shortness of breath.    Cardiovascular:  Negative for chest pain.   Gastrointestinal:  Negative for abdominal pain.   Musculoskeletal:  Negative for arthralgias and myalgias.   Integumentary:  Negative for rash.   Allergic/Immunologic: Negative for food allergies.   Psychiatric/Behavioral:  Negative for behavioral problems and sleep disturbance. The patient is not nervous/anxious.           Objective   Waist:  59 "  Physical Exam  Vitals and nursing note reviewed.   Constitutional:       General: He is not in acute distress.  HENT:      Head: Normocephalic and atraumatic.      Mouth/Throat:      Pharynx: Oropharynx is clear.   Eyes:      General: No scleral icterus.     Pupils: Pupils are equal, round, and reactive to light.   Neck:      Comments: No TM  Cardiovascular:      Rate and Rhythm: Normal rate and regular rhythm.      Pulses: Normal pulses.      Heart sounds: Normal heart sounds. No murmur heard.     No friction rub. No gallop.   Pulmonary:      Effort: Pulmonary effort is normal. No respiratory distress.      Breath sounds: Normal breath sounds. No wheezing, rhonchi or rales.   Abdominal:      General: Bowel sounds are normal. There is no distension.      Palpations: Abdomen is soft.      Tenderness: There is no abdominal tenderness.   Musculoskeletal:         General: No swelling.      Cervical back: Normal range of motion and neck supple. No tenderness.   Lymphadenopathy:      Cervical: No cervical adenopathy.   Skin:     General: Skin is warm.      Capillary Refill: Capillary refill takes less than 2 seconds.      Findings: No erythema or rash.   Neurological:      Mental Status: He is " alert and oriented to person, place, and time.   Psychiatric:         Mood and Affect: Mood normal.         Behavior: Behavior normal.            Assessment and Plan       ICD-10-CM ICD-9-CM   1. Weight gain  R63.5 783.1   2. Hepatic steatosis  K76.0 571.8   3. Anxiety disorder, unspecified type  F41.9 300.00   4. ADD (attention deficit disorder with hyperactivity)  F90.9 314.01   5. Autism disorder ( formerly Asperger)  F84.0 299.00   6. Class 3 drug-induced obesity with serious comorbidity and body mass index (BMI) of 45.0 to 49.9 in adult  E66.1 278.01    Z68.42 V85.42        Weight gain  -     metFORMIN (GLUCOPHAGE-XR) 500 MG ER 24hr tablet; Take 1 tablet (500 mg total) by mouth once daily for 14 days, THEN 1 tablet (500 mg total) 2 (two) times daily with meals for 14 days.  Dispense: 60 tablet; Refill: 2    Hepatic steatosis  Comments:  goal 5-10% tbw  lower carb med diet meal plan dw pt  work on more fiber, protein, less sugar  reviewed with pt    Anxiety disorder, unspecified type  Comments:  chronic/stable; continue per Dr. Beltran    ADD (attention deficit disorder with hyperactivity)  Comments:  chronic/stable    Autism disorder ( formerly Asperger)  Comments:  chronic/stable    Class 3 drug-induced obesity with serious comorbidity and body mass index (BMI) of 45.0 to 49.9 in adult  Comments:  trial metformin  dw pt risks/benefits/se's of this  reviewed potential gi upset  Orders:  -     metFORMIN (GLUCOPHAGE-XR) 500 MG ER 24hr tablet; Take 1 tablet (500 mg total) by mouth once daily for 14 days, THEN 1 tablet (500 mg total) 2 (two) times daily with meals for 14 days.  Dispense: 60 tablet; Refill: 2      Has been hard to walk with weather  Has jamaal online   Goal 2x/week 20 min exercise  And < 25 grams added sugar/day  No coverage for dietician  I have asked pt to send food log       Inbody:  Smm suboptiomal  Pbf: 54  Visc fat 28  Bmr 1975

## 2023-08-16 NOTE — PATIENT INSTRUCTIONS
"Goal: < 25 grams added sugar/day     Please send me 1-2 days food log and how you are doing with the metformin.       Please make/keep your f/u appointment.   Access WearYou[employer] Online for additional drug information, tools, and databases.  Copyright 1390-1079 [employer] All rights reserved.  Contributor Disclosures  (For additional information see "Metformin: Drug information" and see "Metformin: Pediatric drug information")    You must carefully read the "Consumer Information Use and Disclaimer" below in order to understand and correctly use this information.  Brand Names: US  Fortamet [DSC];     Glumetza;     Riomet;     Riomet ER [DSC]    Brand Names: Jenni  AG-Metformin;     APO-MetFORMIN;     APO-MetFORMIN ER;     Auro-Metformin;     DOM-MetFORMIN;     Glucophage;     Glumetza;     Glycon;     JAMP-MetFORMIN;     Mar-MetFORMIN;     MetFORMIN FC;     MINT-MetFORMIN [DSC];     PMS-MetFORMIN;     PMSC-Metformin;     PRO-MetFORMIN;     PADMINI-Metformin;     RAN-MetFORMIN;     RATIO-MetFORMIN [DSC];     OCTAVIANO-MetFORMIN;     SANDOZ MetFORMIN FC;     Septa-MetFORMIN [DSC];     TEVA Metformin    Warning  Rarely, metformin may cause too much lactic acid in the blood (lactic acidosis). The risk is higher in people who have kidney problems, liver problems, heart failure, use alcohol, or take other drugs like topiramate. The risk is also higher in people who are 65 or older and in people who are having surgery, an exam or test with contrast, or other procedures. If lactic acidosis happens, it can lead to other health problems and can be deadly. Kidney tests may be done while taking this drug.  Do not take this drug if you have a very bad infection, low oxygen, or a lot of fluid loss (dehydration).  Call your doctor right away if you have signs of too much lactic acid in the blood (lactic acidosis) like confusion; fast breathing; fast or slow heartbeat; a heartbeat that does not feel normal; very bad stomach pain, upset " stomach, or throwing up; feeling very sleepy; shortness of breath; feeling very tired or weak; very bad dizziness; feeling cold; or muscle pain or cramps.    What is this drug used for?  It is used to lower blood sugar in patients with high blood sugar (diabetes).    What do I need to tell my doctor BEFORE I take this drug?  If you are allergic to this drug; any part of this drug; or any other drugs, foods, or substances. Tell your doctor about the allergy and what signs you had.  If you have any of these health problems: Acidic blood problem, kidney disease, or liver disease.  If you have had a recent heart attack or stroke.  If you are not able to eat or drink like normal, including before certain procedures or surgery.  If you are having an exam or test with contrast or have had one within the past 48 hours, talk with your doctor.  This is not a list of all drugs or health problems that interact with this drug.  Tell your doctor and pharmacist about all of your drugs (prescription or OTC, natural products, vitamins) and health problems. You must check to make sure that it is safe for you to take this drug with all of your drugs and health problems. Do not start, stop, or change the dose of any drug without checking with your doctor.    What are some things I need to know or do while I take this drug?  All products:  Tell all of your health care providers that you take this drug. This includes your doctors, nurses, pharmacists, and dentists.  Talk with your doctor before you drink alcohol.  Do not drive if your blood sugar has been low. There is a greater chance of you having a crash.  Check your blood sugar as you have been told by your doctor.  Have blood work checked as you have been told by the doctor. Talk with the doctor.  It may be harder to control blood sugar during times of stress such as fever, infection, injury, or surgery. A change in physical activity, exercise, or diet may also affect blood  sugar.  Follow the diet and workout plan that your doctor told you about.  If diarrhea happens or you are throwing up, call your doctor. You will need to drink more fluids to keep from losing too much fluid.  Be careful in hot weather or while being active. Drink lots of fluids to stop fluid loss.  Long-term treatment with metformin may lead to low vitamin B-12 levels. If you have ever had low vitamin B-12 levels, talk with your doctor.  If you are 65 or older, use this drug with care. You could have more side effects.  There is a chance of pregnancy in people of childbearing age who have not been ovulating. If you want to avoid pregnancy, use birth control while taking this drug.  Tell your doctor if you are pregnant, plan on getting pregnant, or are breast-feeding. You will need to talk about the benefits and risks to you and the baby.  Extended-release tablets:  You may see something that looks like the tablet in your stool. This is normal and not a cause for concern. If you have questions, talk with your doctor.    What are some side effects that I need to call my doctor about right away?  WARNING/CAUTION: Even though it may be rare, some people may have very bad and sometimes deadly side effects when taking a drug. Tell your doctor or get medical help right away if you have any of the following signs or symptoms that may be related to a very bad side effect:  Signs of an allergic reaction, like rash; hives; itching; red, swollen, blistered, or peeling skin with or without fever; wheezing; tightness in the chest or throat; trouble breathing, swallowing, or talking; unusual hoarseness; or swelling of the mouth, face, lips, tongue, or throat.  It is common to have stomach problems like upset stomach, throwing up, or diarrhea when you start taking this drug. If you have stomach problems later during treatment, call your doctor right away. This may be a sign of an acid health problem in the blood (lactic  acidosis).  Low blood sugar can happen. The chance may be raised when this drug is used with other drugs for diabetes. Signs may be dizziness, headache, feeling sleepy or weak, shaking, fast heartbeat, confusion, hunger, or sweating. Call your doctor right away if you have any of these signs. Follow what you have been told to do for low blood sugar. This may include taking glucose tablets, liquid glucose, or some fruit juices.    What are some other side effects of this drug?  All drugs may cause side effects. However, many people have no side effects or only have minor side effects. Call your doctor or get medical help if any of these side effects or any other side effects bother you or do not go away:  Stomach pain or heartburn.  Gas.  Diarrhea, upset stomach, or throwing up.  Feeling tired or weak.  Headache.  These are not all of the side effects that may occur. If you have questions about side effects, call your doctor. Call your doctor for medical advice about side effects.  You may report side effects to your national health agency.    How is this drug best taken?  Use this drug as ordered by your doctor. Read all information given to you. Follow all instructions closely.  All products:  Take with meals.  Keep taking this drug as you have been told by your doctor or other health care provider, even if you feel well.  Extended-release tablets:  Take with the evening meal if taking once daily.  Swallow whole. Do not chew, break, or crush.  If you have trouble swallowing, talk with your doctor.  All liquid products:  Measure liquid doses carefully. Use the measuring device that comes with this drug. If there is none, ask the pharmacist for a device to measure this drug.  Extended-release suspension:  Take with the evening meal if taking once daily.  Shake well before use.    What do I do if I miss a dose?  Skip the missed dose and go back to your normal time unless your doctor tells you to do something else.  Do  not take 2 doses at the same time or extra doses.    How do I store and/or throw out this drug?  All products:  Store at room temperature in a dry place. Do not store in a bathroom.  Keep all drugs in a safe place. Keep all drugs out of the reach of children and pets.  Throw away unused or  drugs. Do not flush down a toilet or pour down a drain unless you are told to do so. Check with your pharmacist if you have questions about the best way to throw out drugs. There may be drug take-back programs in your area.  All tablet products:  Protect from light.  Extended-release suspension:  Store in original container.  Throw away any part not used 100 days after this drug was mixed.    General drug facts  If your symptoms or health problems do not get better or if they become worse, call your doctor.  Do not share your drugs with others and do not take anyone else's drugs.  Some drugs may have another patient information leaflet. If you have any questions about this drug, please talk with your doctor, nurse, pharmacist, or other health care provider.  If you think there has been an overdose, call your poison control center or get medical care right away. Be ready to tell or show what was taken, how much, and when it happened.    Last Reviewed Date  2021            PRODUCE  [] All fresh fruit   [] All fresh vegetables   [] All fresh herbs  [] All herb purees + pastes  [] Pre-spiralized vegetable noodles   [] Steam-In-The-Bag begetables  [] Riced cauliflower  [] Jicama sticks  [] Love Beets  all varieties  [] Wholly Guacamole  all varieties  [] Hummus  all varieties, chickpea + vegetable  [] Tofu Shirataki noodles    [] Tofu  all varieties  [] Tempeh  all varieties    PROTEIN  CHICKEN   [] Boneless, skinless breasts  [] Boneless, skinless thighs  [] Ground chicken breast, at least 93% lean  [] Chicken breast cutlet  [] Aidell's  Chicken Sausage + Chicken Meatballs    TURKEY   [] Turkey breast tenderloin  "  [] Ground turkey breast, at least 93% lean  [] [place] Naturals  Turkey Sausage    BEEF  [] Tenderloin  [] Sirloin  [] Top Loin  [] Flank Steak  [] Round Steak  [] Filet  [] Lean ground beef, at least 93% lean + grass-fed preferable    PORK  [] Tenderloin  [] Pork Chop  [] Center Cut  [] Radha Naturals  No-Sugar Rodas    BISON  [] [place]wood  90 - 95% lean    SEAFOOD  [] All fresh fish + seafood; locally sourced when possible  [] Smoked salmon    HEAT + EAT ENTREES   [] Duarte's Natural Foods  Chicken, Pork, Beef  [] Mike  "All Natural" Grilled Chicken Breast + Strips, all varieties    SAUCES SPREADS + DIPS  [] Bitchin Sauce  Original, Chipotle, Cilantro Gamaliel  [] Joseph's Kitchen  Tzatziki Yogurt Dip, Babaganoush, Hummus  [] Wholly Guacamole  all varieties  [] Hummus  all varieties  [] [place] Gringo Salsa  all varieties  [] Mrs. Jenniffer's Salsa  all varieties  [] Stubb's All Natural BBQ Sauce  [] Primal Kitchen  Abdalla, Ketchup, BBQ Sauce  [] Primal Kitchen Pasta Sauce  Roasted Garlic, Tomato Basil, no-dairy Vodka Sauce  [] Sal & Susana's  HeartSmart Pasta Sauce    DAIRY/DAIRY SUBSTITUTES/EGGS  EGGS   [] All eggs  cage-free, pasture-raise preferable  [] Crepini  egg wraps  [] Vital Farms  Pasture-Raised Egg Bites  [] JUST Egg [vegan]     CREAMERS   [] Califia  Better Half, original + vanilla unsweetened  [] NutPods  all varieties    MILK   [] Horizon Organic  all varieties except chocolate  [] Organic Valley  all varieties except chocolate  [] Organic Valley  ultra-filtered, reduced fat milk     PLANT_BASED MILK ALTERNATIVES  [] All unsweetened almond milks  original, vanilla + chocolate  [] Ripple  unsweetened   [] Milkadamia  original +_ vanilla, unsweetened   [] Forager  original + vanilla, unsweetened   [] Silk Organic  soy milk, unsweetened  [] Oatly  unsweetened  [] Califia  regular + protein-fortified oat milk, unsweetened     CHEESES  [] Regular or reduced fat cheeses  [] " [facility]  Fresh Mozzarella Snack Packs, [facility] Power-full Snack   [] Goat cheese  [] Fresh mozzarella  [] String cheese  all varieties  [] Caterina Cottage Cheese  [] Farida's Cultured Cottage Cheese  [] Nena Life 'Just Like Mozzarella'  plant-based shreds and other varieties  [] Parmela Creamery  plant-based shredded cheese    YOGURT  [] Fage  2% low-fat, plain  [] Siggi's  plain, vanilla  [] Chobani Greek  nonfat + whole milk yogurt, plain   [] Chobani Less Sugar  all flavored varieties   [] Oikos Greek  nonfat, plain  [] Two Good  all varieties   [] Tamazight Provisions  plain  [] [place] Organic  low-fat + nonfat, plain  [] [place] Farm  goat milk yogurt, plain  [] Kefit  unsweetened, plain  [] Forager  Greek style unsweetened, plain [dairy-free]  [] [place]  unsweetened Greek style, plain [dairy-free]  [] [facility]  almond milk yogurt, vanilla or plain, unsweetened [dairy-free]    FREEZER SECTION  FROZEN VEGGIES  [] All plain frozen veggies + greens [e.g. broccoli, brussels, carrots, okra, mushrooms, zucchini, yellow squash, butternut squash, kale, spinach, dane greens]  [] Riced veggies [e.g. cauliflower, broccoli, butternut squash]  [] Edamame  all varieties  [] Green Giant  [] Veggie Spirals  [] Marinated Veggies [e.g. eggplant, peppers, zucchini]  [] Simply Steam Brady Sprouts  [] Birds Eye  [] Power Blend Italian Style  lentils, broccoli, kale, zucchini  []  Brady Sprouts & Carrots  [] Oven Roasters Broccoli & Cauliflower  [] [place] Blend  [] Tattooed   [] Green Bean Blend  [] Farmer's Market Ratatouille  [] Butter Balsamic Glazed Vegetables  [] Riced Cauliflower & Quinoa Mediterranean Style  [] Marlee's Good Life  Southern Style Greens [sauteed kale + onion]    FROZEN FRUITS  [] All unsweetened frozen fruits  all varieties  [] Dole Fruits & Veggie Blends  Berries 'n Kale  [] Dole Mix-ins  Triple Berry     FROZEN ENTREES  [] The Good Kitchen meals  all  varieties [ e.g. Chili Lime Chicken Over Riced Cauliflower]  [] Premium Paleo  Not Faustino Momma's Meatloaf  [] Primal Kitchen  Chicken Pesto + Steak Fajitas w/ Peppers & Onions  [] Eating Well Frozen Entrees  Butter Chicken Masala, Steak Carne Asada, Creamy Pesto Chicken, Chicken + Wild Rice Stroganoff, Yellow Presley Chicken, Sun-dried Tomato Chicken, Chicken Lo Mein  [] Realgood Entree Bowls  Bengali Inspired Beef Bowl over Riced Cauliflower, Chicken Burrito Bowl   [] Great Karma Coconut Presley  [] Deann's  Tamale Srinivasan with Black Beans, Vegetable Lasagna  [] Kashi Mayan [place] Bake  [] Healthy Choice  Simply Steamers Chicken Fried Rice  [] Basil Pesto Chicken & Kyrgyz Style Pork Power Bowls  [] Tattooed   Enchilada Bowl  [] Unruly Farms  Spicy Black Bean Burgers    FROZEN PIZZAS  [] Cauli'flour Foods  Cauliflower Pizza Crusts  [] Outer Aisle  Cauliflower Crust  [] Deann's  Veggie Crust Cheese Pizza  [] Quest Pizza     VEGETARIAN PRODUCTS  [] Beyond Meat  ground 'meat' + grilled 'chicken' strips  [] Tofurkey  Original Italian Sausage + Original Tempeh  [] Gardein  Beefless Ground + Meatless Meatballs  [] Unruly Farms Grillers  Original Burger, Crumbles, Meatballs    ICE CREAMS + FROZEN DESSERTS  [] Halo Top  regular + keto series, pops  [] Rebel  ice cream + dessert sandwiches  [] Enlightened  ice cream + bars  [] Nightfood  ice cream  [] Realgood  ice cream  [] Arctic Zero Fit  frozen pint  [] The Frozen Farmer  sorbets  [] Wholly Rollies  Protein Balls, all varieties  [] Dream Pops  Coconut Latte    FROZEN BREAKFASTS  [] Realgood  Breakfast Sandwiches on Cauliflower Cheesy Bread  [] Rebel  ice cream + dessert sandwiches  [] Enlightened  ice cream + bars  [] Nightfood  ice cream  [] Realgood  ice cream  [] Arctic Zero Fit  frozen pint  [] The Frozen Farmer  sorbets  [] Wholly Rollies  Protein Balls, all varieties  [] Dream Pops  Coconut Latte    BREADS/BUNS/WRAPS  [] Kemar  Bread: All Types - In Freezer Section   [] Flat Out Light Wraps - All Varieties   [] Flat Out Protein Up Carb Down Flat Bread   [] Kontos Whole Wheat Pocket Christina   [] Hayden DEMETRIA 100% Whole Wheat Tortillas   [] LaTortilla Factory Tortillas - Smart & Delicious; 50 or 80-calorie   [] Nature's Own 100% Whole Wheat Bread   [] Orowheat Healthful - 100% Whole Wheat Slice Bread and Braxton Thins   [] Orowheat Healthful - Whole Wheat Nuts & Grain Bread; Flax & Seed Bread   [] Pepperidge Farm Natural Whole Grain 15 Bread   [] Pepperidge Farm Natural Whole Grain English Muffin - 100% Whole Wheat   [] Pepperidge Farm Very Thin 100% Whole Wheat   [] Marisabel Valenzuela 45 Calories and Delightful   [] Raciel' 100% Whole Wheat Thin-Sliced Bagels and English Muffins   [] Western Bagel: Perfect 10     GLUTEN FREE  [] Darien's Gluten Free Bread   [] Liberty Bakehouse 7-Grain Gluten Free Bread     LEGUME PASTA   [] Explore Asian Organic Black Bean Spaghetti   [] Modern Table   [] Tolerant Foods       NUT BUTTERS & JELLIES    NUT BUTTERS   [] Better'n Chocolate: Coconut Chocolate Peanut Butter Spread   [] Better'n Peanut Butter - All Types   [] Earth Balance Coconut and Peanut Spread   [] Micheal's Nut [place]   [] MaraNatha: All Natural Roasted Cashew Butter - Leigh or Creamy   [] MaraNatha: Roasted Peanut Butter   [] Nuts 'N More Peanut [place] - All Flavors   [] PB2 Powder - Original or Chocolate   [] Skippy Natural - Creamy, Super Chunk   [] Smart Balance Peanut Butter - Leigh or Creamy   [] Peanut Butter & Company:   [] Smooth , Crunch Time, The Heat Is On, Old Fashioned Smooth, Mighty Nut- Powdered Peanut Butter, Squeeze Pack   [] Smucker's Natural Peanut Butter - Leigh or Creamy   [] Sunbutter Nut Butter   [] Wild Friends Protein Peanut Butter/[place] o Butter - Vanilla or Chocolate     JELLIES  o Polaner's All Fruit   o Clearly Organic Best Choice: Strawberry Fruit Spread       SNACKS    BARS  [] Kashi Bars - Chewy or Crunchy; Honey  Mitchells o Flax or Peanut Butter   [] KIND Bars - 5 Grams of Sugar or Less   [] KIND Protein Bars - Strong and KIND   [] Nature Valley Protein Bar - All Varieties   [] Nature Valley Roasted Nut Crunch - Mitchells Crunch; Peanut Crunch   [] [facility] Simple Nut Bar - Roasted Peanut & Honey   [] [facility] Simple Nut Bar - Mitchells, Cashew & Sea Salt   [] [facility] Nut Rosebud Bar - Salted Caramel Peanut   [] Think Thin Protein Bars   [] Quest Bars, Power Crunch Bars, Pure Protein Bars     BEEF JERKY - NITRATE FREE   [] Game On   [] Grass Run Farms   [] Krave   [] Ostrim   [] Perky Jerky   [] Primal Strips Meatless Vegan Jerky   [] [place]     CHIPS   [] Beanitos Chips   [] Fruit Crisps - e.g. Brother's-All-Natural, Bare Fruit, Yoga Chips   [] Cari's Soy Crisps: 1.3 ounce bag   [] Quest Protein Chips   [] Wasa Whole Wheat Crisp Bread     CRACKERS  [] Katy's Gone Crackers   [] Nabisco Triscuit: Regular and Thin Crisp Crackers   [] Vans Say Cheese Crackers (G-F)     POPCORN/NUTS   [] Jason Lagunas's Smart Pop Popcorn - Single Serving   [] 100-Calorie Pack of Nuts - All Varieties     PROTEIN POWDERS & DRINKS  []  Protein -  Whey Protein Powder   [] Garden of Life Raw Protein Powder   [] Iconic Ready-To-Drink Protein Drink   [] Baron One Protein Powder   [] VegaSport Protein Powder     SALSA/HUMMUS/DIPS   [] Eat Well Embrace Life: Zesty Sriracha Carrot o Hummus   [] Pre-Portioned Guacamole Packs   [] Saunders's   [] Tostitos Restaurant Style Salsa       SOUPS   [] Deann's Organic Soups - Lentil, Vegetable, Split Pea, Low-Sodium     CANNED GOODS   [] 100% Pure Pumpkin   [] BlueRunner Creole Cream-Style Red Beans or Navy Beans   [] Cajun Power Chicken Gumbo Base   [] Chicken of the Sea Morocco [place]   [] Mary Kay Fresh Cut Sliced Beets   [] Hormel Breast of Chicken in Water   [] LeSuer Tender Baby Whole Carrots   [] Mc[place]ny Tabasco [place] Starter   [] StarKist: Sidney Tripp in Water, Linda Select  Pouches   [] StarKist: Yellowfin Tuna Fillets   [] Trappey's: Kidney, Butter, Meraz, Black Eye, Field, and Black Beans   [] SHAWN Hnedricks's Turnip Greens or Ministerio Spinach     CONDIMENTS/ SAUCES/SPREADS/ SPICES  [] Simon Caceres's Magic Seasonings - Regular or Salt Free   [] Nicole Watkins's Sauces - All Flavors   [] Laughing Cow Light - All Flavors   [] Dash Salt-Free Marinade - All Flavors   [] Ghassan & Susana's: Heart Smart Pasta Sauces   [] Tabasco     SALAD DRESSINGS  [] Agueda's Naturals: Lite Honey Mustard   [] Narendra's Own: Lighten Up Salad Dressing - All Varieties   [] OPA Greek Yogurt Dressings - Ranch, Blue o Cheese, Caesar, Feta Dill     SWEETENERS  [] Sweet [place] Sweetener   [] Swerve   [] Truvia     BEVERAGES  [] Coconut Water   [] Crystal Light PURE - All Flavors   [] Honest Tea: Just Green Tea, Unsweetened   [] Kombucha Tea   [] [place]   [] [facility] Bloody Katy Mix   [] Metromint - Zero-Sugar; All Natural Flavored   [] Deja - Plain or Flavored   [] Clarisa Alfaroer   [] Steaz - Zero-Sugar, All-Natural, Sparkling Tea   [] Tea Bags: Any Brand - e.g. Liliana, Yogi, Tazzo, Celestial   [] V8 100% Vegetable Juice   [] Vitamin Water Zero   [] Water   [] Zevia - Stevia Sweetened Soft Drink     BEER/LIZ/LIQUORS  []Gary's Premier Light 64 Calories   [] Bud Select - 55 Calories   [] [facility] Bloody Katy Mix   [] Gregory Genuine Draft - 64 Calories   [] Red or White Wine - All Varieties     CEREALS: HOT/COLD   [] Soco's Puffin's Original Cereal  [] Yomi's Mill Oat Bran Hot Cereal - Cracked Wheat, Multi-Grain  [] Kashi GoLean Cereal  [] Kashi GoLean Hot Cereal packets - Vanilla; Honey Cinnamon  [] Diana's Special K Protein Cereal  [] Cassandra's Steel Cut Estonian Oatmeal  [] Nature's Path Smart Bran  [] Congregation Instant Oatmeal packet, Original  [] Congregation Old Fashioned Congregation Oats  [] Uncle Keshawn's Whole Wheat & Flaxseed Original Cereal

## 2023-09-06 ENCOUNTER — NURSE TRIAGE (OUTPATIENT)
Dept: ADMINISTRATIVE | Facility: CLINIC | Age: 23
End: 2023-09-06
Payer: OTHER GOVERNMENT

## 2023-09-07 ENCOUNTER — TELEPHONE (OUTPATIENT)
Dept: INTERNAL MEDICINE | Facility: CLINIC | Age: 23
End: 2023-09-07
Payer: OTHER GOVERNMENT

## 2023-09-07 NOTE — TELEPHONE ENCOUNTER
----- Message from Ángel Blackwood sent at 9/7/2023  8:16 AM CDT -----  Contact: Herminio  .Type:  Patient Returning Call    Who Called:Herminio   Who Left Message for Patient:nurse   Does the patient know what this is regarding?:unknown   Would the patient rather a call back or a response via [facility]? Call   Best Call Back Number:.[phone]  Additional Information: returning call

## 2023-09-07 NOTE — TELEPHONE ENCOUNTER
"Pt calls stating that he has been having dull pain in his chest near "where my heart is" since about 3 pm today. Pt reports that the pain is intermittent, but has been increasing in frequency. He denies any difficulty breathing, hx of heart disease, or issues with his BP and HR.     Care Advice recommends that pt be seen in ED now. Pt verbalizes understanding and is instructed to call back with any new/worsening sxs, questions, or concerns.   Reason for Disposition   [1] Chest pain (or "angina") comes and goes AND [2] is happening more often (increasing in frequency) or getting worse (increasing in severity)  (Exception: Chest pains that last only a few seconds.)    Additional Information   Negative: SEVERE difficulty breathing (e.g., struggling for each breath, speaks in single words)   Negative: Difficult to awaken or acting confused (e.g., disoriented, slurred speech)   Negative: Shock suspected (e.g., cold/pale/clammy skin, too weak to stand, low BP, rapid pulse)   Negative: Passed out (i.e., lost consciousness, collapsed and was not responding)   Negative: [1] Chest pain lasts > 5 minutes AND [2] age > 44   Negative: [1] Chest pain lasts > 5 minutes AND [2] age > 30 AND [3] one or more cardiac risk factors (e.g., diabetes, high blood pressure, high cholesterol, smoker, or strong family history of heart disease)   Negative: [1] Chest pain lasts > 5 minutes AND [2] history of heart disease (i.e., angina, heart attack, heart failure, bypass surgery, takes nitroglycerin)   Negative: [1] Chest pain lasts > 5 minutes AND [2] described as crushing, pressure-like, or heavy   Negative: Heart beating < 50 beats per minute OR > 140 beats per minute   Negative: Visible sweat on face or sweat dripping down face   Negative: Sounds like a life-threatening emergency to the triager   Negative: SEVERE chest pain     Describes pain as mild    Protocols used: Chest Pain-A-AH    "

## 2023-09-26 DIAGNOSIS — F90.9 ATTENTION DEFICIT DISORDER WITH HYPERACTIVITY: ICD-10-CM

## 2023-09-26 RX ORDER — DEXTROAMPHETAMINE SACCHARATE, AMPHETAMINE ASPARTATE MONOHYDRATE, DEXTROAMPHETAMINE SULFATE AND AMPHETAMINE SULFATE 5; 5; 5; 5 MG/1; MG/1; MG/1; MG/1
20 CAPSULE, EXTENDED RELEASE ORAL EVERY MORNING
Qty: 30 CAPSULE | Refills: 0 | Status: SHIPPED | OUTPATIENT
Start: 2023-09-26 | End: 2023-10-23 | Stop reason: SDUPTHER

## 2023-10-12 ENCOUNTER — OFFICE VISIT (OUTPATIENT)
Dept: PRIMARY CARE CLINIC | Facility: CLINIC | Age: 23
End: 2023-10-12
Payer: OTHER GOVERNMENT

## 2023-10-12 VITALS
SYSTOLIC BLOOD PRESSURE: 120 MMHG | HEIGHT: 74 IN | WEIGHT: 315 LBS | RESPIRATION RATE: 18 BRPM | BODY MASS INDEX: 40.43 KG/M2 | TEMPERATURE: 99 F | HEART RATE: 100 BPM | DIASTOLIC BLOOD PRESSURE: 80 MMHG | OXYGEN SATURATION: 97 %

## 2023-10-12 DIAGNOSIS — F84.0 AUTISM DISORDER: ICD-10-CM

## 2023-10-12 DIAGNOSIS — K76.0 NAFLD (NONALCOHOLIC FATTY LIVER DISEASE): ICD-10-CM

## 2023-10-12 DIAGNOSIS — F41.9 ANXIETY DISORDER, UNSPECIFIED TYPE: ICD-10-CM

## 2023-10-12 DIAGNOSIS — F31.62 BIPOLAR 1 DISORDER, MIXED, MODERATE: ICD-10-CM

## 2023-10-12 DIAGNOSIS — E66.01 CLASS 3 SEVERE OBESITY DUE TO EXCESS CALORIES WITH SERIOUS COMORBIDITY AND BODY MASS INDEX (BMI) OF 45.0 TO 49.9 IN ADULT: ICD-10-CM

## 2023-10-12 PROBLEM — E66.813 CLASS 3 SEVERE OBESITY DUE TO EXCESS CALORIES WITH SERIOUS COMORBIDITY AND BODY MASS INDEX (BMI) OF 45.0 TO 49.9 IN ADULT: Status: ACTIVE | Noted: 2023-10-12

## 2023-10-12 PROCEDURE — 99215 OFFICE O/P EST HI 40 MIN: CPT | Mod: PBBFAC | Performed by: FAMILY MEDICINE

## 2023-10-12 PROCEDURE — 99999 PR PBB SHADOW E&M-EST. PATIENT-LVL V: ICD-10-PCS | Mod: PBBFAC,,, | Performed by: FAMILY MEDICINE

## 2023-10-12 PROCEDURE — 99214 OFFICE O/P EST MOD 30 MIN: CPT | Mod: S$PBB,,, | Performed by: FAMILY MEDICINE

## 2023-10-12 PROCEDURE — 99999 PR PBB SHADOW E&M-EST. PATIENT-LVL V: CPT | Mod: PBBFAC,,, | Performed by: FAMILY MEDICINE

## 2023-10-12 PROCEDURE — 99214 PR OFFICE/OUTPT VISIT, EST, LEVL IV, 30-39 MIN: ICD-10-PCS | Mod: S$PBB,,, | Performed by: FAMILY MEDICINE

## 2023-10-13 ENCOUNTER — PATIENT MESSAGE (OUTPATIENT)
Dept: HEPATOLOGY | Facility: CLINIC | Age: 23
End: 2023-10-13
Payer: OTHER GOVERNMENT

## 2023-10-16 ENCOUNTER — PATIENT MESSAGE (OUTPATIENT)
Dept: PRIMARY CARE CLINIC | Facility: CLINIC | Age: 23
End: 2023-10-16
Payer: OTHER GOVERNMENT

## 2023-10-20 ENCOUNTER — TELEPHONE (OUTPATIENT)
Dept: PSYCHIATRY | Facility: CLINIC | Age: 23
End: 2023-10-20
Payer: OTHER GOVERNMENT

## 2023-10-23 ENCOUNTER — OFFICE VISIT (OUTPATIENT)
Dept: PSYCHIATRY | Facility: CLINIC | Age: 23
End: 2023-10-23
Payer: OTHER GOVERNMENT

## 2023-10-23 DIAGNOSIS — F41.9 ANXIETY DISORDER, UNSPECIFIED TYPE: ICD-10-CM

## 2023-10-23 DIAGNOSIS — F84.0 AUTISM DISORDER: ICD-10-CM

## 2023-10-23 DIAGNOSIS — E66.01 CLASS 3 SEVERE OBESITY DUE TO EXCESS CALORIES WITH SERIOUS COMORBIDITY AND BODY MASS INDEX (BMI) OF 45.0 TO 49.9 IN ADULT: ICD-10-CM

## 2023-10-23 DIAGNOSIS — F31.9 BIPOLAR AFFECTIVE DISORDER, REMISSION STATUS UNSPECIFIED: Primary | ICD-10-CM

## 2023-10-23 DIAGNOSIS — F90.9 ATTENTION DEFICIT DISORDER WITH HYPERACTIVITY: ICD-10-CM

## 2023-10-23 PROCEDURE — 99214 OFFICE O/P EST MOD 30 MIN: CPT | Mod: S$PBB,,, | Performed by: PSYCHIATRY & NEUROLOGY

## 2023-10-23 PROCEDURE — 99214 PR OFFICE/OUTPT VISIT, EST, LEVL IV, 30-39 MIN: ICD-10-PCS | Mod: S$PBB,,, | Performed by: PSYCHIATRY & NEUROLOGY

## 2023-10-23 RX ORDER — DEXTROAMPHETAMINE SACCHARATE, AMPHETAMINE ASPARTATE MONOHYDRATE, DEXTROAMPHETAMINE SULFATE AND AMPHETAMINE SULFATE 5; 5; 5; 5 MG/1; MG/1; MG/1; MG/1
20 CAPSULE, EXTENDED RELEASE ORAL EVERY MORNING
Qty: 30 CAPSULE | Refills: 0 | Status: SHIPPED | OUTPATIENT
Start: 2023-10-23 | End: 2023-12-21 | Stop reason: SDUPTHER

## 2023-10-23 RX ORDER — FLUOXETINE HYDROCHLORIDE 20 MG/1
60 CAPSULE ORAL DAILY
Qty: 270 CAPSULE | Refills: 0 | Status: SHIPPED | OUTPATIENT
Start: 2023-10-23 | End: 2023-12-21 | Stop reason: SDUPTHER

## 2023-10-23 RX ORDER — ZIPRASIDONE HYDROCHLORIDE 80 MG/1
80 CAPSULE ORAL DAILY
Qty: 90 CAPSULE | Refills: 0 | Status: SHIPPED | OUTPATIENT
Start: 2023-10-23 | End: 2023-12-21 | Stop reason: SDUPTHER

## 2023-10-23 RX ORDER — TOPIRAMATE 25 MG/1
25 TABLET ORAL DAILY
Qty: 90 TABLET | Refills: 0 | Status: SHIPPED | OUTPATIENT
Start: 2023-10-23 | End: 2023-12-21

## 2023-10-23 RX ORDER — DEXTROAMPHETAMINE SACCHARATE, AMPHETAMINE ASPARTATE MONOHYDRATE, DEXTROAMPHETAMINE SULFATE AND AMPHETAMINE SULFATE 5; 5; 5; 5 MG/1; MG/1; MG/1; MG/1
20 CAPSULE, EXTENDED RELEASE ORAL EVERY MORNING
Qty: 30 CAPSULE | Refills: 0 | Status: SHIPPED | OUTPATIENT
Start: 2023-10-23 | End: 2023-11-23 | Stop reason: SDUPTHER

## 2023-10-23 NOTE — PROGRESS NOTES
Herminio Salcedonett   2000   10/23/2023        CURRENT PRESENTATION:   The patient presents for follow-up of bipolar disorder, anxiety, ADHD, and autism.  When he was last seen 2 months ago, the medication plan was: Continue Prozac 60 mg daily.  Geodon to 80 mg in the morning with breakfast.  Continue Adderall XR 20 mg daily but do not take the dose beyond 2:00 p.m..  Continue Adderall IR 10 mg daily in the afternoon, but do not take the dose beyond 5:00 p.m..    In the current session, the patient has no complaints, concerns, or questions, but does note that Dr. Ramirez had wondered about a medication to help manage his obesity.  Per her note, the considerations from a psychiatric standpoint were Wellbutrin and Topamax.  Discussion reveals that the patient has no particular preference between the 2 medications, and we will start with Topamax.  I reviewed with him rationale, indications, risks, and side effects, including reviewing with him the patient information from up-to-date, which I printed and gave him to take in the event that he wished to review the issues again.  He indicated that he was satisfied with the discussion and had no further questions and requested the medication.  The patient is aware to take particular cautions not become overheated, especially since he is walking again.  He will be vigilant for any rash and will discontinue the medication and contact me for any sign of rash.  He has no history of kidney stones.    Euthymic with no depression, excessively elevated moods, irritable moods, or manic signs or symptoms.  Anxiety is well controlled.  No psychosis.  No thoughts of harm to self or others or feelings of aggression.  Sleeping from between 11:00 p.m. and midnight until between 9:00 a.m. and 10:00 a.m. in the morning, indicating that he has shifted sleep to be more awake during the day.  No subjective or objective side effects of medications.  AIMS 0.      Interim history:  Living  situation/supports:  The patient reports that he has been playing video games and has enjoyed buying a few games on day, spending 100 dollars, within his budget and not excessive.  He has started walking again now that the weather is cooler.  He sees his grandparents at least once a week and his aunt next door almost daily.  He reports that he is getting along with his parents and with his 16-year-old sister.  Medical issues:  The patient has been seen by Dr. James Oswald of lifestyle and wellness with diagnoses of obesity and hepatic steatosis.  He was referred to hepatology and to Nutrition Services.  Wellbutrin, Topamax, and Wegovy are being considered  Nonpsychotropic Medications:  Metformin   Allergies:  No change  Review of patient's allergies indicates:   Allergen Reactions    Abilify [aripiprazole] Other (See Comments)     Some impulsive buying (Xbox: Dec 2022) and some wt gain     Alcohol use:  The patient reports 3 shots on occasions once or twice in a week then up to a few weeks with no use.  He denies any problems from his use, but I cautioned him with regards to any alcohol with his medications.  He expressed understanding.  Other substance use:  None    Mental Status Exam:   Appearance:  Appropriately groomed  Orientation:  X4  Attitude:  Cooperative, engaged   Eye Contact:  Appropriate  Behavior:  Calm, appropriate  Speech:     Rate - WNL    Volume - WNL    Quantity - WNL    Tone - relaxed, appropriate  Pressure - no  Thought Processes:  Goal-directed  Mood:  Euthymic   Affect:  Without distress, euthymic, including ability to brighten at appropriate times  SI:  No, and no thoughts of self-harm  HI:  No, and no thoughts of harm towards others  Paranoia:  No  Delusions:  No  Hallucinations:  No  Attention:  Intact over the course of the session  Cognition:  No deficits noted over the course of the session  Insight:  Intact   Judgment:  Intact  Impulse Control:  Intact          ASSESSMENT:   Encounter  Diagnoses   Name Primary?    Bipolar affective disorder, remission status unspecified Yes    Anxiety disorder, unspecified type     Attention deficit disorder with hyperactivity     Autism disorder     Class 3 severe obesity due to excess calories with serious comorbidity and body mass index (BMI) of 45.0 to 49.9 in adult          PLAN:     Follow up in 2 months.      Psychiatry Medication:  Continue Geodon 80 mg daily, Prozac 60 mg daily, Adderall XR 20 mg daily (he reports that he and no longer needed Adderall IR 10 mg).  Begin Topamax 25 mg daily.    Reviewed with patient:  Report side effects or any other problems to the psychiatrist during clinic business hours.  Call 911 or go to an emergency department for any acute or urgent issues otherwise.  Follow up with primary care/MD specialist for continued monitoring of general health and wellness and any medical conditions.  Call  [facility] at [phone] or go to [facility] if necessary for scheduling or rescheduling.  Understanding was expressed; and no further concerns or questions were raised at this time.     [id number]  2 or more stable chronic illnesses and Prescription drug management      Large portions of this note were completed by way of voice recognition dictation software, and transcription errors are possible, such that specific information in the note should be considered in the context of the entire report.

## 2023-10-26 ENCOUNTER — PATIENT MESSAGE (OUTPATIENT)
Dept: PSYCHIATRY | Facility: CLINIC | Age: 23
End: 2023-10-26
Payer: OTHER GOVERNMENT

## 2023-11-07 ENCOUNTER — OFFICE VISIT (OUTPATIENT)
Dept: HEPATOLOGY | Facility: CLINIC | Age: 23
End: 2023-11-07
Payer: OTHER GOVERNMENT

## 2023-11-07 ENCOUNTER — LAB VISIT (OUTPATIENT)
Dept: LAB | Facility: HOSPITAL | Age: 23
End: 2023-11-07
Attending: NURSE PRACTITIONER
Payer: OTHER GOVERNMENT

## 2023-11-07 VITALS
SYSTOLIC BLOOD PRESSURE: 126 MMHG | BODY MASS INDEX: 40.43 KG/M2 | HEART RATE: 96 BPM | DIASTOLIC BLOOD PRESSURE: 74 MMHG | WEIGHT: 315 LBS | HEIGHT: 74 IN

## 2023-11-07 DIAGNOSIS — K76.0 NAFLD (NONALCOHOLIC FATTY LIVER DISEASE): ICD-10-CM

## 2023-11-07 DIAGNOSIS — E66.01 CLASS 3 SEVERE OBESITY DUE TO EXCESS CALORIES WITH SERIOUS COMORBIDITY AND BODY MASS INDEX (BMI) OF 45.0 TO 49.9 IN ADULT: ICD-10-CM

## 2023-11-07 LAB
A1AT SERPL-MCNC: 152 MG/DL (ref 100–190)
ALBUMIN SERPL BCP-MCNC: 3.8 G/DL (ref 3.5–5.2)
ALP SERPL-CCNC: 104 U/L (ref 55–135)
ALT SERPL W/O P-5'-P-CCNC: 65 U/L (ref 10–44)
ANION GAP SERPL CALC-SCNC: 10 MMOL/L (ref 8–16)
AST SERPL-CCNC: 35 U/L (ref 10–40)
BASOPHILS # BLD AUTO: 0.03 K/UL (ref 0–0.2)
BASOPHILS NFR BLD: 0.4 % (ref 0–1.9)
BILIRUB DIRECT SERPL-MCNC: 0.1 MG/DL (ref 0.1–0.3)
BILIRUB SERPL-MCNC: 0.3 MG/DL (ref 0.1–1)
BUN SERPL-MCNC: 13 MG/DL (ref 6–20)
CALCIUM SERPL-MCNC: 9.6 MG/DL (ref 8.7–10.5)
CERULOPLASMIN SERPL-MCNC: 41 MG/DL (ref 15–45)
CHLORIDE SERPL-SCNC: 105 MMOL/L (ref 95–110)
CO2 SERPL-SCNC: 23 MMOL/L (ref 23–29)
CREAT SERPL-MCNC: 0.6 MG/DL (ref 0.5–1.4)
DIFFERENTIAL METHOD: ABNORMAL
EOSINOPHIL # BLD AUTO: 0.1 K/UL (ref 0–0.5)
EOSINOPHIL NFR BLD: 1 % (ref 0–8)
ERYTHROCYTE [DISTWIDTH] IN BLOOD BY AUTOMATED COUNT: 12.7 % (ref 11.5–14.5)
EST. GFR  (NO RACE VARIABLE): >60 ML/MIN/1.73 M^2
FERRITIN SERPL-MCNC: 115 NG/ML (ref 20–300)
GLUCOSE SERPL-MCNC: 102 MG/DL (ref 70–110)
HAV IGG SER QL IA: REACTIVE
HBV CORE AB SERPL QL IA: NORMAL
HBV SURFACE AB SER-ACNC: <3 MIU/ML
HBV SURFACE AB SER-ACNC: NORMAL M[IU]/ML
HBV SURFACE AG SERPL QL IA: NORMAL
HCT VFR BLD AUTO: 41.2 % (ref 40–54)
HCV AB SERPL QL IA: NORMAL
HGB BLD-MCNC: 13.4 G/DL (ref 14–18)
IMM GRANULOCYTES # BLD AUTO: 0.04 K/UL (ref 0–0.04)
IMM GRANULOCYTES NFR BLD AUTO: 0.6 % (ref 0–0.5)
INR PPP: 1 (ref 0.8–1.2)
IRON SERPL-MCNC: 68 UG/DL (ref 45–160)
LYMPHOCYTES # BLD AUTO: 2.3 K/UL (ref 1–4.8)
LYMPHOCYTES NFR BLD: 32.2 % (ref 18–48)
MCH RBC QN AUTO: 27.9 PG (ref 27–31)
MCHC RBC AUTO-ENTMCNC: 32.5 G/DL (ref 32–36)
MCV RBC AUTO: 86 FL (ref 82–98)
MONOCYTES # BLD AUTO: 0.5 K/UL (ref 0.3–1)
MONOCYTES NFR BLD: 6.3 % (ref 4–15)
NEUTROPHILS # BLD AUTO: 4.3 K/UL (ref 1.8–7.7)
NEUTROPHILS NFR BLD: 59.5 % (ref 38–73)
NRBC BLD-RTO: 0 /100 WBC
PLATELET # BLD AUTO: 279 K/UL (ref 150–450)
PMV BLD AUTO: 10 FL (ref 9.2–12.9)
POTASSIUM SERPL-SCNC: 3.7 MMOL/L (ref 3.5–5.1)
PROT SERPL-MCNC: 7.1 G/DL (ref 6–8.4)
PROTHROMBIN TIME: 10.6 SEC (ref 9–12.5)
RBC # BLD AUTO: 4.81 M/UL (ref 4.6–6.2)
SATURATED IRON: 15 % (ref 20–50)
SODIUM SERPL-SCNC: 138 MMOL/L (ref 136–145)
TOTAL IRON BINDING CAPACITY: 459 UG/DL (ref 250–450)
TRANSFERRIN SERPL-MCNC: 310 MG/DL (ref 200–375)
WBC # BLD AUTO: 7.18 K/UL (ref 3.9–12.7)

## 2023-11-07 PROCEDURE — 84466 ASSAY OF TRANSFERRIN: CPT | Performed by: NURSE PRACTITIONER

## 2023-11-07 PROCEDURE — 36415 COLL VENOUS BLD VENIPUNCTURE: CPT | Performed by: NURSE PRACTITIONER

## 2023-11-07 PROCEDURE — 99204 PR OFFICE/OUTPT VISIT, NEW, LEVL IV, 45-59 MIN: ICD-10-PCS | Mod: S$PBB,,, | Performed by: NURSE PRACTITIONER

## 2023-11-07 PROCEDURE — 86381 MITOCHONDRIAL ANTIBODY EACH: CPT | Performed by: NURSE PRACTITIONER

## 2023-11-07 PROCEDURE — 80076 HEPATIC FUNCTION PANEL: CPT | Performed by: NURSE PRACTITIONER

## 2023-11-07 PROCEDURE — 86038 ANTINUCLEAR ANTIBODIES: CPT | Performed by: NURSE PRACTITIONER

## 2023-11-07 PROCEDURE — 99214 OFFICE O/P EST MOD 30 MIN: CPT | Mod: PBBFAC | Performed by: NURSE PRACTITIONER

## 2023-11-07 PROCEDURE — 99999 PR PBB SHADOW E&M-EST. PATIENT-LVL IV: ICD-10-PCS | Mod: PBBFAC,,, | Performed by: NURSE PRACTITIONER

## 2023-11-07 PROCEDURE — 82728 ASSAY OF FERRITIN: CPT | Performed by: NURSE PRACTITIONER

## 2023-11-07 PROCEDURE — 82103 ALPHA-1-ANTITRYPSIN TOTAL: CPT | Performed by: NURSE PRACTITIONER

## 2023-11-07 PROCEDURE — 86704 HEP B CORE ANTIBODY TOTAL: CPT | Performed by: NURSE PRACTITIONER

## 2023-11-07 PROCEDURE — 86803 HEPATITIS C AB TEST: CPT | Performed by: NURSE PRACTITIONER

## 2023-11-07 PROCEDURE — 99204 OFFICE O/P NEW MOD 45 MIN: CPT | Mod: S$PBB,,, | Performed by: NURSE PRACTITIONER

## 2023-11-07 PROCEDURE — 87340 HEPATITIS B SURFACE AG IA: CPT | Performed by: NURSE PRACTITIONER

## 2023-11-07 PROCEDURE — 86015 ACTIN ANTIBODY EACH: CPT | Performed by: NURSE PRACTITIONER

## 2023-11-07 PROCEDURE — 86790 VIRUS ANTIBODY NOS: CPT | Performed by: NURSE PRACTITIONER

## 2023-11-07 PROCEDURE — 85610 PROTHROMBIN TIME: CPT | Performed by: NURSE PRACTITIONER

## 2023-11-07 PROCEDURE — 86706 HEP B SURFACE ANTIBODY: CPT | Performed by: NURSE PRACTITIONER

## 2023-11-07 PROCEDURE — 82390 ASSAY OF CERULOPLASMIN: CPT | Performed by: NURSE PRACTITIONER

## 2023-11-07 PROCEDURE — 99999 PR PBB SHADOW E&M-EST. PATIENT-LVL IV: CPT | Mod: PBBFAC,,, | Performed by: NURSE PRACTITIONER

## 2023-11-07 PROCEDURE — 83540 ASSAY OF IRON: CPT | Performed by: NURSE PRACTITIONER

## 2023-11-07 PROCEDURE — 85025 COMPLETE CBC W/AUTO DIFF WBC: CPT | Performed by: NURSE PRACTITIONER

## 2023-11-07 PROCEDURE — 80048 BASIC METABOLIC PNL TOTAL CA: CPT | Performed by: NURSE PRACTITIONER

## 2023-11-07 NOTE — PROGRESS NOTES
Clinic Consult:  [facility] Consultation Note    Reason for Consult:  Diagnoses of Class 3 severe obesity due to excess calories with serious comorbidity and body mass index (BMI) of 45.0 to 49.9 in adult and NAFLD (nonalcoholic fatty liver disease) were pertinent to this visit.    PCP: Giancarlo Mesa   [id number] [facility] / [place] [id number]    HPI:  This is a 23 y.o. male here for evaluation of the above  Pt referred for further evaluation of elevated LFTs and noted steatosis on US  Pt states that he has been feeling overall well without any GI complaints.   Denies any ETOH  PMH includes obesity.   He has been trying to do better with nutrition and taking medication as prescribed.   Denies any abdominal pain.  No nausea or vomiting.  No change in bowel pattern.  No melena or hematochezia. No weight loss.  No upper GI bleeding.  No ascites or BLE.  No overt confusion.  Per chart review, LFTs have been elevated since 2019 with progressive worsening.  He is not a candidate for fibroscan due to BMI        Review of Systems   Constitutional:  Negative for chills, fever, malaise/fatigue and weight loss.   Respiratory:  Negative for cough.    Cardiovascular:  Negative for chest pain.   Gastrointestinal:         Per HPI   Musculoskeletal:  Negative for myalgias.   Skin:  Negative for itching and rash.   Neurological:  Negative for headaches.   Psychiatric/Behavioral:  The patient is not nervous/anxious.        Medical History:   Past Medical History:   Diagnosis Date    ADD (attention deficit disorder with hyperactivity) 6/18/2013    Anxiety     Depression        Surgical History:  Past Surgical History:   Procedure Laterality Date    ADENOIDECTOMY      CIRCUMCISION      TONSILLECTOMY      TYMPANOSTOMY TUBE PLACEMENT         Family History:   Family History   Adopted: Yes   Problem Relation Age of Onset    Crohn's disease Mother     No Known Problems Father        Social History:   Social History  "    Tobacco Use    Smoking status: Never     Passive exposure: Never    Smokeless tobacco: Never   Substance Use Topics    Alcohol use: No    Drug use: Never       Allergies: Reviewed    Home Medications:   Current Outpatient Medications on File Prior to Visit   Medication Sig Dispense Refill    dextroamphetamine-amphetamine (ADDERALL XR) 20 MG 24 hr capsule Take 1 capsule (20 mg total) by mouth every morning. Fill on or after 12/01/2023. 30 capsule 0    dextroamphetamine-amphetamine 10 mg Tab Take 1 tablet (10 mg total) by mouth once daily. 30 tablet 0    FLUoxetine 20 MG capsule Take 3 capsules (60 mg total) by mouth once daily. 270 capsule 0    ketoconazole (NIZORAL) 2 % shampoo Apply topically 3 (three) times a week. 120 mL 2    metFORMIN (GLUCOPHAGE-XR) 500 MG ER 24hr tablet Take 1 tablet (500 mg total) by mouth once daily for 14 days, THEN 1 tablet (500 mg total) 2 (two) times daily with meals for 14 days. 60 tablet 2    ziprasidone (GEODON) 80 MG capsule Take 1 capsule (80 mg total) by mouth once daily. 90 capsule 0    dextroamphetamine-amphetamine (ADDERALL XR) 20 MG 24 hr capsule Take 1 capsule (20 mg total) by mouth every morning. Fill on or after 11/01/2023. (Patient not taking: Reported on 11/7/2023) 30 capsule 0    topiramate (TOPAMAX) 25 MG tablet Take 1 tablet (25 mg total) by mouth once daily. (Patient not taking: Reported on 11/7/2023) 90 tablet 0     No current facility-administered medications on file prior to visit.       Physical Exam:  Vital Signs:  /74 (BP Location: Left arm, Patient Position: Sitting, BP Method: Large (Manual))   Pulse 96   Ht 6' 2" (1.88 m)   Wt (!) 168.6 kg (371 lb 11.1 oz)   BMI 47.72 kg/m²   Body mass index is 47.72 kg/m².  Physical Exam  Vitals reviewed.   Constitutional:       Appearance: He is well-developed. He is obese.   HENT:      Head: Normocephalic and atraumatic.   Eyes:      General: No scleral icterus.  Cardiovascular:      Rate and Rhythm: Normal " rate.   Pulmonary:      Effort: Pulmonary effort is normal.   Abdominal:      General: There is no distension.      Palpations: Abdomen is soft.   Musculoskeletal:         General: Normal range of motion.      Cervical back: Normal range of motion.   Skin:     General: Skin is dry.   Neurological:      Mental Status: He is alert and oriented to person, place, and time.         Labs: Pertinent labs reviewed.      Assessment:  1. Class 3 severe obesity due to excess calories with serious comorbidity and body mass index (BMI) of 45.0 to 49.9 in adult    2. NAFLD (nonalcoholic fatty liver disease)         Recommendations:  - Educated patient on spectrum of fatty liver disease and potential for cirrhosis if GOLD present  - Advised weight loss (10%), strict glycemic control and lipid control (statins are ok if needed, prescribing doctor will need to monitor LFTs per routine)  - Mediterranean diet discussed  - will get additional labs to R/O other etiologies of liver disease.   - if the LFTs continue to be elevated, will need liver biopsy for staging.         F/U in 3 months to review all workup and make further recommendations.         Thank you so much for allowing me to participate in the care of JASON Bailey

## 2023-11-08 ENCOUNTER — TELEPHONE (OUTPATIENT)
Dept: HEPATOLOGY | Facility: CLINIC | Age: 23
End: 2023-11-08
Payer: OTHER GOVERNMENT

## 2023-11-08 LAB — ANA SER QL IF: NORMAL

## 2023-11-08 NOTE — TELEPHONE ENCOUNTER
----- Message from Simi Younger MA sent at 11/7/2023  8:19 AM CST -----  Patient seen today by JASON Taylor. She is requesting this patient to have a follow up in 3 months for NAFLD. The next available I am able to schedule for him is April 10th, 2024. Patient has been made aware the Hepatology Coordinator will reach out to assist with scheduling this appointment.

## 2023-11-08 NOTE — TELEPHONE ENCOUNTER
Attempted to contact patient with no answer. Left voicemail message for patient to return call.

## 2023-11-09 LAB
MITOCHONDRIA AB TITR SER IF: NORMAL {TITER}
SMOOTH MUSCLE AB TITR SER IF: NORMAL {TITER}

## 2023-11-21 ENCOUNTER — PATIENT MESSAGE (OUTPATIENT)
Dept: INTERNAL MEDICINE | Facility: CLINIC | Age: 23
End: 2023-11-21
Payer: OTHER GOVERNMENT

## 2023-11-23 DIAGNOSIS — F90.9 ATTENTION DEFICIT DISORDER WITH HYPERACTIVITY: ICD-10-CM

## 2023-11-24 RX ORDER — DEXTROAMPHETAMINE SACCHARATE, AMPHETAMINE ASPARTATE MONOHYDRATE, DEXTROAMPHETAMINE SULFATE AND AMPHETAMINE SULFATE 5; 5; 5; 5 MG/1; MG/1; MG/1; MG/1
20 CAPSULE, EXTENDED RELEASE ORAL EVERY MORNING
Qty: 30 CAPSULE | Refills: 0 | Status: SHIPPED | OUTPATIENT
Start: 2023-11-24 | End: 2023-12-21 | Stop reason: SDUPTHER

## 2023-11-24 NOTE — TELEPHONE ENCOUNTER
A request to refill Adderall XR 20 mg was received.   indicates that the last refill was October 24.  The refill request is being authorized.

## 2023-12-06 ENCOUNTER — NUTRITION (OUTPATIENT)
Dept: PRIMARY CARE CLINIC | Facility: CLINIC | Age: 23
End: 2023-12-06
Payer: OTHER GOVERNMENT

## 2023-12-06 DIAGNOSIS — E66.01 CLASS 3 SEVERE OBESITY DUE TO EXCESS CALORIES WITH SERIOUS COMORBIDITY AND BODY MASS INDEX (BMI) OF 45.0 TO 49.9 IN ADULT: ICD-10-CM

## 2023-12-06 DIAGNOSIS — K76.0 NAFLD (NONALCOHOLIC FATTY LIVER DISEASE): ICD-10-CM

## 2023-12-06 PROCEDURE — 99999 PR PBB SHADOW E&M-EST. PATIENT-LVL II: CPT | Mod: PBBFAC,,, | Performed by: DIETITIAN, REGISTERED

## 2023-12-06 PROCEDURE — 99999 PR PBB SHADOW E&M-EST. PATIENT-LVL II: ICD-10-PCS | Mod: PBBFAC,,, | Performed by: DIETITIAN, REGISTERED

## 2023-12-06 PROCEDURE — 99212 OFFICE O/P EST SF 10 MIN: CPT | Mod: PBBFAC | Performed by: DIETITIAN, REGISTERED

## 2023-12-06 NOTE — PROGRESS NOTES
"Nutrition Assessment    Visit Type: Insurance initial  Session Time:  2 Hours  Reason for MNT visit: Pt in for education and nutrition counseling regarding Fatty Liver and Obesity.     Age: 23 y.o.  Wt:   Wt Readings from Last 10 Encounters:   11/07/23 (!) 168.6 kg (371 lb 11.1 oz)   10/12/23 (!) 166.2 kg (366 lb 6.5 oz)   08/16/23 (!) 162 kg (357 lb 2.3 oz)   07/07/23 (!) 165 kg (363 lb 12.1 oz)   06/05/23 (!) 161.6 kg (356 lb 4.2 oz)   03/01/23 (!) 164.9 kg (363 lb 8.6 oz)   01/19/23 (!) 158.3 kg (349 lb)   12/09/22 (!) 152.4 kg (335 lb 15.7 oz)   12/07/22 (!) 150.1 kg (331 lb)   10/05/22 (!) 149.6 kg (329 lb 11.2 oz)     Ht:   Ht Readings from Last 1 Encounters:   11/07/23 6' 2" (1.88 m)     BMI:   BMI Readings from Last 5 Encounters:   11/07/23 47.72 kg/m²   10/12/23 47.04 kg/m²   08/16/23 45.85 kg/m²   07/07/23 46.70 kg/m²   06/05/23 44.53 kg/m²       Client states:  Has been trying to lose weight, has tried to cut out some eating late night. Has been trying to cut out a lot of added sugars since June. His parents cook all of his items. He has been walking 1 mile a day for the past few months and has been working on increasing endurance. He is very sleepy during the day   Discussed:  Protein needs  Fiber needs  Physical activity recommendations of at least 150 minutes of moderate-intensity cardiovascular activity per week  Planning meals out with family  Potentially cooking breakfast and/or lunch himself  Focusing on fiber + protein at meals / snacks  Fueling every 3-4 hours  How to increase communication with parents about food choices  How to increase walking and adding in resistance training       Medical History  Problem List             Resolved    ADD (attention deficit disorder with hyperactivity)         Anxiety disorder         Bipolar 1 disorder (Jan 25 2022: referred in on Latuda 40 mg)         Gender identity issues; in adol identified some  as female tho parents in record report that actauly no " attraction to either male or female         Autism disorder ( formerly Asperger)         Bipolar 1 disorder, mixed, moderate         Class 3 severe obesity due to excess calories with serious comorbidity and body mass index (BMI) of 45.0 to 49.9 in adult         NAFLD (nonalcoholic fatty liver disease)            Past Medical History:   Diagnosis Date    ADD (attention deficit disorder with hyperactivity) 6/18/2013    Anxiety     Depression        Past Surgical History:   Procedure Laterality Date    ADENOIDECTOMY      CIRCUMCISION      TONSILLECTOMY      TYMPANOSTOMY TUBE PLACEMENT          Medications    Prior to Admission medications    Medication Sig Start Date End Date Taking? Authorizing Provider   dextroamphetamine-amphetamine (ADDERALL XR) 20 MG 24 hr capsule Take 1 capsule (20 mg total) by mouth every morning. Fill on or after 12/01/2023. 10/23/23   Silver Beltran MD   dextroamphetamine-amphetamine (ADDERALL XR) 20 MG 24 hr capsule Take 1 capsule (20 mg total) by mouth every morning. 11/24/23   Silver Beltran MD   dextroamphetamine-amphetamine 10 mg Tab Take 1 tablet (10 mg total) by mouth once daily. 8/14/23   Silver Beltran MD   FLUoxetine 20 MG capsule Take 3 capsules (60 mg total) by mouth once daily. 10/23/23 1/21/24  Silver Beltran MD   ketoconazole (NIZORAL) 2 % shampoo Apply topically 3 (three) times a week. 6/5/19   Francois Larry MD   metFORMIN (GLUCOPHAGE-XR) 500 MG ER 24hr tablet Take 1 tablet (500 mg total) by mouth once daily for 14 days, THEN 1 tablet (500 mg total) 2 (two) times daily with meals for 14 days. 8/16/23 11/7/23  Cora Ramirez MD   topiramate (TOPAMAX) 25 MG tablet Take 1 tablet (25 mg total) by mouth once daily.  Patient not taking: Reported on 11/7/2023 10/23/23   Silver Beltran MD   ziprasidone (GEODON) 80 MG capsule Take 1 capsule (80 mg total) by mouth once daily. 10/23/23   Silver Beltran MD        Vitamins, Minerals, and/or Supplements:  Started recently  taking iron after lunch    Social History    Marital status:  Single    Social History     Tobacco Use    Smoking status: Never     Passive exposure: Never    Smokeless tobacco: Never   Substance Use Topics    Alcohol use: No       Labs   Reviewed and noted    LIFESTYLE FACTORS    Sleep: fair  Hours: 6-7  Wake: 8-9 am  Sleep: 11 pm - 12 am    Stress Level: not discussed  Stress management: n/a    Support System:   family    Physical Activity: Sedentary (little or no exercise)  Types of activity: about a mile most days morning or  afternoon  Frequency of activity: most days    Dining out: 1-2 x per week, mostly fast food  Types of restaurants/foods: C[employer] chicken sandwich with fries and mac n' cheese    Frequency of consumption of fried foods: weekly    Meal preparation/shopping: mom and dad    Food Allergies or Intolerances:  NKFAI      Beverages  Water: bottles (16 ounces) 4-5 a day  Alcohol: none  Coffee: creamer sometimes sugar  Energy Drinks: Monster (white), sugar free redbull  Tea: occasional  Soda: Coke Zero  Milk: Fairlife   Juice: none  Other: none    24-hour Recall  Breakfast: skips // biscuits & gravy with sausage sometimes with hashbrown and eggs (2)  AM Snack: Protein bar (quest)  Lunch: 1-2 pm CFA fried chicken sandwich + mac n' cheese + fries + diet coke   PM Snack See above  Dinner: 5-6 pm Chicken Stir Herman with Rice  Desserts/Snacks: Chicken nuggets 10-12    Diagnosis    Excessive energy intake related to large portions of food as evidenced by patient report.    Intervention    Estimated Energy Requirements:   Calories: 2400 - 2600  Protein: 180 grams   Carbohydrates: 240 grams   Fat: 80 grams   Baseline for fluids: 128 ounces + sweat loss    Written Materials Provided  Meal Planning Guide with recommendations  Fueling Well On-the-Go Food Guide  Healthy Eating Plate  RD contact information    Goals:  Eat every 3-4 hours focusing on protein + fiber  Increase walking to 2 times per day at least 3-4 days  per week  For a snack late at night eat 5-6 chicken nuggets + a piece of fruit or Carb Balance Tortilla    Monitoring/Evaluation    Monitor the following:  Weight  Sleep  Stress Management  Movement    Communicated with healthcare provider and documented plan for referral to appropriate agency/healthcare provider as needed    Supervising Physician: Cora Ramirez MD    Patient motivation, anticipated barriers, expected compliance: Patient is motivated and has verbalized understanding and intent to comply.     Comprehension: good     Follow-up: 4 weeks

## 2023-12-06 NOTE — PATIENT INSTRUCTIONS
Name: Herminio Turner  Date: 12/06/2023  NUTRITION RECOMMENDATIONS    Goals:  Eat every 3-4 hours focusing on protein + fiber.  Increase walking to 2 times per day at least 3-4 days per week.  For a snack late at night eat 5-6 chicken nuggets + a piece of fruit or Carb Balance Tortilla.    Additional Recommendations  Limit added sugars to < 25g per day.  Hydration: At least 1 gallon (128 ounces or 8 16-ounce bottles) per day  5 Ingredient Healthy Cooking Cookbook - my favorite cookbook for beginners  Reminder to pick out lean protein in the grocery store look for the product to have more protein than fat    Chick-syed-A orders:  Grilled chicken sandwich + mac n' cheese + fruit cup + diet coke  12 count grilled nuggets + fries + fruit cup +diet lemonade  Physical activity guidelines:  At least 150 minutes of moderate-intensity cardiovascular exercise per week  This averages out to 30-45 minutes most days of being able to respond to questions but only a few words at a time  At least 2 days per week of muscle strengthening activities  Can be body weight or with weights  Goal is to hit every major muscle group at lest one time per week for muscle maintenance, if you want to add muscle you will need to increase to more than 1-2 times per week  Exercise jamaal and website recommendations:  Street Parking - have an iPhone jamaal or can use website, typically <30 minutes and higher intensity. They have a free On Ramp program on their YouTube that is designed to help you learn how to do the movements and prepare for longer and more intense activity.   I would do this as you are able to and take additional rest as needed.   Also, you dad may be interested in this program as well as they have an ACFT ([employer] fitness test) training specific program.  Noelle Strong  - jamaal traditional weightlifting programming  Moves  - jamaal  [employer]  - jamaal  YouTube: these are a mixture of dancing or weight lifting channels  Lavell SHEPHERD  Fitness   Yoga with Beth Gaviria   Body Project   Move with Roxana    Macronutrient Servings Totals  Refer to the Meal Planning Guide for serving sizes  Calories Protein Carb Fat   2400 to 2600 Servings Servings Servings    18 to 20 10 to 13 8 to 9    Grams Grams Grams    180 to 195 200 to 240 80 to 87     Example 3 Meals / 2 Snacks per Day  Meal: Breakfast  9:00 am Lunch  1:00 pm Snack  3:00 pm Dinner  5:30 pm Snack  8:30 pm   Protein 4 to 5 4 to 5 1 to 2 5 to 6 1 to 2   Carb: 2 to 3 2 to 3 0 to 1.5 2 to 3 0 to 2   Fat: 1 to 2.5 1 to 2.5 0 to 1 1 to 2.5 0 to 1       Breakfast Ideas  400-600 calories, at least 30g protein  Idea #1:  515 calories, 37g protein, 47g carbohydrate, 28g fat  2 hard boiled eggs  2 Veggies Made Great cinnamon roll muffins  3 Central Chicken or Yong Syd Turkey breakfast sausage links  1 cup fresh strawberries  Idea #2:  510 calories, 39g protein, 46g carbohydrate, 23g fat  Yogurt Parfait + 1 hard boiled egg  1 cup Oikos Triple Zero Greek yogurt, any flavor  1 cup mixed berries  ½ cup Ratio Cereal or other low sugar cereal (see brand recommendations at end of document)  2 tbsp chopped nuts  1 tbsp gabrielle seeds  Idea #3:   585 calories, 46g protein, 54g carbohydrate, 22g fat  2 eggs + 2 egg whites  2 Liberty cake buttermilk & vanilla waffles  ½ cup low sugar vanilla Greek yogurt  ½ cup fresh blueberries  2-4 tbsp sugar-free syrup  Idea #4:  500 calories, 42g protein, 36g carbohydrate, 32g fat  Meal Prep Breakfast Burritos 2 burritos    Idea #5:  575 calories, 27g protein, 55g carbohydrate, 25g fat  [facility] Buttermilk Biscuits - 1 biscuit  2 eggs - cooked with olive oil spray  Healthy Sausage Gravy - ¼ to ½ cup   1 cup strawberries    Idea #6:  345 calories, 42g protein, 24g carbohydrate, 9g fat  Basic Protein Smoothie  1 scoop protein powder  1 cup frozen fruit  ½ cup frozen cauliflower rice  8 ounces Fairlife 2% milk  1 tablespoon gabrielle seeds  Optional  add-ins: peanut butter or PB2, ground flax seed, coconut flakes, sugar-free pudding mix, handful baby spinach, ½ cup pickled beets  Idea #7:  415 calories, 25g protein, 37g carbohydrate, 19g fat  Breakfast Fontana  Whole wheat English muffin  1 egg  2 slices center cut butts or ham or turkey deli meat  1 slice cheese  1 cup cantaloupe    Idea #8:  450 calories, 36g protein, 41g carbs, 20g fat  1 cup Ratio cereal  1 cup Fairlife 2% milk  ½ cup frozen blueberries  3 Yong Syd turkey sausage links    Idea #9  varies  Protein bar or ready-to-drink shake (see brand recommendations at end of document)  1 piece of fruit      Lunch Ideas  400-600 calories, at least 30g protein    Idea #1:  varies  Any salad kit + packet or small can of tuna/salmon or 4-5 ounces of rotisserie chicken (skin pulled off)  Idea #2:  varies  Building A Better-For-You Salad   Start with a base of fresh greens: arugula, spinach, butter lettuce, mixed greens, kale, santiago  Add at least 2-3 veggies  Raw veggies add a nice crunch  Grilled or roasted veggies add a nice charred, caramelized flavor  Pickled veggies are great for adding a hint of sweet/sour flavor, while fermented veggies give you added probiotic benefits  Add a lean protein (4-5 ounces)  Grilled chicken, turkey, tuna/salmon packet, shrimp, tofu  Add high fiber carbs (~1/2-2/3 cup)  Beans & quinoa   Grains: whole grain, chickpea pasta, farro, bulgar  Starchy veggies: potatoes, winter squash, corn  Fruit: berries, grapes, chopped apples, etc.  Add texture + flavor: choose 1-2, up to ~ 2-3 tablespoons of each item  Avocado  Cheese  Hummus   Nuts   seeds  Dress it up  Choose light/low-fat options: Mackey's Own Light dressings and [place] Farms are great options  Make your own: oil + vinegar + seasonings + a little bit of Víctor mustard  Homemade Salad Dressing Recipes    Idea #3:  Fontana only: 388 calories, 32g protein, 34g carbohydrate, 14g fat  Build a better sandwich:   2 slices  whole wheat braed   4 ounces deli meat  1 slice cheese or ¼ sliced avocado  unlimited non-starchy vegetables such as lettuce, tomato, onion, pickle, etc.  1 tablespoons sauce (olive oil ramírez, [place] ranch, lite honey mustard, BBQ sauce, hummus), unlimited yellow or brown mustard  Side ideas, choose one:  1 cup of raw non-starchy vegetables (baby carrots, sugar snap peas, cucumbers, etc.) + 2 tbsp [place] ranch dressing  ½ cup cooked non-starchy vegetables  1 cup strawberries + 2 tbsp chocolate hummus  1 bag Sun Chip / Baked Lays / Pop Chips / Pop Corners  Idea #4:  495 calories, 24g protein, 48g carbohydrate, 23g fat  Street Corn Chicken Salad serve on lettuce wraps or in a carb smart tortilla with Yarsani Rice Crisps (mini rice cakes) and salsa on the side  Idea #5:  Snack Box Lunch  Option 1:  514 calories, 39g protein, 52g carbohydrate, 18g fat  2 boiled eggs  2 ounces deli meat  1 bell pepper  15 mini pretzels  1 cup grapes  2 Light string cheese  Option 2:   470 calories, 40g protein,52g carbohydrate, 17g fat  1 serving Nut Thins  2 Light string cheese  1 hard boiled egg  Tuna pack, any flavor  1 cup chopped strawberries  Fiber one brownie bar  ½ cup blueberries  Option 3:  561 calories, 40g protein, 69g carbohydrate, 15g fat  1 ounce pretzel crisps  1 apple sliced  3 slices ultra thin provolone cheese  3 ounces rotisserie chicken  4 tbsp powdered peanut butter mixed with water (see directions)  ½ cup blackberries  Option 4:   446 calories, 36g protein, 26g carbohydrate, 22g fat  2 tbsp hummus  3 ounces baby carrots  2 hard boiled eggs  ½ cup grapes  2 ounces deli turkey wrapped around 2 low fat string cheese  Idea #6:  578 calories, 40g protein, 57g carbs, 25g fat  Italian Pasta Salad  Serves 6  1 box Banza Rotini  4 cups chopped broccoli  1.5 cups chopped cherry tomatoes  1.5 cups chopped cucumber  4 ounces part-skim mozzarella shredded  4 ounces turkey pepperoni  1 jar pepperoncinis sliced  ½ cup chopped  yellow onion  ¼ cup sliced black olives  4 links Good & Gather Smoked Chicken Sausage sliced  Dressing: ¼ cup olive oil, ¼ cup red wine vinegar, 2 tbsp juice from pepperoncinis, salt, pepper, and garlic to taste  Dinner Ideas  500-600 calories, at least 30g protein  Idea #1:  Varies: example: 5 ounces chicken breast, 1 cup asparagus, 1 cup roasted sweet potato  560 calories, 54g protein, 63g carbs, 11g fat  4-6 ounces of baked or broiled fish, seafood, or lean meat cooked with spray olive oil  1 cup or more of cooked non-starchy vegetables  2-3 servings of carbohydrates from the Meal Planning Guide Book  Idea #2:  Varies: made with 1 cup quinoa, 4 ounce sirloin steak, 1 cup chopped broccoli, and 1 tablespoon Lemon Basil Vinaigrette 500 calories, 46g protein, 42g carbohydrate, 16g fat  Build a grain/power bowl:   Choose a starch (1-3 servings feel free to mix/match)  Brown rice, farro, quinoa, millet, barley, couscous  White or sweet potatoes  [place] squash  Choose a protein (4-5 ounces)  [place]ank, steak (lean cut), chicken, shrimp, edamame, tofu  Vegetables (unlimited amounts)  Try them roasted, steamed, or grilled for variety  Eggplant, broccoli, carrots, bell peppers, green beans, radishes, cucumbers, parsnips, lettuce, cabbage  Dressing/sauce  Vinaigrette, yogurt, warm broth, hot sauce, soy sauce, chimichurri, pesto, or any combinations!  Garnish (optional)  Fresh herbs, micro greens, nuts, seeds, toasted coconut  Note that nuts/seeds/coconut contain a lot of calories and should be used sparingly  Idea #3:  545 calories, 50g protein, 41g carbs, 28g fat  Tacos  2 carb balance tortillas  ¼ cup reduced fat taco cheese  2 tbsp sour cream  3-4 ounces lean (93/7) ground beef or turkey or rotisserie chicken  Idea #4:  412 calories, 35g protein, 41g carbohydrate, 12g fat  Better-for-you pizza  2-Ingredient High Protein Dough, (or use a whole wheat basil) + ¼ cup red sauce + 1/3 cup cheese + 2-4 ounces of protein (rotisserie  chicken, turkey pepperoni, sliced grilled chicken/steak) + veggies of choice  Serve with at least 1 cup of non-starchy vegetables or side salad  Idea #5:  Varies, made with 93/7 ground turkey and marinara and steamed broccoli  600 calories, 50g protein, 73g carbohydrate, 18g fat  Better-for-you pasta  1.5 cups cooked Banza/Whole Wheat pasta + 3-4 ounces lean protein + ¼ cup nick sauce or ½ cup red sauce or 1-2 tablespoons pesto   Serve with 1-2 cups non-starchy vegetables    Snack Ideas  Create a hunger crushing combo with protein, fiber, and fat! Choose two of the three to create a satisfying and filling snack any time of the day!  6 chicken nuggets + 1-2 Carb Balance Tortillas  6 chicken nuggets + 1 medium apple  Oikos Pro 20g Greek Yogurt + piece of fruit  1 portion bag of nuts / ¼ cup + piece of fruit  Piece of fruit + 1-2 tablespoons no sugar added nut butter  Protein bar  Look for at least 15 grams of protein  Limit to 1 serving carbohydrates (15 grams)  Ready to drink protein shake   At least 25 grams protein  Bag of Skinny Pop or Smart Food popcorn + 2 low-fat cheese stick/babybel   ½ cup low-fat cottage cheese + ¼ cup freeze dried fruit  Turkey rollup: 1 carb balance tortilla + 3 oz turkey + 1 tablespoon olive oil ramírez  3 oz can or packet of tuna + 1 tablespoon olive oil ramírez with 1 serving of Triscuit Thin Crisps or Nut Thins  Bare Apple Chips + 2 low-fat string cheese  1-2 cups raw vegetables + 1 portion container guacamole or 2-3 tbsp Greek yogurt ranch or 2-3 tbsp hummus  1-2 Chomps Sticks + 1 piece fruit  1 serving Nut Thins + 2 light babybel cheese or 2-3 laughing cow cheese wedges  Sweet Treats  Protein shake  ½ cup berries + a dollop of whipped cream  Outshine Frozen Fruit Bars (2-3 if wanted)  Chocolate Dipped Banana Bites  Raspberry Lemon Greek Frozen Yogurt Bark  1 cup strawberries with 2-3 tbsp chocolate hummus  Any smoothie under 200 calories  Sugar Free Pudding or Jello  5.3 ounce 0% Fage  yogurt mixed with 1-2 tablespoons sugar-free pudding mix  1 small container low-sugar vanilla Greek yogurt + 1 tbsp mini semi-sweet chocolate chips  All the Best Banana Nice Cream Recipes   Any sweet treat under 200 calories (think mini/fun-sized)    Favorite Websites for Recipe Inspiration  Skinnytaste  Real Food Dietitians   Eating Well  Budget Bytes  Eating Bird Food  Fit Foodie Finds  Oh Snap Macros   35 Macro Friendly Meal Prep Recipes   9 Meal Prep Success Tips       Additional Recipe Ideas:  Breakfast  Oats Overnight + 6 ounces Fairlife 2% milk   Breakfast Toast Ideas + Protein Shake  Meal Prep Breakfast Options:  Walsh Chicken Egg Cups  Ham & Broccoli Breakfast Casserole  Bagel Ham and Cheese Quiche  Blueberry Protein Pancakes  English Muffin Breakfast Bake     Raspberry Fiber Smoothie (makes 1 smoothie)  1 cup frozen raspberries  1 cup unsweetened almond milk  2/3 cup nonfat vanilla Greek yogurt (or 5.3 oz cup) or 1 scoop protein powder  ½ cup frozen cauliflower (florets or riced)  1 tbsp almond butter  1 tbsp old fashioned oats  1 tbsp gabrielle seeds  ½ tbsp honey  PB&J Smoothie Bowl  ¼ cup milk of your choice  2/3 cup frozen blueberries  2/3 cup sliced strawberries, frozen  1 scoop vanilla protein powder  1 tbsp peanut butter  Optional toppings: 1 tbsp melted peanut butter, 1-2 tbsp granola, gabrielle seeds, blue berries  Spinach Avocado Smoothie  1 cup nonfat plain Greek yogurt  1 cup fresh spinach  1 frozen banana  ¼ avocado  2 tbsp water  1 tsp honey  Lunch/Dinner  Chicken Enchiladas - great for freezing serving with a side salad or at least 1 cup non-starchy vegetables  Ground Turkey Skillet with Zucchini, Corn, Black Beans, and Tomato great by itself or serve over 1 cup cauliflower rice or 1/3 cup brown rice    Air Fryer Chicken Thighs with Air Fryer Frozen Broccoli & 1 cup cubed roasted potatoes  BBQ Grilled chicken breast with Brown Sugar Baked Beans + 1 cup non-starchy vegetables   Juicy Chicken Breast 6  Ways with 2-3 servings carbohydrates (from Meal Planning Guide) + at least 1 cup non-starchy vegetables  30 Sheet Pan Dinners    Chicken Fajita Kabobs serve with brown rice, quinoa, or carb smart tortillas + additional non-starchy vegetables  Pasta Primavera add in additional protein such as shrimp, sliced steak, ground beef, or rotisserie chicken    Sheet Pan Turkey Meatloaf + Broccoli serve with 1 whole baked potato and ¼ cup reduced fat cheese  Ranch Chicken Meal Prep  Calorie Swaps  Sweet Baby Rays BBQ sauce --> Sweet Baby Rays Zero Sugar BBQ Sauce  Greek Gods Honey Vanilla Greek Yogurt --> Oikos Triple Zero Vanilla Greek Yogurt  Ice cream Bates --> Skinny Cow No Sugar Added Ice Cream Bar  Butter --> [place] Butter with Light Butter  Rice --> 50/50 mixture of rice & cauliflower rice  Cream Cheese --> Whipped Cream Cheese  Sliced cheese --> ultra-thin sliced cheese  Abdalla/Sour Cream --> Fage 0% Greek Yogurt  Syrup --> Lite version  Strawberry jam --> sugar free preserves  Tortillas --> corn tortillas or carb smart tortillas  Ranch --> [place] Ranch  Vinaigrettes --> Marbella's Garlic & Red Wine Vinaigrette  Coffee syrups --> Sugar free options  Pasta --> Spaghetti Squash  Abdalla --> avocado oil/olive oil abdalla  Avocado --> portion control smashed avocado cups  Restaurant Tips   Pick 1 out of the 4 Rule: Instead of eating bread/tortilla chips, an appetizer, alcoholic drink and dessert, choose just one to have with your entrée   Focus on lean proteins: Refer to lean meat/meat substitutes page in meal planning guidebook. Select items grilled, baked, broiled, braised, poached or roasted   For your heart health, avoid crispy, crunchy, breaded, paneed or stuffed items and items that are cream based, au gratin or buttered   Order sauces, dressings, and gravies on the side. This way you can add 1-2 tablespoons yourself. This helps with portion control    Request extra non-starchy vegetables instead of a  starchy side dish. If the starchy side is something you love, consider splitting it with someone else at the table   Beverages: Order water with lemon, sparkling water, or unsweetened tea. Avoid sugary soft drinks, juices and mixers   Deep fried foods: Enjoy no more than 2x/month    Favorite Brands  Protein Bars/Shakes/Powders  Bars  RX Bars  Fit Crunch  THINK  Quest  One  Shakes  Core Power Shakes (26g or 42g protein)  Fairlife Nutrition Plan (30g protein)   Ensure Max Protein (30g protein)  Premier Protein Shakes (30g protein)  Boost High Protein (20g protein)   Muscle Milk Genuine Protein Shakes (25g protein)   Quest Protein Shakes (30g protein)   Orgain Protein Shakes (20-26g protein)   Powders  Ghost  Optimum Nutrition   Muscle Milk  Garden of Life  Baron   Naked   Pasta/Rice  Banza Chickpea Pasta  Barilla Whole Wheat  Parish Rice  Success Boil in Bag Brown Rice  Bread  Day[place]'s Killer Bread thin sliced  Nature's Own 100% Whole Grain Sugar-Free  Orowheat Whole Wheat Small Slice  Marisabel Valenzuela Delightful White Whole Wheat  [place] Carb Balance Tortillas  Ole Extreme Wellness Tortillas  Flatout Wraps  [place] Banderita Carb Counter Tortillas  Toufayan Keto Wraps  Dairy  Chobani less sugar  Dannon Light and Fit Yogurt  Fage 2% plain  Oikos Triple Zero  [facility] 2% Milk  Ripple Milk, unsweetened(milk alternative)  Soy Milk, Vanilla Unsweetened  Good Culture Low-Fat Cottage Cheese  Dressings/Sauces  [facility] Greek Yogurt Ranch   Sweet Baby Ray's BBQ Sauce - no added sugar  Any ketchup with no added/less added sugar  Milli or Sulaiman or Primal Kitchen  G Guaman Sugar Free Sauces   Mackey's Own Dressings   Marbella's Dressings   Cereal  Kashi GO!  Ratio  Shredded Wheat  Fiber One  Grape Nuts  Special K Protein  Snacks  Nature's Garden Trail Mix Snack Packs  Pop Chips (like healthier Lays)  Pop Corners (like healthier Doritos)  Skinny Pop Popcorn  Lesser Evil Popcorn  Beanitos Chips  Judaism Oats Rice Crisps   Late July Robert & Quinoa  Tortilla Chips  Great Value Zero Sugar Beef Jerky (be mindful of sodium)  Chomps Meat Sticks        [place] Eat Fit    Designed to take the guesswork out of dining out healthfully, [place] [employer] Fit makes the healthy choice the easy choice   Visit [email or web address]    Download the [facility] Eat Fit jamaal for free on your smartphone   Lists of all Eat Fit restaurant partners & dishes by location   Nutrition facts included for all Eat Fit dishes   200+ Eat Fit approved recipes   Eat Fit shopping guide + community wellness resources   Eat Fit Cookbook   Craft: The Eat Fit Guide to Zero Proof Cocktails     K.I.S.S. (Keep It Simple Silly)  Pick 2-3 non-starchy vegetables  Rotate during the week having them for lunches and dinners.  Pick 1-2 different types of meats  Use different sauces   BBQ  Munger  Teriyaki  Pesto  Salsa  Seasonings  Slap Ya Mama  Lemon Pepper  Everything but the AnTuTu's Chipotle Cherry  Pick 2 different starches to have during the week  For example:  Potatoes  sweet potatoes  Corn  butternut squash  Banza chickpea pasta  brown rice  Keep breakfast simple, have two options and stick with those two options the whole week  For example, making Overnight Oats with a serving of fruit for 3 breakfasts and eat 2 Healthy Egg Muffin Cups with a slice of whole grain toast and 1 serving fruit for the other 4 breakfasts    For lunch/dinner you could do something simple like the following: This would allow you to not waste a lot of ingredients but have a wide variety of flavors that you could mix match all week.  Chicken thighs 3 ways:   BBQ Sauce  Lemon Pepper Seasoning  Ranch Seasoning  Potatoes 2 ways:   cubed and roasted  mashed with low-fat milk, Greek yogurt (instead of butter or sour cream) and low-fat cheese  Brown rice - made in chicken stock, low sodium (gives it extra flavor)  Frozen Broccoli with ranch seasoning, microwaved  Frozen broccoli roasted, sprinkled with lemon juice and 1 tbsp  parmesan  Green beans with sliced mushrooms  Green beans sauteed with garlic  15-Minute Meals  Two slices whole wheat bread + frozen turkey burger + microwave broccoli  Chickpea pasta + jar tomato sauce + chicken sausage + side salad kit  Microwave rice + rotisserie chicken + frozen veggie mix + teriyaki sauce + chopped peanuts  Mike grilled chicken rotisserie or blackened grilled chicken tenders + frozen garlic bread + baby carrots + hummus  Stir Herman Chicken - make with Banza pasta + add in double vegetables   Shrimp + BBQ sauce + canned corn + canned beans + microwave vegetables  Can tuna/salmon/chicken + 2 tablespoons Greek yogurt/avocado oil ramírez + 1 serving Nut Thins + apple + cheese stick

## 2023-12-19 ENCOUNTER — TELEPHONE (OUTPATIENT)
Dept: PSYCHIATRY | Facility: CLINIC | Age: 23
End: 2023-12-19
Payer: OTHER GOVERNMENT

## 2023-12-21 ENCOUNTER — OFFICE VISIT (OUTPATIENT)
Dept: INTERNAL MEDICINE | Facility: CLINIC | Age: 23
End: 2023-12-21
Payer: OTHER GOVERNMENT

## 2023-12-21 ENCOUNTER — OFFICE VISIT (OUTPATIENT)
Dept: PSYCHIATRY | Facility: CLINIC | Age: 23
End: 2023-12-21
Payer: OTHER GOVERNMENT

## 2023-12-21 ENCOUNTER — TELEPHONE (OUTPATIENT)
Dept: PSYCHIATRY | Facility: CLINIC | Age: 23
End: 2023-12-21
Payer: OTHER GOVERNMENT

## 2023-12-21 VITALS
DIASTOLIC BLOOD PRESSURE: 80 MMHG | WEIGHT: 315 LBS | TEMPERATURE: 97 F | BODY MASS INDEX: 48.52 KG/M2 | OXYGEN SATURATION: 98 % | SYSTOLIC BLOOD PRESSURE: 126 MMHG | HEART RATE: 114 BPM

## 2023-12-21 VITALS — SYSTOLIC BLOOD PRESSURE: 158 MMHG | HEART RATE: 144 BPM | DIASTOLIC BLOOD PRESSURE: 82 MMHG

## 2023-12-21 DIAGNOSIS — F50.81 BINGE EATING DISORDER: ICD-10-CM

## 2023-12-21 DIAGNOSIS — F31.9 BIPOLAR AFFECTIVE DISORDER, REMISSION STATUS UNSPECIFIED: Primary | ICD-10-CM

## 2023-12-21 DIAGNOSIS — F41.9 ANXIETY DISORDER, UNSPECIFIED TYPE: ICD-10-CM

## 2023-12-21 DIAGNOSIS — F84.0 AUTISM DISORDER: ICD-10-CM

## 2023-12-21 DIAGNOSIS — D50.9 IRON DEFICIENCY ANEMIA, UNSPECIFIED IRON DEFICIENCY ANEMIA TYPE: Primary | ICD-10-CM

## 2023-12-21 DIAGNOSIS — F90.9 ATTENTION DEFICIT DISORDER WITH HYPERACTIVITY: ICD-10-CM

## 2023-12-21 DIAGNOSIS — K76.0 NAFLD (NONALCOHOLIC FATTY LIVER DISEASE): ICD-10-CM

## 2023-12-21 PROCEDURE — 99214 PR OFFICE/OUTPT VISIT, EST, LEVL IV, 30-39 MIN: ICD-10-PCS | Mod: S$PBB,,, | Performed by: NURSE PRACTITIONER

## 2023-12-21 PROCEDURE — 99214 OFFICE O/P EST MOD 30 MIN: CPT | Mod: S$PBB,,, | Performed by: NURSE PRACTITIONER

## 2023-12-21 PROCEDURE — 99214 PR OFFICE/OUTPT VISIT, EST, LEVL IV, 30-39 MIN: ICD-10-PCS | Mod: S$PBB,,, | Performed by: PSYCHIATRY & NEUROLOGY

## 2023-12-21 PROCEDURE — 99212 OFFICE O/P EST SF 10 MIN: CPT | Mod: PBBFAC,27 | Performed by: PSYCHIATRY & NEUROLOGY

## 2023-12-21 PROCEDURE — 99999 PR PBB SHADOW E&M-EST. PATIENT-LVL IV: ICD-10-PCS | Mod: PBBFAC,,, | Performed by: NURSE PRACTITIONER

## 2023-12-21 PROCEDURE — 99999 PR PBB SHADOW E&M-EST. PATIENT-LVL II: ICD-10-PCS | Mod: PBBFAC,AF,HB, | Performed by: PSYCHIATRY & NEUROLOGY

## 2023-12-21 PROCEDURE — 99999 PR PBB SHADOW E&M-EST. PATIENT-LVL II: CPT | Mod: PBBFAC,AF,HB, | Performed by: PSYCHIATRY & NEUROLOGY

## 2023-12-21 PROCEDURE — 99214 OFFICE O/P EST MOD 30 MIN: CPT | Mod: PBBFAC,PO | Performed by: NURSE PRACTITIONER

## 2023-12-21 PROCEDURE — 99999 PR PBB SHADOW E&M-EST. PATIENT-LVL IV: CPT | Mod: PBBFAC,,, | Performed by: NURSE PRACTITIONER

## 2023-12-21 PROCEDURE — 99214 OFFICE O/P EST MOD 30 MIN: CPT | Mod: S$PBB,,, | Performed by: PSYCHIATRY & NEUROLOGY

## 2023-12-21 RX ORDER — ZIPRASIDONE HYDROCHLORIDE 80 MG/1
80 CAPSULE ORAL DAILY
Qty: 90 CAPSULE | Refills: 0 | Status: SHIPPED | OUTPATIENT
Start: 2023-12-21 | End: 2024-01-30 | Stop reason: SDUPTHER

## 2023-12-21 RX ORDER — FLUOXETINE HYDROCHLORIDE 20 MG/1
60 CAPSULE ORAL DAILY
Qty: 270 CAPSULE | Refills: 0 | Status: SHIPPED | OUTPATIENT
Start: 2023-12-21 | End: 2024-01-30

## 2023-12-21 RX ORDER — IRON,CARBONYL/ASCORBIC ACID 100-250 MG
1 TABLET ORAL DAILY
Qty: 90 TABLET | Refills: 3 | Status: SHIPPED | OUTPATIENT
Start: 2023-12-21

## 2023-12-21 RX ORDER — DEXTROAMPHETAMINE SACCHARATE, AMPHETAMINE ASPARTATE MONOHYDRATE, DEXTROAMPHETAMINE SULFATE AND AMPHETAMINE SULFATE 5; 5; 5; 5 MG/1; MG/1; MG/1; MG/1
20 CAPSULE, EXTENDED RELEASE ORAL EVERY MORNING
Qty: 30 CAPSULE | Refills: 0 | Status: SHIPPED | OUTPATIENT
Start: 2023-12-21 | End: 2024-01-30 | Stop reason: SDUPTHER

## 2023-12-21 RX ORDER — DEXTROAMPHETAMINE SACCHARATE, AMPHETAMINE ASPARTATE MONOHYDRATE, DEXTROAMPHETAMINE SULFATE AND AMPHETAMINE SULFATE 5; 5; 5; 5 MG/1; MG/1; MG/1; MG/1
20 CAPSULE, EXTENDED RELEASE ORAL EVERY MORNING
Qty: 30 CAPSULE | Refills: 0 | Status: SHIPPED | OUTPATIENT
Start: 2023-12-21 | End: 2024-04-01 | Stop reason: ALTCHOICE

## 2023-12-21 NOTE — PROGRESS NOTES
"    Herminio Turner   2000 12/21/2023        CURRENT PRESENTATION:   The patient presents for follow-up of bipolar disorder, anxiety, ADHD, autism, and concerns regarding obesity.  When he was last seen 2 months ago, the plan was:  Continue Geodon 80 mg daily, Prozac 60 mg daily, Adderall XR 20 mg daily (he reports that he and no longer needed Adderall IR 10 mg).  Begin Topamax 25 mg daily.  Subsequent to the appointment, the patient called regarding side effects with Topamax, and the medication was discontinued.     indicates that 30 capsules of Adderall XR 20 mg were last filled on November 24 and 30 tablets of Adderall IR 10 mg were last filled on August 16.    In the current session, the patient presents with his father at his (the patient's) request and with his consent.  He reports times of 1-2 days of mildly depressed feelings about twice per month, during which he has increased sleep and decreased energy, interest, and activity.  He reports passive thoughts of death but no thoughts of self-harm whatsoever and reports that he is consistently confident of maintaining his safety and consistently aware of protective factors (his family, others).  No intervening elevated moods, irritable moods, or manic signs or symptoms.  Never with psychosis, feelings of aggression, or thoughts of harm towards others.  He reports anxiety is well controlled.  Focusing well.  He reports that sleep is 7-9 hours, going somewhere between 11:00 p.m. and 2:00 a.m. when he is not experiencing 1 of his depressed days.  Regarding the depression, he indicates that these occur randomly," without any precipitant and with consistent adherence to Geodon and Prozac.  The patient himself does not feel that any medication changes are needed in that there is no needed intervention for the occasional days of decreased mood.    The patient's father confirms that the patient has been doing well overall with regards to his mood.  The " father's to concerns are impulsivity.  His father says that if he wants something (usually and online item related to technology that the patient is interested in), he will fixate on the item and ask for it repeatedly, without anger or agitation.  His father is also concerned about difficulty losing weight.  His father does not wish for any current medication changes as they are going on a family trip to [place] in the coming days.      Although the patient has no history of seizure disorders, Wellbutrin may not be an advisable medication for the patient for weight issues as he meets criteria for binge eating, with questioning.  Almost nightly, he engages in a discrete period of time in which she eats a large amount of snack food, with the sense that he can not control the eating, eating until he is uncomfortably full, feeling embarrassed by the behavior and doing so after everyone else is asleep, feeling guilty about the eating, feeling distressed about the behavior; no bingeing and no restricting.  This has gone on for many years.      The binge eating is 1 element of the patient's weight difficulty.  Standpoint of medications, his weight has been relatively stable since the change to Geodon.  The possibility of Prozac over time causing weight gain was discussed.  The patient, his father, and I devised a plan of my seeing him next month to adjust medications, continuing Geodon, beginning weaning Prozac, and changing Adderall to Vyvanse.    Interim history:  Living situation/supports:  No change  Last session -  The patient reports that he has been playing video games and has enjoyed buying a few games on day, spending 100 dollars, within his budget and not excessive.  He has started walking again now that the weather is cooler.  He sees his grandparents at least once a week and his aunt next door almost daily.  He reports that he is getting along with his parents and with his 16-year-old sister.   Medical issues:   Seen by hepatology for nonalcoholic fatty liver disease.  The patient was seen by nutrition.  Nonpsychotropic Medications:  None   Allergies:   Review of patient's allergies indicates:   Allergen Reactions    Abilify [aripiprazole] Other (See Comments)     Some impulsive buying (Xbox: Dec 2022) and some wt gain       Mental Status Exam:   Appearance:  Appropriately groomed  Orientation:  X4  Attitude:  Cooperative, engaged   Eye Contact:  Appropriate  Behavior:  Calm, appropriate  Speech:     Rate - WNL    Volume - WNL    Quantity - WNL    Tone - relaxed, appropriate  Pressure - no  Thought Processes:  Goal-directed  Mood:  Minimal depression as above   Affect:  Without distress, euthymic, including ability to brighten at appropriate times  SI:  No, and no thoughts of self-harm  HI:  No, and no thoughts of harm towards others  Paranoia:  No  Delusions:  No  Hallucinations:  No  Attention:  Intact over the course of the session  Cognition:  No deficits noted over the course of the session  Insight:  Intact   Judgment:  Intact  Impulse Control:  Some impairment as above          ASSESSMENT:   Encounter Diagnoses   Name Primary?    Bipolar affective disorder, remission status unspecified Yes    Attention deficit disorder with hyperactivity     Anxiety disorder, unspecified type     Autism disorder     Binge eating disorder          PLAN:     Follow up next month.      Psychiatry Medication:  Continue Geodon, Prozac, and Adderall for now.  Adjustments to the regimen next month as noted above.    Reviewed with patient:  Report side effects or any other problems to the psychiatrist during clinic business hours.  Call 911 or go to an emergency department for any acute or urgent issues otherwise.  Follow up with primary care/MD specialist for continued monitoring of general health and wellness and any medical conditions.  Call  [facility] at [phone] or go to [facility]  My Chart if necessary for scheduling  or rescheduling.  Understanding was expressed; and no further concerns or questions were raised at this time.     [address]  2 or more stable chronic illnesses and Prescription drug management    Large portions of this note were completed by way of voice recognition dictation software, and transcription errors are possible, such that specific information in the note should be considered in the context of the entire report.

## 2023-12-22 DIAGNOSIS — R63.5 WEIGHT GAIN: ICD-10-CM

## 2023-12-22 DIAGNOSIS — E66.1 CLASS 3 DRUG-INDUCED OBESITY WITH SERIOUS COMORBIDITY AND BODY MASS INDEX (BMI) OF 45.0 TO 49.9 IN ADULT: ICD-10-CM

## 2023-12-23 NOTE — TELEPHONE ENCOUNTER
Care Due:                  Date            Visit Type   Department     Provider  --------------------------------------------------------------------------------                                MYCHART                              ANNUAL                              CHECKUP/PHY  [facility] INTERNAL  Last Visit: 06-      [facility]       [place]  Next Visit: None Scheduled  None         None Found                                                            Last  Test          Frequency    Reason                     Performed    Due Date  --------------------------------------------------------------------------------    HBA1C.......  6 months...  metFORMIN................  03- 08-    [facility] Embedded Care Due Messages. Reference number: [id number].   12/22/2023 9:34:14 PM CST

## 2023-12-25 DIAGNOSIS — R63.5 WEIGHT GAIN: ICD-10-CM

## 2023-12-25 DIAGNOSIS — E66.1 CLASS 3 DRUG-INDUCED OBESITY WITH SERIOUS COMORBIDITY AND BODY MASS INDEX (BMI) OF 45.0 TO 49.9 IN ADULT: ICD-10-CM

## 2023-12-25 NOTE — TELEPHONE ENCOUNTER
No care due was identified.  [facility] Embedded Care Due Messages. Reference number: [id number].   12/25/2023 6:59:41 AM CST

## 2023-12-27 RX ORDER — METFORMIN HYDROCHLORIDE 500 MG/1
TABLET, EXTENDED RELEASE ORAL
Qty: 60 TABLET | Refills: 2 | Status: SHIPPED | OUTPATIENT
Start: 2023-12-27 | End: 2024-03-04

## 2023-12-27 RX ORDER — METFORMIN HYDROCHLORIDE 500 MG/1
TABLET, EXTENDED RELEASE ORAL
Qty: 60 TABLET | Refills: 2 | Status: SHIPPED | OUTPATIENT
Start: 2023-12-27 | End: 2024-04-11

## 2024-01-12 ENCOUNTER — OFFICE VISIT (OUTPATIENT)
Dept: PRIMARY CARE CLINIC | Facility: CLINIC | Age: 24
End: 2024-01-12
Payer: OTHER GOVERNMENT

## 2024-01-12 VITALS
HEART RATE: 104 BPM | OXYGEN SATURATION: 97 % | WEIGHT: 315 LBS | BODY MASS INDEX: 40.43 KG/M2 | RESPIRATION RATE: 18 BRPM | DIASTOLIC BLOOD PRESSURE: 80 MMHG | HEIGHT: 74 IN | TEMPERATURE: 98 F | SYSTOLIC BLOOD PRESSURE: 126 MMHG

## 2024-01-12 DIAGNOSIS — E66.01 CLASS 3 SEVERE OBESITY DUE TO EXCESS CALORIES WITH SERIOUS COMORBIDITY AND BODY MASS INDEX (BMI) OF 45.0 TO 49.9 IN ADULT: ICD-10-CM

## 2024-01-12 DIAGNOSIS — F84.0 AUTISM DISORDER: ICD-10-CM

## 2024-01-12 DIAGNOSIS — F90.9 ATTENTION DEFICIT DISORDER WITH HYPERACTIVITY: ICD-10-CM

## 2024-01-12 DIAGNOSIS — F41.9 ANXIETY DISORDER, UNSPECIFIED TYPE: ICD-10-CM

## 2024-01-12 DIAGNOSIS — R63.5 WEIGHT GAIN: ICD-10-CM

## 2024-01-12 DIAGNOSIS — K76.0 NAFLD (NONALCOHOLIC FATTY LIVER DISEASE): ICD-10-CM

## 2024-01-12 PROCEDURE — 99999 PR PBB SHADOW E&M-EST. PATIENT-LVL IV: CPT | Mod: PBBFAC,,, | Performed by: FAMILY MEDICINE

## 2024-01-12 PROCEDURE — 99214 OFFICE O/P EST MOD 30 MIN: CPT | Mod: S$PBB,,, | Performed by: FAMILY MEDICINE

## 2024-01-12 PROCEDURE — 99214 OFFICE O/P EST MOD 30 MIN: CPT | Mod: PBBFAC | Performed by: FAMILY MEDICINE

## 2024-01-12 NOTE — PROGRESS NOTES
Subjective     Patient ID: Herminio Turner is a 23 y.o. male.    Chief Complaint: Follow-up (Follow up)    Pt reports doing well overall. Saw dietitian; is working on eating better.   Weight up; he attributes to the holiday stressors.   Not walking as much.     Other chronic medical conditions stable. Energy is good.      He is still on geodon. Stopped topamax due to rash.     Some occasional binge eating but overall is eating less at night. Overall he feels has done more positively with this.       HPI       Objective     PAST MEDICAL HISTORY:  Past Medical History:   Diagnosis Date    ADD (attention deficit disorder with hyperactivity) 6/18/2013    Anxiety     Depression          PAST SURGICAL HISTORY:  Past Surgical History:   Procedure Laterality Date    ADENOIDECTOMY      CIRCUMCISION      TONSILLECTOMY      TYMPANOSTOMY TUBE PLACEMENT         FAMILY HISTORY:  Family History   Adopted: Yes   Problem Relation Age of Onset    Crohn's disease Mother     No Known Problems Father           SOCIAL HISTORY:  Social History     Social History Narrative    Not on file       MEDICATIONS:  Medications have been reviewed.    ALLERGIES:  Allergies have been reviewed.    Vitals:    01/12/24 1320   BP: 126/80   Pulse: 104   Resp: 18   Temp: 97.6 °F (36.4 °C)     Wt Readings from Last 10 Encounters:   01/12/24 (!) 173.6 kg (382 lb 11.5 oz)   12/21/23 (!) 171.4 kg (377 lb 13.9 oz)   11/07/23 (!) 168.6 kg (371 lb 11.1 oz)   10/12/23 (!) 166.2 kg (366 lb 6.5 oz)   08/16/23 (!) 162 kg (357 lb 2.3 oz)   07/07/23 (!) 165 kg (363 lb 12.1 oz)   06/05/23 (!) 161.6 kg (356 lb 4.2 oz)   03/01/23 (!) 164.9 kg (363 lb 8.6 oz)   01/19/23 (!) 158.3 kg (349 lb)   12/09/22 (!) 152.4 kg (335 lb 15.7 oz)       Lab Results   Component Value Date    WBC 7.18 11/07/2023    HGB 13.4 (L) 11/07/2023    HCT 41.2 11/07/2023     11/07/2023    CHOL 174 03/03/2023    TRIG 88 03/03/2023    HDL 62 03/03/2023    ALT 65 (H) 11/07/2023    AST 35  11/07/2023     11/07/2023    K 3.7 11/07/2023     11/07/2023    CREATININE 0.6 11/07/2023    BUN 13 11/07/2023    CO2 23 11/07/2023    TSH 3.829 03/03/2023    INR 1.0 11/07/2023    HGBA1C 5.3 03/03/2023       Review of Systems   Constitutional:  Negative for activity change, appetite change, fatigue and unexpected weight change.   HENT:  Negative for mouth dryness.    Eyes:  Negative for visual disturbance.   Respiratory:  Negative for apnea, chest tightness and shortness of breath.    Cardiovascular:  Negative for chest pain.   Gastrointestinal:  Negative for abdominal pain.   Musculoskeletal:  Negative for arthralgias and myalgias.   Integumentary:  Negative for rash.   Allergic/Immunologic: Negative for food allergies.   Neurological:  Negative for tremors, syncope, weakness and memory loss.   Psychiatric/Behavioral:  Negative for behavioral problems, self-injury, sleep disturbance and suicidal ideas. The patient is not nervous/anxious.        Physical Exam  Vitals and nursing note reviewed.   Constitutional:       General: He is not in acute distress.  HENT:      Head: Normocephalic and atraumatic.      Mouth/Throat:      Pharynx: Oropharynx is clear.   Eyes:      General: No scleral icterus.     Pupils: Pupils are equal, round, and reactive to light.   Neck:      Comments: No TM  Cardiovascular:      Rate and Rhythm: Normal rate and regular rhythm.      Pulses: Normal pulses.      Heart sounds: Normal heart sounds. No murmur heard.     No friction rub. No gallop.   Pulmonary:      Effort: Pulmonary effort is normal. No respiratory distress.      Breath sounds: Normal breath sounds. No wheezing, rhonchi or rales.   Abdominal:      General: Bowel sounds are normal. There is no distension.      Palpations: Abdomen is soft.      Tenderness: There is no abdominal tenderness.   Musculoskeletal:         General: No swelling.      Cervical back: Normal range of motion and neck supple. No tenderness.    Lymphadenopathy:      Cervical: No cervical adenopathy.   Skin:     General: Skin is warm.      Findings: No erythema or rash.   Neurological:      Mental Status: He is alert and oriented to person, place, and time.   Psychiatric:         Mood and Affect: Mood normal.         Behavior: Behavior normal.              Assessment and Plan     1. Weight gain  Multifactorial; okay to stay on metformin as long as tolerates  Reviewed risks/benefits/se's    2. NAFLD (nonalcoholic fatty liver disease)  Comments:  goal 5-10% tbw    3. ADD (attention deficit disorder with hyperactivity)  Overview:  Dx updated per 2019 IMO Load      4. Anxiety disorder, unspecified type  Chronic/stable    5. Class 3 severe obesity due to excess calories with serious comorbidity and body mass index (BMI) of 45.0 to 49.9 in adult  Good visit with dietitian; pt to build on this  Goals: be more active  Encouraged f/u   Work on increasing protein, fiber    6. Autism disorder ( formerly Asperger)      Had appt Dr Beltran; will message:      Failed topamax (se's) tolerating metfomrin,  CI to wellbutrin, phentermine  ? Wegovy would be an option; he has no CI currently; cost prohibitive now but  Once off back order can see if  will cover.     Agree with change to vyvanse  May consider wegovy     Follow up in about 3 months (around 4/12/2024), or if symptoms worsen or fail to improve.

## 2024-01-17 ENCOUNTER — TELEPHONE (OUTPATIENT)
Dept: PRIMARY CARE CLINIC | Facility: CLINIC | Age: 24
End: 2024-01-17
Payer: OTHER GOVERNMENT

## 2024-01-17 NOTE — TELEPHONE ENCOUNTER
Attempted to call pt to reschedule appt that was canceled 1/16. Telephone number listed not in service

## 2024-01-26 ENCOUNTER — TELEPHONE (OUTPATIENT)
Dept: PSYCHIATRY | Facility: CLINIC | Age: 24
End: 2024-01-26
Payer: OTHER GOVERNMENT

## 2024-01-30 ENCOUNTER — OFFICE VISIT (OUTPATIENT)
Dept: PSYCHIATRY | Facility: CLINIC | Age: 24
End: 2024-01-30
Payer: OTHER GOVERNMENT

## 2024-01-30 VITALS — HEART RATE: 98 BPM | SYSTOLIC BLOOD PRESSURE: 130 MMHG | DIASTOLIC BLOOD PRESSURE: 78 MMHG

## 2024-01-30 DIAGNOSIS — F31.9 BIPOLAR AFFECTIVE DISORDER, REMISSION STATUS UNSPECIFIED: Primary | ICD-10-CM

## 2024-01-30 DIAGNOSIS — F50.81 BINGE EATING DISORDER: ICD-10-CM

## 2024-01-30 DIAGNOSIS — F84.0 AUTISM DISORDER: ICD-10-CM

## 2024-01-30 DIAGNOSIS — F41.9 ANXIETY DISORDER, UNSPECIFIED TYPE: ICD-10-CM

## 2024-01-30 DIAGNOSIS — F90.9 ATTENTION DEFICIT DISORDER WITH HYPERACTIVITY: ICD-10-CM

## 2024-01-30 PROCEDURE — 99212 OFFICE O/P EST SF 10 MIN: CPT | Mod: PBBFAC | Performed by: PSYCHIATRY & NEUROLOGY

## 2024-01-30 PROCEDURE — 99999 PR PBB SHADOW E&M-EST. PATIENT-LVL II: CPT | Mod: PBBFAC,,, | Performed by: PSYCHIATRY & NEUROLOGY

## 2024-01-30 PROCEDURE — 99214 OFFICE O/P EST MOD 30 MIN: CPT | Mod: S$PBB,,, | Performed by: PSYCHIATRY & NEUROLOGY

## 2024-01-30 RX ORDER — ZIPRASIDONE HYDROCHLORIDE 80 MG/1
80 CAPSULE ORAL DAILY
Qty: 90 CAPSULE | Refills: 0 | Status: SHIPPED | OUTPATIENT
Start: 2024-01-30 | End: 2024-04-01 | Stop reason: SDUPTHER

## 2024-01-30 RX ORDER — DEXTROAMPHETAMINE SACCHARATE, AMPHETAMINE ASPARTATE MONOHYDRATE, DEXTROAMPHETAMINE SULFATE AND AMPHETAMINE SULFATE 5; 5; 5; 5 MG/1; MG/1; MG/1; MG/1
20 CAPSULE, EXTENDED RELEASE ORAL EVERY MORNING
Qty: 30 CAPSULE | Refills: 0 | Status: SHIPPED | OUTPATIENT
Start: 2024-01-30 | End: 2024-04-01 | Stop reason: ALTCHOICE

## 2024-01-30 RX ORDER — FLUOXETINE HYDROCHLORIDE 20 MG/1
CAPSULE ORAL
Qty: 270 CAPSULE | Refills: 0
Start: 2024-01-30 | End: 2024-04-01 | Stop reason: SDUPTHER

## 2024-01-30 NOTE — PROGRESS NOTES
"    Herminio Turner   2000 01/30/2024        CURRENT PRESENTATION:   The patient presents for follow-up of bipolar disorder, anxiety, ADHD, autism, and concerns regarding obesity.  When he was last seen 1 month ago, he reported times of 1-2 days of mildly depressed feelings about twice per month; anxiety was well controlled; he was focusing well; he was sleeping 7-9 hours nightly, going to bed at some point between 11:00 p.m. and 2:00 a.m.  "The father's to concerns are impulsivity.  His father says that if he wants something (usually and online item related to technology that the patient is interested in), he will fixate on the item and ask for it repeatedly, without anger or agitation.  His father is also concerned about difficulty losing weight.  His father does not wish for any current medication changes as they are going on a family trip to [place] in the coming days.          Although the patient has no history of seizure disorders, Wellbutrin may not be an advisable medication for the patient for weight issues as he meets criteria for binge eating, with questioning.  Almost nightly, he engages in a discrete period of time in which she eats a large amount of snack food, with the sense that he can not control the eating, eating until he is uncomfortably full, feeling embarrassed by the behavior and doing so after everyone else is asleep, feeling guilty about the eating, feeling distressed about the behavior; no bingeing and no restricting.  This has gone on for many years.          The binge eating is 1 element of the patient's weight difficulty.  Standpoint of medications, his weight has been relatively stable since the change to Geodon.  The possibility of Prozac over time causing weight gain was discussed.  The patient, his father, and I devised a plan of my seeing him next month to adjust medications, continuing Geodon, beginning weaning Prozac, and changing Adderall to Vyvanse."     indicates " that 30 capsules of XR 20 mg were last filled on December 26.    In the current session, the patient presents with his mother at his request and with his consent.  He reports euthymic mood with no depression, dora, or hypomania, including with specific questioning.  Anxiety controlled.  Sleeping well, though still going to bed late.  Focusing well.  No psychosis, suicidal ideation, thoughts of self-harm, homicidal ideation, thoughts of harm towards others, or feelings aggression.  The patient himself denies any side effects of medications, including dyskinetic movements, and AIMS0.    The patient continues with binge eating at night.    His mother confirms all of the above.    The patient indicates taking Vyvanse in the past and found it not as effective for ADHD.  With long discussion about treatment options, the patient elects, with his mother's support, to continue Geodon and Adderall as is and to begin weaning Prozac.    Interim history:  Living situation/supports:  Positive trip to [place].  The family is next going to [place]  Last session -  The patient reports that he has been playing video games and has enjoyed buying a few games on day, spending 100 dollars, within his budget and not excessive.  He has started walking again now that the weather is cooler.  He sees his grandparents at least once a week and his aunt next door almost daily.  He reports that he is getting along with his parents and with his 16-year-old sister.  Medical issues:  January 12th lifestyle and wellness visit -   Failed topamax (se's) tolerating metfomrin,  CI to wellbutrin, phentermine  ? Wegovy would be an option; he has no CI currently; cost prohibitive now but  Once off back order can see if  will cover.   Agree with change to vyvanse  May consider wegovy   Nonpsychotropic Medications:  Metformin   Allergies:   Review of patient's allergies indicates:   Allergen Reactions    Abilify [aripiprazole] Other (See Comments)      Some impulsive buying (Xbox: Dec 2022) and some wt gain     Alcohol use:  None  Other substance use:  None    Mental Status Exam:   Appearance:  Appropriately groomed  Orientation:  X4  Attitude:  Cooperative, engaged   Eye Contact:  Appropriate  Behavior:  Calm, appropriate  Speech:     Rate - WNL    Volume - WNL    Quantity - WNL    Tone - relaxed, appropriate  Pressure - no  Thought Processes:  Goal-directed  Mood:  Euthymic   Affect:  Without distress, euthymic, including ability to brighten at appropriate times  SI:  No, and no thoughts of self-harm  HI:  No, and no thoughts of harm towards others  Paranoia:  No  Delusions:  No  Hallucinations:  No  Attention:  Intact over the course of the session  Cognition:  No deficits noted over the course of the session  Insight:  Intact   Judgment:  Intact  Impulse Control:  Intact        ASSESSMENT:   Encounter Diagnoses   Name Primary?    Bipolar affective disorder, remission status unspecified Yes    Attention deficit disorder with hyperactivity     Binge eating disorder     Anxiety disorder, unspecified type     Autism disorder          PLAN:     Follow up in 8 weeks.      Psychiatry Medication:  Continue Adderall XR 20 mg daily and Geodon 80 mg daily.  Decrease Prozac 20 mg to 2 capsules daily for 4 weeks and then 1 capsule daily.  The patient and family will monitor for relapsing signs and symptoms of depression, for which the patient will message; the patient and his mother understand the risks associated with relapsing depression.    Reviewed with patient:  Report side effects, other problems, or questions to the psychiatrist by way of the [employer] portal, [facility], or by calling [facility] at [phone].  Messages are checked during clinic hours only.  For urgent issues outside of clinic hours, call 911 or go to an emergency department.  Follow up with primary care/MD specialist for continued monitoring of general health and wellness and any  medical conditions.  Call [facility] at [phone] or use the [facility] portal if necessary for scheduling or rescheduling.  It is the responsibility of the patient to reschedule an appointment if an appointment has been canceled or missed.  Understanding was expressed; and no further concerns or questions were raised at this time.     036[address]  2 or more stable chronic illnesses and Prescription drug management      Large portions of this note were completed by way of voice recognition dictation software, and transcription errors are possible, such that specific information in the note should be considered in the context of the entire report.

## 2024-03-04 ENCOUNTER — OFFICE VISIT (OUTPATIENT)
Dept: HEPATOLOGY | Facility: CLINIC | Age: 24
End: 2024-03-04
Payer: OTHER GOVERNMENT

## 2024-03-04 VITALS — BODY MASS INDEX: 40.43 KG/M2 | HEIGHT: 74 IN | WEIGHT: 315 LBS

## 2024-03-04 DIAGNOSIS — R74.8 ELEVATED LIVER ENZYMES: ICD-10-CM

## 2024-03-04 DIAGNOSIS — K76.0 FATTY LIVER: Primary | ICD-10-CM

## 2024-03-04 PROCEDURE — 99213 OFFICE O/P EST LOW 20 MIN: CPT | Mod: 95,,, | Performed by: NURSE PRACTITIONER

## 2024-03-04 NOTE — PROGRESS NOTES
Clinic Follow Up:  [facility] Follow Up Note    Reason for Follow Up:  The primary encounter diagnosis was Fatty liver. A diagnosis of Elevated liver enzymes was also pertinent to this visit.    PCP: Giancarlo Mesa     The patient location is: [place]  The chief complaint leading to consultation is: above    Visit type: audiovisual    Face to Face time with patient: 15 minutes  20 minutes of total time spent on the encounter, which includes face to face time and non-face to face time preparing to see the patient (eg, review of tests), Obtaining and/or reviewing separately obtained history, Documenting clinical information in the electronic or other health record, Independently interpreting results (not separately reported) and communicating results to the patient/family/caregiver, or Care coordination (not separately reported).         Each patient to whom he or she provides medical services by telemedicine is:  (1) informed of the relationship between the physician and patient and the respective role of any other health care provider with respect to management of the patient; and (2) notified that he or she may decline to receive medical services by telemedicine and may withdraw from such care at any time.    Notes:       HPI:  This is a 23 y.o. male here for follow up of the above  Pt states that he has been feeling overall well without any new complaints.   Previous labs show no evidence of autoimmune, cholestatic or viral hepatitis  Has been working on improved nutrition and following with [facility] clinic  Denies any abdominal pain.  No nausea or vomiting.  No change in bowel pattern.  No melena or hematochezia. No weight loss.  No upper GI bleeding.  No ascites or BLE.  No overt confusion.      Review of Systems   Constitutional:  Negative for chills, fever, malaise/fatigue and weight loss.   Respiratory:  Negative for cough.    Cardiovascular:  Negative for chest pain.    Gastrointestinal:         Per HPI   Musculoskeletal:  Negative for myalgias.   Skin:  Negative for itching and rash.   Neurological:  Negative for headaches.   Psychiatric/Behavioral:  The patient is not nervous/anxious.        Medical History:  Past Medical History:   Diagnosis Date    ADD (attention deficit disorder with hyperactivity) 6/18/2013    Anxiety     Depression        Surgical History:   Past Surgical History:   Procedure Laterality Date    ADENOIDECTOMY      CIRCUMCISION      TONSILLECTOMY      TYMPANOSTOMY TUBE PLACEMENT         Family History:   Family History   Adopted: Yes   Problem Relation Age of Onset    Crohn's disease Mother     No Known Problems Father        Social History:   Social History     Tobacco Use    Smoking status: Never     Passive exposure: Never    Smokeless tobacco: Never   Substance Use Topics    Alcohol use: No    Drug use: Never       Allergies: Reviewed    Home Medications:  Current Outpatient Medications on File Prior to Visit   Medication Sig Dispense Refill    dextroamphetamine-amphetamine (ADDERALL XR) 20 MG 24 hr capsule Take 1 capsule (20 mg total) by mouth every morning. Fill on or after 02/29/2024. 30 capsule 0    FLUoxetine 20 MG capsule Take 2 capsules daily for 4 weeks, then decrease to 1 capsule daily. 270 capsule 0    iron-vitamin C 100-250 mg, ICAR-C, (ICAR-C) 100-250 mg Tab Take 1 tablet by mouth once daily. 90 tablet 3    ketoconazole (NIZORAL) 2 % shampoo Apply topically 3 (three) times a week. 120 mL 2    metFORMIN (GLUCOPHAGE-XR) 500 MG ER 24hr tablet Take 1 tablet (500 mg total) by mouth once daily for 14 days, THEN 1 tablet (500 mg total) 2 (two) times daily with meals for 14 days. 60 tablet 2    ziprasidone (GEODON) 80 MG capsule Take 1 capsule (80 mg total) by mouth once daily. 90 capsule 0    dextroamphetamine-amphetamine (ADDERALL XR) 20 MG 24 hr capsule Take 1 capsule (20 mg total) by mouth every morning. Fill on or after 01/22/2024. (Patient not  "taking: Reported on 3/4/2024) 30 capsule 0     No current facility-administered medications on file prior to visit.       Physical Exam:  Vital Signs:  Ht 6' 2" (1.88 m)   Wt (!) 174.6 kg (385 lb)   BMI 49.43 kg/m²   Body mass index is 49.43 kg/m².  Physical Exam  Constitutional:       Appearance: He is well-developed. He is obese.   HENT:      Head: Normocephalic.   Eyes:      General: No scleral icterus.  Pulmonary:      Effort: Pulmonary effort is normal.   Musculoskeletal:         General: Normal range of motion.      Cervical back: Normal range of motion.   Skin:     General: Skin is dry.   Neurological:      Mental Status: He is alert.         Labs: Pertinent labs reviewed.      Assessment:  1. Fatty liver    2. Elevated liver enzymes        Recommendations:  Stable without new complaints  Will get updated liver enzymes with his next labs.  If overall stable, can follow up in 6-12 month  Weight loss encouraged with improved nutrition  Continue follow up with lifestyle and wellness        Return to Clinic:    As above   "

## 2024-03-08 DIAGNOSIS — F90.9 ATTENTION DEFICIT DISORDER WITH HYPERACTIVITY: ICD-10-CM

## 2024-03-08 RX ORDER — DEXTROAMPHETAMINE SACCHARATE, AMPHETAMINE ASPARTATE MONOHYDRATE, DEXTROAMPHETAMINE SULFATE AND AMPHETAMINE SULFATE 5; 5; 5; 5 MG/1; MG/1; MG/1; MG/1
20 CAPSULE, EXTENDED RELEASE ORAL EVERY MORNING
Qty: 30 CAPSULE | Refills: 0 | OUTPATIENT
Start: 2024-03-08

## 2024-03-08 NOTE — TELEPHONE ENCOUNTER
Request to refill Adderall has been received.   indicates recent refills on December 26 and February 5, such that the request is timely.  Epic indicates indicates a prescription on December 21 to fill on or after January 22 and a prescription on January 30 to fill on or after February 29.  Therefore, a prescription is available, and the request is a duplicate.

## 2024-03-21 ENCOUNTER — LAB VISIT (OUTPATIENT)
Dept: LAB | Facility: HOSPITAL | Age: 24
End: 2024-03-21
Attending: NURSE PRACTITIONER
Payer: OTHER GOVERNMENT

## 2024-03-21 DIAGNOSIS — D50.9 IRON DEFICIENCY ANEMIA, UNSPECIFIED IRON DEFICIENCY ANEMIA TYPE: ICD-10-CM

## 2024-03-21 DIAGNOSIS — R74.8 ELEVATED LIVER ENZYMES: ICD-10-CM

## 2024-03-21 DIAGNOSIS — K76.0 FATTY LIVER: ICD-10-CM

## 2024-03-21 LAB
ALBUMIN SERPL BCP-MCNC: 3.8 G/DL (ref 3.5–5.2)
ALP SERPL-CCNC: 98 U/L (ref 55–135)
ALT SERPL W/O P-5'-P-CCNC: 88 U/L (ref 10–44)
AST SERPL-CCNC: 44 U/L (ref 10–40)
BASOPHILS # BLD AUTO: 0.02 K/UL (ref 0–0.2)
BASOPHILS NFR BLD: 0.3 % (ref 0–1.9)
BILIRUB DIRECT SERPL-MCNC: 0.2 MG/DL (ref 0.1–0.3)
BILIRUB SERPL-MCNC: 0.5 MG/DL (ref 0.1–1)
DIFFERENTIAL METHOD BLD: ABNORMAL
EOSINOPHIL # BLD AUTO: 0.1 K/UL (ref 0–0.5)
EOSINOPHIL NFR BLD: 1.3 % (ref 0–8)
ERYTHROCYTE [DISTWIDTH] IN BLOOD BY AUTOMATED COUNT: 12.6 % (ref 11.5–14.5)
FERRITIN SERPL-MCNC: 136 NG/ML (ref 20–300)
HCT VFR BLD AUTO: 41 % (ref 40–54)
HGB BLD-MCNC: 13.4 G/DL (ref 14–18)
IMM GRANULOCYTES # BLD AUTO: 0.03 K/UL (ref 0–0.04)
IMM GRANULOCYTES NFR BLD AUTO: 0.4 % (ref 0–0.5)
IRON SERPL-MCNC: 71 UG/DL (ref 45–160)
LYMPHOCYTES # BLD AUTO: 2.1 K/UL (ref 1–4.8)
LYMPHOCYTES NFR BLD: 29.6 % (ref 18–48)
MCH RBC QN AUTO: 27.8 PG (ref 27–31)
MCHC RBC AUTO-ENTMCNC: 32.7 G/DL (ref 32–36)
MCV RBC AUTO: 85 FL (ref 82–98)
MONOCYTES # BLD AUTO: 0.4 K/UL (ref 0.3–1)
MONOCYTES NFR BLD: 6.1 % (ref 4–15)
NEUTROPHILS # BLD AUTO: 4.5 K/UL (ref 1.8–7.7)
NEUTROPHILS NFR BLD: 62.3 % (ref 38–73)
NRBC BLD-RTO: 0 /100 WBC
PLATELET # BLD AUTO: 293 K/UL (ref 150–450)
PMV BLD AUTO: 9.9 FL (ref 9.2–12.9)
PROT SERPL-MCNC: 6.4 G/DL (ref 6–8.4)
RBC # BLD AUTO: 4.82 M/UL (ref 4.6–6.2)
SATURATED IRON: 16 % (ref 20–50)
TOTAL IRON BINDING CAPACITY: 434 UG/DL (ref 250–450)
TRANSFERRIN SERPL-MCNC: 293 MG/DL (ref 200–375)
WBC # BLD AUTO: 7.17 K/UL (ref 3.9–12.7)

## 2024-03-21 PROCEDURE — 85025 COMPLETE CBC W/AUTO DIFF WBC: CPT | Performed by: NURSE PRACTITIONER

## 2024-03-21 PROCEDURE — 82728 ASSAY OF FERRITIN: CPT | Performed by: NURSE PRACTITIONER

## 2024-03-21 PROCEDURE — 80076 HEPATIC FUNCTION PANEL: CPT | Performed by: NURSE PRACTITIONER

## 2024-03-21 PROCEDURE — 36415 COLL VENOUS BLD VENIPUNCTURE: CPT | Mod: PO | Performed by: NURSE PRACTITIONER

## 2024-03-21 PROCEDURE — 83540 ASSAY OF IRON: CPT | Performed by: NURSE PRACTITIONER

## 2024-03-31 NOTE — PROGRESS NOTES
Herminio Varner Johnny   2000 04/01/2024        CURRENT PRESENTATION:   The patient presents for follow-up of bipolar disorder, anxiety, ADHD, autism, and concerns regarding obesity.  Documentation from his last visit 2 months ago includes:   ...the patient presents with his mother at his request and with his consent.  He reports euthymic mood with no depression, dora, or hypomania, including with specific questioning.  Anxiety controlled.  Sleeping well, though still going to bed late.  Focusing well.  No psychosis, suicidal ideation, thoughts of self-harm, homicidal ideation, thoughts of harm towards others, or feelings aggression.  The patient himself denies any side effects of medications, including dyskinetic movements, and AIMS0.       The patient continues with binge eating at night.        His mother confirms all of the above.       The patient indicates taking Vyvanse in the past and found it not as effective for ADHD.  With long discussion about treatment options, the patient elects, with his mother's support, to continue Geodon and Adderall as is and to begin weaning Prozac.  Psychiatry Medication:  Continue Adderall XR 20 mg daily and Geodon 80 mg daily.  Decrease Prozac 20 mg to 2 capsules daily for 4 weeks and then 1 capsule daily.      indicates timely refills of Adderall XR 20 mg, with the last being on March 8.    In the current session, the patient reports some decrease in urges to binge and intake with perhaps a 5 lb weight loss since decreasing Prozac.  He reasonably hesitates to decrease further from 20 mg because he notes mild intermittent anxiety in the form of transient worried thoughts about his grandmother's health and the family dog having been traumatized after an attack by the neighbor's dog about 6 weeks ago (physically okay, but seeming to seek comfort from the family), with both these issues having some basis in reality.  No depression, elevated moods, irritable moods,  manic signs or symptoms, psychosis, suicidal ideation, thoughts of self-harm, homicidal ideation, thoughts of harm towards others, feelings of aggression, impulsivity.  Focusing well.  Sleeping well but still often going to bed late and sleeping late.  No side effects of medications.  AIMS 0.    In addition to requesting that Prozac be continued at 20 mg, he feels that he wants to see if a change to Vyvanse will help decrease his appetite further and assist with the binge eating.  He indicates awareness that Vyvanse may be less effective than Adderall, as it was in the past.  Rationale, risks, and side effects were reviewed, and he requests the change.      His father later joins the session with the patient's consent, in his father has nothing further to add and feels that the treatment plan is reasonable.    Interim history:  Living situation/supports:  Enjoyable trip to [place].  Last session -  The patient reports that he has been playing video games and has enjoyed buying a few games on day, spending 100 dollars, within his budget and not excessive.  He has started walking again now that the weather is cooler.  He sees his grandparents at least once a week and his aunt next door almost daily.  He reports that he is getting along with his parents and with his 17-year-old sister.  The family is planning a trip to [place].  Medical issues:  Hepatology visit indicates stably fatty liver and encouragement of improved nutrition.  Nonpsychotropic Medications:  Metformin   Allergies:   Review of patient's allergies indicates:   Allergen Reactions    Abilify [aripiprazole] Other (See Comments)     Some impulsive buying (Xbox: Dec 2022) and some wt gain     Alcohol use:  2 drinks on occasions with a family member, without risk or sequelae, and no craving otherwise  Other substance use:  None and no craving    Mental Status Exam:   Appearance:  Appropriately groomed  Orientation:  X4  Attitude:  Cooperative,  engaged   Eye Contact:  Appropriate  Behavior:  Calm, appropriate  Speech:     Rate - WNL    Volume - WNL    Quantity - WNL    Tone - relaxed, appropriate  Pressure - no  Thought Processes:  Goal-directed  Mood:  Euthymic, mildly anxious on occasions   Affect:  Without distress, euthymic, including ability to brighten at appropriate times  SI:  No, and no thoughts of self-harm  HI:  No, and no thoughts of harm towards others  Paranoia:  No  Delusions:  No  Hallucinations:  No  Attention:  Intact over the course of the session  Cognition:  No deficits noted over the course of the session  Insight:  Intact   Judgment:  Intact  Impulse Control:  Intact        ASSESSMENT:   Encounter Diagnoses   Name Primary?    Bipolar affective disorder, remission status unspecified Yes    Attention deficit disorder with hyperactivity     Binge eating disorder     Anxiety disorder, unspecified type          PLAN:     Follow up in 2 months, message in for questions or problems and message in with a otherwise.      Psychiatry Medication:  Discontinue Adderall.  Begin Vyvanse 30 mg daily.  Continue Prozac 20 mg daily and Geodon 80 mg daily.    Reviewed with patient:  Report side effects, other problems, or questions to the psychiatrist by way of the [facility] portal, [facility], or by calling [facility] at [phone].  Messages are checked during clinic hours only.  For urgent issues outside of clinic hours, call 911 or go to an emergency department.  Follow up with primary care/MD specialist for continued monitoring of general health and wellness and any medical conditions.  Call [facility] at [phone] or use the [facility] portal if necessary for scheduling or rescheduling.  It is the responsibility of the patient to reschedule an appointment if an appointment has been canceled or missed.  Understanding was expressed; and no further concerns or questions were raised at this time.     [address]  2 or more  stable chronic illnesses and Prescription drug management      Large portions of this note were completed by way of voice recognition dictation software, and transcription errors are possible, such that specific information in the note should be considered in the context of the entire report.

## 2024-04-01 ENCOUNTER — OFFICE VISIT (OUTPATIENT)
Dept: PSYCHIATRY | Facility: CLINIC | Age: 24
End: 2024-04-01
Payer: OTHER GOVERNMENT

## 2024-04-01 VITALS — SYSTOLIC BLOOD PRESSURE: 131 MMHG | HEART RATE: 91 BPM | DIASTOLIC BLOOD PRESSURE: 84 MMHG

## 2024-04-01 DIAGNOSIS — F31.9 BIPOLAR AFFECTIVE DISORDER, REMISSION STATUS UNSPECIFIED: Primary | ICD-10-CM

## 2024-04-01 DIAGNOSIS — F41.9 ANXIETY DISORDER, UNSPECIFIED TYPE: ICD-10-CM

## 2024-04-01 DIAGNOSIS — F90.9 ATTENTION DEFICIT DISORDER WITH HYPERACTIVITY: ICD-10-CM

## 2024-04-01 DIAGNOSIS — F50.81 BINGE EATING DISORDER: ICD-10-CM

## 2024-04-01 PROCEDURE — 99999 PR PBB SHADOW E&M-EST. PATIENT-LVL II: CPT | Mod: PBBFAC,,, | Performed by: PSYCHIATRY & NEUROLOGY

## 2024-04-01 PROCEDURE — 99212 OFFICE O/P EST SF 10 MIN: CPT | Mod: PBBFAC | Performed by: PSYCHIATRY & NEUROLOGY

## 2024-04-01 PROCEDURE — 99214 OFFICE O/P EST MOD 30 MIN: CPT | Mod: S$PBB,,, | Performed by: PSYCHIATRY & NEUROLOGY

## 2024-04-01 RX ORDER — LISDEXAMFETAMINE DIMESYLATE 30 MG/1
30 CAPSULE ORAL EVERY MORNING
Qty: 30 CAPSULE | Refills: 0 | Status: SHIPPED | OUTPATIENT
Start: 2024-04-01 | End: 2024-04-30 | Stop reason: SDUPTHER

## 2024-04-01 RX ORDER — FLUOXETINE HYDROCHLORIDE 20 MG/1
CAPSULE ORAL
Qty: 270 CAPSULE | Refills: 0
Start: 2024-04-01 | End: 2024-04-30 | Stop reason: SDUPTHER

## 2024-04-01 RX ORDER — ZIPRASIDONE HYDROCHLORIDE 80 MG/1
80 CAPSULE ORAL DAILY
Qty: 90 CAPSULE | Refills: 0 | Status: SHIPPED | OUTPATIENT
Start: 2024-04-01

## 2024-04-15 ENCOUNTER — OFFICE VISIT (OUTPATIENT)
Dept: PRIMARY CARE CLINIC | Facility: CLINIC | Age: 24
End: 2024-04-15
Payer: OTHER GOVERNMENT

## 2024-04-15 VITALS
DIASTOLIC BLOOD PRESSURE: 82 MMHG | SYSTOLIC BLOOD PRESSURE: 132 MMHG | HEART RATE: 108 BPM | BODY MASS INDEX: 40.43 KG/M2 | HEIGHT: 74 IN | TEMPERATURE: 98 F | OXYGEN SATURATION: 97 % | WEIGHT: 315 LBS

## 2024-04-15 DIAGNOSIS — K76.0 NAFLD (NONALCOHOLIC FATTY LIVER DISEASE): Primary | ICD-10-CM

## 2024-04-15 DIAGNOSIS — F90.9 ATTENTION DEFICIT DISORDER WITH HYPERACTIVITY: ICD-10-CM

## 2024-04-15 DIAGNOSIS — E66.01 CLASS 3 SEVERE OBESITY DUE TO EXCESS CALORIES WITH SERIOUS COMORBIDITY AND BODY MASS INDEX (BMI) OF 45.0 TO 49.9 IN ADULT: ICD-10-CM

## 2024-04-15 DIAGNOSIS — F84.0 AUTISM DISORDER: ICD-10-CM

## 2024-04-15 DIAGNOSIS — F41.9 ANXIETY DISORDER, UNSPECIFIED TYPE: ICD-10-CM

## 2024-04-15 DIAGNOSIS — F31.62 BIPOLAR 1 DISORDER, MIXED, MODERATE: ICD-10-CM

## 2024-04-15 PROCEDURE — 99213 OFFICE O/P EST LOW 20 MIN: CPT | Mod: PBBFAC | Performed by: FAMILY MEDICINE

## 2024-04-15 PROCEDURE — 99214 OFFICE O/P EST MOD 30 MIN: CPT | Mod: S$PBB,,, | Performed by: FAMILY MEDICINE

## 2024-04-15 PROCEDURE — 99999 PR PBB SHADOW E&M-EST. PATIENT-LVL III: CPT | Mod: PBBFAC,,, | Performed by: FAMILY MEDICINE

## 2024-04-15 NOTE — PROGRESS NOTES
Subjective     Patient ID: Herminio Turner is a 23 y.o. male.    Chief Complaint: follow up    Pt with hx of ADD, NAFLD, bipolar. Chronic conditions are stable. Has had vyvanse which had helped.   Pt states is really tired today.   Pt plans to schedule appt with Pulmon/is compliant with CPAP. Not driving; no license.     Stopped topamax due to reaction.   Anxiety is stable; reports has psych f/u.       HPI       Objective     PAST MEDICAL HISTORY:  Past Medical History:   Diagnosis Date    ADD (attention deficit disorder with hyperactivity) 6/18/2013    Anxiety     Depression          PAST SURGICAL HISTORY:  Past Surgical History:   Procedure Laterality Date    ADENOIDECTOMY      CIRCUMCISION      TONSILLECTOMY      TYMPANOSTOMY TUBE PLACEMENT         FAMILY HISTORY:  Family History   Adopted: Yes   Problem Relation Name Age of Onset    Crohn's disease Mother      No Known Problems Father            SOCIAL HISTORY:  Social History     Social History Narrative    Not on file       MEDICATIONS:  Medications have been reviewed.    ALLERGIES:  Allergies have been reviewed.    Vitals:    04/15/24 1338   BP: 132/82   Pulse: 108   Temp: 97.7 °F (36.5 °C)     Wt Readings from Last 10 Encounters:   04/15/24 (!) 175.5 kg (386 lb 14.5 oz)   03/04/24 (!) 174.6 kg (385 lb)   01/12/24 (!) 173.6 kg (382 lb 11.5 oz)   12/21/23 (!) 171.4 kg (377 lb 13.9 oz)   11/07/23 (!) 168.6 kg (371 lb 11.1 oz)   10/12/23 (!) 166.2 kg (366 lb 6.5 oz)   08/16/23 (!) 162 kg (357 lb 2.3 oz)   07/07/23 (!) 165 kg (363 lb 12.1 oz)   06/05/23 (!) 161.6 kg (356 lb 4.2 oz)   03/01/23 (!) 164.9 kg (363 lb 8.6 oz)       Lab Results   Component Value Date    WBC 7.17 03/21/2024    HGB 13.4 (L) 03/21/2024    HCT 41.0 03/21/2024     03/21/2024    CHOL 174 03/03/2023    TRIG 88 03/03/2023    HDL 62 03/03/2023    ALT 88 (H) 03/21/2024    AST 44 (H) 03/21/2024     11/07/2023    K 3.7 11/07/2023     11/07/2023    CREATININE 0.6 11/07/2023     BUN 13 11/07/2023    CO2 23 11/07/2023    TSH 3.829 03/03/2023    INR 1.0 11/07/2023    HGBA1C 5.3 03/03/2023       Review of Systems   Constitutional:  Negative for activity change, appetite change, fatigue and unexpected weight change.   HENT:  Negative for mouth dryness.    Eyes:  Negative for visual disturbance.   Respiratory:  Negative for apnea, chest tightness and shortness of breath.    Cardiovascular:  Negative for chest pain.   Gastrointestinal:  Negative for abdominal pain.   Musculoskeletal:  Negative for arthralgias and myalgias.   Integumentary:  Negative for rash.   Allergic/Immunologic: Negative for food allergies.   Neurological:  Negative for dizziness, vertigo, tremors, seizures, syncope, weakness, numbness and headaches.   Psychiatric/Behavioral:  Negative for agitation, behavioral problems, self-injury, sleep disturbance and suicidal ideas. The patient is not nervous/anxious.        Physical Exam  Vitals and nursing note reviewed.   Constitutional:       General: He is not in acute distress.  HENT:      Head: Normocephalic and atraumatic.      Mouth/Throat:      Pharynx: Oropharynx is clear.   Eyes:      General: No scleral icterus.     Pupils: Pupils are equal, round, and reactive to light.   Neck:      Comments: No TM  Cardiovascular:      Rate and Rhythm: Normal rate and regular rhythm.      Pulses: Normal pulses.      Heart sounds: Normal heart sounds. No murmur heard.     No friction rub. No gallop.   Pulmonary:      Effort: Pulmonary effort is normal. No respiratory distress.      Breath sounds: Normal breath sounds. No wheezing, rhonchi or rales.   Abdominal:      General: Bowel sounds are normal. There is no distension.      Palpations: Abdomen is soft.      Tenderness: There is no abdominal tenderness.   Musculoskeletal:         General: No swelling.      Cervical back: Normal range of motion and neck supple. No tenderness.   Lymphadenopathy:      Cervical: No cervical adenopathy.    Skin:     General: Skin is warm.      Findings: No erythema or rash.   Neurological:      Mental Status: He is alert and oriented to person, place, and time.   Psychiatric:         Mood and Affect: Mood normal.         Behavior: Behavior normal.              Assessment and Plan     1. NAFLD (nonalcoholic fatty liver disease)    2. Class 3 severe obesity due to excess calories with serious comorbidity and body mass index (BMI) of 45.0 to 49.9 in adult    3. Bipolar 1 disorder, mixed, moderate    4. Anxiety disorder, unspecified type    5. ADD (attention deficit disorder with hyperactivity)    6. Autism disorder ( formerly Asperger)      NAFLD (nonalcoholic fatty liver disease)    Class 3 severe obesity due to excess calories with serious comorbidity and body mass index (BMI) of 45.0 to 49.9 in adult    Bipolar 1 disorder, mixed, moderate    Anxiety disorder, unspecified type    ADD (attention deficit disorder with hyperactivity)    Autism disorder ( formerly Asperger)        Chronic health conditions stable  No gi intolerance  Pt declines to try wegovy at this time--would be an option in the future    Failed topamax (se's) tolerating metfomrin,  CI to wellbutrin, phentermine  ? Wegovy would be an option; he has no CI currently; cost prohibitive now but can see if  will cover if needs through pcp.      Taking metformin once daily - if any gi upset can hold/stop  Reviewed with pt he is welcome to f/u in future; he plans to focus on weight at a later time.     Labs from hepatology reviewed    Keep regularly scheduled f/u with Dr Beltran, Dr Mesa, Dr Gary        Follow up if symptoms worsen or fail to improve.

## 2024-04-30 DIAGNOSIS — F90.9 ATTENTION DEFICIT DISORDER WITH HYPERACTIVITY: ICD-10-CM

## 2024-04-30 DIAGNOSIS — F31.9 BIPOLAR AFFECTIVE DISORDER, REMISSION STATUS UNSPECIFIED: ICD-10-CM

## 2024-04-30 RX ORDER — LISDEXAMFETAMINE DIMESYLATE 30 MG/1
30 CAPSULE ORAL EVERY MORNING
Qty: 30 CAPSULE | Refills: 0 | OUTPATIENT
Start: 2024-04-30

## 2024-04-30 RX ORDER — FLUOXETINE HYDROCHLORIDE 20 MG/1
20 CAPSULE ORAL DAILY
Qty: 90 CAPSULE | Refills: 0 | Status: SHIPPED | OUTPATIENT
Start: 2024-04-30

## 2024-04-30 RX ORDER — LISDEXAMFETAMINE DIMESYLATE 30 MG/1
30 CAPSULE ORAL EVERY MORNING
Qty: 30 CAPSULE | Refills: 0 | Status: SHIPPED | OUTPATIENT
Start: 2024-04-30 | End: 2024-06-06 | Stop reason: SDUPTHER

## 2024-05-04 ENCOUNTER — PATIENT MESSAGE (OUTPATIENT)
Dept: PRIMARY CARE CLINIC | Facility: CLINIC | Age: 24
End: 2024-05-04
Payer: OTHER GOVERNMENT

## 2024-05-06 ENCOUNTER — TELEPHONE (OUTPATIENT)
Dept: PRIMARY CARE CLINIC | Facility: CLINIC | Age: 24
End: 2024-05-06
Payer: OTHER GOVERNMENT

## 2024-05-06 NOTE — TELEPHONE ENCOUNTER
Called pt at listed number  Did briefly discuss Wegovy last visit.   Hx of bipolar: on geodon and vyvanse as well as fluoxetine.     Pt not a wellbutrin or topamax candidate at this time (unless Dr Beltran would ok)  Reviewed with pt will check with him.   Pt now recalls had reaction with topamax.     Also, reviewed with pt    Pt made aware I will be leaving [facility] in June. She has established pcp so have advised her f/u with them this summer.     Also reviewed potential external community options.       Called [facility]: [phone] Dr Dockery's office.  Confirmed they take  and appt available within the next month or so.   Pt to call today for appt.    melanief

## 2024-05-18 ENCOUNTER — PATIENT MESSAGE (OUTPATIENT)
Dept: PSYCHIATRY | Facility: CLINIC | Age: 24
End: 2024-05-18
Payer: OTHER GOVERNMENT

## 2024-05-29 ENCOUNTER — PATIENT MESSAGE (OUTPATIENT)
Dept: INTERNAL MEDICINE | Facility: CLINIC | Age: 24
End: 2024-05-29
Payer: OTHER GOVERNMENT

## 2024-05-31 DIAGNOSIS — F90.9 ATTENTION DEFICIT DISORDER WITH HYPERACTIVITY: ICD-10-CM

## 2024-05-31 RX ORDER — LISDEXAMFETAMINE DIMESYLATE 30 MG/1
30 CAPSULE ORAL EVERY MORNING
Qty: 30 CAPSULE | Refills: 0 | OUTPATIENT
Start: 2024-05-31

## 2024-06-04 DIAGNOSIS — F90.9 ATTENTION DEFICIT DISORDER WITH HYPERACTIVITY: ICD-10-CM

## 2024-06-04 RX ORDER — LISDEXAMFETAMINE DIMESYLATE 30 MG/1
30 CAPSULE ORAL EVERY MORNING
Qty: 30 CAPSULE | Refills: 0 | OUTPATIENT
Start: 2024-06-04

## 2024-06-06 ENCOUNTER — PATIENT MESSAGE (OUTPATIENT)
Dept: PSYCHIATRY | Facility: CLINIC | Age: 24
End: 2024-06-06
Payer: OTHER GOVERNMENT

## 2024-06-06 ENCOUNTER — OFFICE VISIT (OUTPATIENT)
Dept: INTERNAL MEDICINE | Facility: CLINIC | Age: 24
End: 2024-06-06
Payer: OTHER GOVERNMENT

## 2024-06-06 VITALS
DIASTOLIC BLOOD PRESSURE: 80 MMHG | OXYGEN SATURATION: 95 % | WEIGHT: 315 LBS | HEART RATE: 110 BPM | BODY MASS INDEX: 52.14 KG/M2 | TEMPERATURE: 97 F | SYSTOLIC BLOOD PRESSURE: 136 MMHG

## 2024-06-06 DIAGNOSIS — F90.9 ATTENTION DEFICIT DISORDER WITH HYPERACTIVITY: ICD-10-CM

## 2024-06-06 DIAGNOSIS — F31.62 BIPOLAR 1 DISORDER, MIXED, MODERATE: ICD-10-CM

## 2024-06-06 DIAGNOSIS — G47.33 OSA (OBSTRUCTIVE SLEEP APNEA): ICD-10-CM

## 2024-06-06 DIAGNOSIS — Z00.00 ROUTINE GENERAL MEDICAL EXAMINATION AT HEALTH CARE FACILITY: Primary | ICD-10-CM

## 2024-06-06 DIAGNOSIS — E66.01 CLASS 3 SEVERE OBESITY DUE TO EXCESS CALORIES WITH SERIOUS COMORBIDITY AND BODY MASS INDEX (BMI) OF 45.0 TO 49.9 IN ADULT: ICD-10-CM

## 2024-06-06 PROCEDURE — 99999 PR PBB SHADOW E&M-EST. PATIENT-LVL III: CPT | Mod: PBBFAC,,, | Performed by: INTERNAL MEDICINE

## 2024-06-06 PROCEDURE — 99213 OFFICE O/P EST LOW 20 MIN: CPT | Mod: PBBFAC,PO | Performed by: INTERNAL MEDICINE

## 2024-06-06 PROCEDURE — 99395 PREV VISIT EST AGE 18-39: CPT | Mod: S$PBB,,, | Performed by: INTERNAL MEDICINE

## 2024-06-06 RX ORDER — LISDEXAMFETAMINE DIMESYLATE 30 MG/1
30 CAPSULE ORAL EVERY MORNING
Qty: 30 CAPSULE | Refills: 0 | Status: SHIPPED | OUTPATIENT
Start: 2024-06-06

## 2024-06-06 RX ORDER — SEMAGLUTIDE 0.5 MG/.5ML
0.5 INJECTION, SOLUTION SUBCUTANEOUS
COMMUNITY
Start: 2024-05-30 | End: 2024-06-06

## 2024-06-06 RX ORDER — SEMAGLUTIDE 0.25 MG/.5ML
0.25 INJECTION, SOLUTION SUBCUTANEOUS
COMMUNITY
Start: 2024-05-30 | End: 2024-06-06

## 2024-06-06 RX ORDER — TIRZEPATIDE 2.5 MG/.5ML
2.5 INJECTION, SOLUTION SUBCUTANEOUS
Qty: 4 PEN | Refills: 0 | Status: SHIPPED | OUTPATIENT
Start: 2024-06-06

## 2024-06-06 NOTE — PROGRESS NOTES
Subjective:       Patient ID: Herminio Turner is a 23 y.o. male.    Chief Complaint: Annual Exam (Fatigue )      HPI:   Patient is a 23-year-old male presenting today for updated physical exam review of chronic health issues.  Patient has class 3 severe obesity, bipolar and sleep apnea.  He describes it generally he has been doing well but the sleep issue is problematic.  He states he is finding himself to have hypersomnolence.  He is feeling unrested when he gets up in the morning and he finds himself falling asleep in places where he should.  He states the last time he went to his psychiatrist he fell asleep in the lobby of the eyes office.  He wears his CPAP nightly and he says that he feels like it has been effective but clearly he is getting some issues with sleep at this point.    He was seen in [facility] and they prescribed Wegovy for his weight.  He has not been able to get the medication.  He states the pharmacy can not get it.  We discussed potentially trying 1 of the other agents that we might be able to have better success with getting it filled he expressed understanding.  I did educate him that the [facility] the doctor there is going to be leaving and he states he was aware of that.    Review of Systems   Constitutional:  Negative for fever and unexpected weight change.   HENT:  Negative for hearing loss, postnasal drip and rhinorrhea.    Eyes:  Negative for pain and visual disturbance.   Respiratory:  Negative for cough, shortness of breath and wheezing.    Cardiovascular:  Negative for chest pain and palpitations.   Gastrointestinal:  Negative for constipation, diarrhea, nausea and vomiting.   Genitourinary:  Negative for dysuria and hematuria.   Musculoskeletal:  Negative for arthralgias, back pain, myalgias and neck stiffness.   Skin:  Negative for pallor and rash.   Neurological:  Negative for seizures, syncope and headaches.   Hematological:  Negative for adenopathy.    Psychiatric/Behavioral:  Negative for dysphoric mood. The patient is not nervous/anxious.        Objective:   /80   Pulse 110   Temp 96.5 °F (35.8 °C)   Wt (!) 184.2 kg (406 lb 1.4 oz)   SpO2 95%   BMI 52.14 kg/m²      Physical Exam  Vitals reviewed.   Constitutional:       General: He is not in acute distress.     Appearance: He is well-developed.   HENT:      Head: Normocephalic and atraumatic.      Right Ear: Tympanic membrane and ear canal normal.      Left Ear: Tympanic membrane and ear canal normal.   Eyes:      Pupils: Pupils are equal, round, and reactive to light.   Neck:      Thyroid: No thyromegaly.      Vascular: No JVD.   Cardiovascular:      Rate and Rhythm: Normal rate and regular rhythm.      Heart sounds: Normal heart sounds. No murmur heard.     No friction rub. No gallop.   Pulmonary:      Effort: Pulmonary effort is normal.      Breath sounds: Normal breath sounds. No wheezing or rales.   Abdominal:      General: Bowel sounds are normal. There is no distension.      Palpations: Abdomen is soft.      Tenderness: There is no abdominal tenderness. There is no guarding or rebound.   Musculoskeletal:         General: Normal range of motion.      Cervical back: Normal range of motion and neck supple.   Lymphadenopathy:      Cervical: No cervical adenopathy.   Skin:     General: Skin is warm and dry.      Findings: No rash.   Neurological:      General: No focal deficit present.      Mental Status: He is alert and oriented to person, place, and time.      Cranial Nerves: No cranial nerve deficit.      Deep Tendon Reflexes: Reflexes are normal and symmetric.   Psychiatric:         Mood and Affect: Mood normal.         Judgment: Judgment normal.         No visits with results within 2 Week(s) from this visit.   Latest known visit with results is:   Lab Visit on 03/21/2024   Component Date Value    WBC 03/21/2024 7.17     RBC 03/21/2024 4.82     Hemoglobin 03/21/2024 13.4 (L)     Hematocrit  03/21/2024 41.0     MCV 03/21/2024 85     MCH 03/21/2024 27.8     MCHC 03/21/2024 32.7     RDW 03/21/2024 12.6     Platelets 03/21/2024 293     MPV 03/21/2024 9.9     Immature Granulocytes 03/21/2024 0.4     Gran # (ANC) 03/21/2024 4.5     Immature Grans (Abs) 03/21/2024 0.03     Lymph # 03/21/2024 2.1     Mono # 03/21/2024 0.4     Eos # 03/21/2024 0.1     Baso # 03/21/2024 0.02     nRBC 03/21/2024 0     Gran % 03/21/2024 62.3     Lymph % 03/21/2024 29.6     Mono % 03/21/2024 6.1     Eosinophil % 03/21/2024 1.3     Basophil % 03/21/2024 0.3     Differential Method 03/21/2024 Automated     Ferritin 03/21/2024 136     Iron 03/21/2024 71     Transferrin 03/21/2024 293     TIBC 03/21/2024 434     Saturated Iron 03/21/2024 16 (L)     Total Protein 03/21/2024 6.4     Albumin 03/21/2024 3.8     Total Bilirubin 03/21/2024 0.5     Bilirubin, Direct 03/21/2024 0.2     AST 03/21/2024 44 (H)     ALT 03/21/2024 88 (H)     Alkaline Phosphatase 03/21/2024 98        Assessment:       1. Routine general medical examination at health care facility    2. Class 3 severe obesity due to excess calories with serious comorbidity and body mass index (BMI) of 45.0 to 49.9 in adult    3. Bipolar 1 disorder, mixed, moderate    4. CHARLIE (obstructive sleep apnea)        Plan:   No problem-specific Assessment & Plan notes found for this encounter.    Herminio was seen today for annual exam.    Diagnoses and all orders for this visit:    Routine general medical examination at health care facility    Class 3 severe obesity due to excess calories with serious comorbidity and body mass index (BMI) of 45.0 to 49.9 in adult  Comments:  was going to start wegovy.  Cant get from pharmacy.  Tiry mounjaro  Orders:  -     tirzepatide (MOUNJARO) 2.5 mg/0.5 mL PnIj; Inject 2.5 mg into the skin every 7 days.    Bipolar 1 disorder, mixed, moderate  Comments:  stable over time.  continue meds    CHARLIE (obstructive sleep apnea)  Comments:  having hypersomnolence in  spite of cpap.  continue cpap, follow up with sleep          Follow up in about 6 months (around 12/6/2024).

## 2024-06-06 NOTE — TELEPHONE ENCOUNTER
The patient has requested a Vyvanse 30 mg refill.  [facility] indicates refills on April 3 and May 2.  The refill request is being authorized.

## 2024-06-09 ENCOUNTER — PATIENT MESSAGE (OUTPATIENT)
Dept: INTERNAL MEDICINE | Facility: CLINIC | Age: 24
End: 2024-06-09
Payer: OTHER GOVERNMENT

## 2024-06-24 ENCOUNTER — PATIENT MESSAGE (OUTPATIENT)
Dept: PSYCHIATRY | Facility: CLINIC | Age: 24
End: 2024-06-24
Payer: OTHER GOVERNMENT

## 2024-07-06 DIAGNOSIS — F90.9 ATTENTION DEFICIT DISORDER WITH HYPERACTIVITY: ICD-10-CM

## 2024-07-08 ENCOUNTER — PATIENT MESSAGE (OUTPATIENT)
Dept: OTOLARYNGOLOGY | Facility: CLINIC | Age: 24
End: 2024-07-08

## 2024-07-08 ENCOUNTER — PATIENT MESSAGE (OUTPATIENT)
Dept: PSYCHIATRY | Facility: CLINIC | Age: 24
End: 2024-07-08
Payer: OTHER GOVERNMENT

## 2024-07-08 ENCOUNTER — OFFICE VISIT (OUTPATIENT)
Dept: OTOLARYNGOLOGY | Facility: CLINIC | Age: 24
End: 2024-07-08
Payer: OTHER GOVERNMENT

## 2024-07-08 DIAGNOSIS — H61.23 BILATERAL IMPACTED CERUMEN: ICD-10-CM

## 2024-07-08 DIAGNOSIS — H60.91 OTITIS EXTERNA OF RIGHT EAR, UNSPECIFIED CHRONICITY, UNSPECIFIED TYPE: Primary | ICD-10-CM

## 2024-07-08 PROCEDURE — 99212 OFFICE O/P EST SF 10 MIN: CPT | Mod: PBBFAC,25 | Performed by: STUDENT IN AN ORGANIZED HEALTH CARE EDUCATION/TRAINING PROGRAM

## 2024-07-08 PROCEDURE — 99214 OFFICE O/P EST MOD 30 MIN: CPT | Mod: S$PBB,25,, | Performed by: STUDENT IN AN ORGANIZED HEALTH CARE EDUCATION/TRAINING PROGRAM

## 2024-07-08 PROCEDURE — 69210 REMOVE IMPACTED EAR WAX UNI: CPT | Mod: S$PBB,,, | Performed by: STUDENT IN AN ORGANIZED HEALTH CARE EDUCATION/TRAINING PROGRAM

## 2024-07-08 PROCEDURE — 99999 PR PBB SHADOW E&M-EST. PATIENT-LVL II: CPT | Mod: PBBFAC,,, | Performed by: STUDENT IN AN ORGANIZED HEALTH CARE EDUCATION/TRAINING PROGRAM

## 2024-07-08 PROCEDURE — 69210 REMOVE IMPACTED EAR WAX UNI: CPT | Mod: 25,PBBFAC | Performed by: STUDENT IN AN ORGANIZED HEALTH CARE EDUCATION/TRAINING PROGRAM

## 2024-07-08 RX ORDER — LISDEXAMFETAMINE DIMESYLATE 30 MG/1
30 CAPSULE ORAL EVERY MORNING
Qty: 30 CAPSULE | Refills: 0 | OUTPATIENT
Start: 2024-07-08

## 2024-07-08 RX ORDER — CIPROFLOXACIN HYDROCHLORIDE 3 MG/ML
4 SOLUTION/ DROPS OPHTHALMIC 2 TIMES DAILY
Qty: 10 ML | Refills: 0 | Status: SHIPPED | OUTPATIENT
Start: 2024-07-08 | End: 2024-08-02

## 2024-07-08 NOTE — PROGRESS NOTES
Chief complaint:   Chief Complaint   Patient presents with    Cerumen Impaction          Referring Provider:  No referring provider defined for this encounter.    History of Present Illness:     Mr. Turner is a 24 y.o. male presenting for evaluation of right ear fullness. Onset of this chief complaint was about a few weeks ago.  Additional symptoms that also have been associated are muffled hearing. The patient denies drainage, pain.  Treatment has included none     History        Past Medical History:   Past Medical History:   Diagnosis Date    ADD (attention deficit disorder with hyperactivity) 6/18/2013    Anxiety     Depression     .          Past Surgical History:  Past Surgical History:   Procedure Laterality Date    ADENOIDECTOMY      CIRCUMCISION      TONSILLECTOMY      TYMPANOSTOMY TUBE PLACEMENT     .         Medications: Medication list was reviewed. He  has a current medication list which includes the following prescription(s): fluoxetine, iron-vitamin c 100-250 mg (icar-c), ketoconazole, lisdexamfetamine, mounjaro, ziprasidone, and [DISCONTINUED] metformin.         Allergies:   Review of patient's allergies indicates:   Allergen Reactions    Aripiprazole Other (See Comments)     Some impulsive buying (Xbox: Dec 2022) and some wt gain            Family history: family history includes Crohn's disease in his mother; No Known Problems in his father. He was adopted.         Social History          Alcohol use:  reports no history of alcohol use.            Tobacco:  reports that he has never smoked. He has never been exposed to tobacco smoke. He has never used smokeless tobacco.         Please see the patient's intake form for full details of past medical history, past surgical history, family history, social history and review of systems. ?This information was reviewed by me and noted.      Physical Examination     General: Well developed, well nourished, well hydrated. Verbal with a strong voice and not  dysphonic.     Head/Face: Normocephalic, atraumatic. No scars or lesions. Facial musculature equal.     Eyes: No scleral icterus or conjunctival hemorrhage. EOMI. PERRLA.     Ears: see procedure    Hearing: grossly intact    Nose: No gross deformity or lesions. No purulent discharge. No significant NSD.      Mouth/Oropharynx: Lips without any lesions. No mucosal lesions within the oropharynx. No tonsillar exudate or lesions. Pharyngeal walls symmetrical. Uvula midline. Tongue midline without lesions.     Neck: Trachea midline. No masses. No thyromegaly or nodules palpated.     Lymphatic: No lymphadenopathy in the neck.     Extremities: No cyanosis. Warm and well-perfused.     Skin: No scars or lesions on face or neck.      Neurologic: Moving all extremities without gross abnormality.CN II-XII grossly intact. House-Brackmann 1/6. No signs of nystagmus.      Psych: Alert and oriented to person, place, and time with an appropriate mood and affect.     Data review:    Review of records:      I reviewed records from the referring provider's office visits.  These describe the history, workup, and/or treatment of this problem thus far.    Procedure: ear microscopy with removal of cerumen    Description: The patient was in agreement with the examination and debridement of the ears. Removal of the cerumen required use of an operating microscope and multiple micro-instruments.     With the patient in the supine position, we used the operating microscope to examine both ears with the appropriate sized ear speculum.  A variety of sterile, micro-instruments were utilized to remove the cerumen atraumatically.  I performed the procedure which required a significant amount of time and effort. The tympanic membrane was then well visualized.  The patient tolerated the procedure well and there were no complications.    Findings:   Right ear had significant wet wax and white drainage, the EAC was friable and inflamed, and the tympanic  membrane was intact with no evidence of middle ear fluid.    Left ear had moderate wax, the EAC was normal, and the tympanic membrane was intact with no evidence of middle ear fluid.         Assessment/Plan:      1. Otitis externa of right ear, unspecified chronicity, unspecified type    2. Bilateral impacted cerumen         Ciloxan x 10d  Keep ears dry  H2O2 for maintenance  Return to clinic 3 weeks for recheck      Rashawn Chambers MD  [facility]   [facility] - [address]  [place] [address]  P: [phone]  F: [phone]

## 2024-07-11 ENCOUNTER — PATIENT MESSAGE (OUTPATIENT)
Dept: PSYCHIATRY | Facility: CLINIC | Age: 24
End: 2024-07-11
Payer: OTHER GOVERNMENT

## 2024-07-11 DIAGNOSIS — F90.9 ATTENTION DEFICIT DISORDER WITH HYPERACTIVITY: Primary | ICD-10-CM

## 2024-07-11 RX ORDER — DEXTROAMPHETAMINE SACCHARATE, AMPHETAMINE ASPARTATE, DEXTROAMPHETAMINE SULFATE AND AMPHETAMINE SULFATE 2.5; 2.5; 2.5; 2.5 MG/1; MG/1; MG/1; MG/1
10 TABLET ORAL DAILY
Qty: 30 TABLET | Refills: 0 | Status: SHIPPED | OUTPATIENT
Start: 2024-07-11

## 2024-07-11 RX ORDER — DEXTROAMPHETAMINE SACCHARATE, AMPHETAMINE ASPARTATE MONOHYDRATE, DEXTROAMPHETAMINE SULFATE AND AMPHETAMINE SULFATE 5; 5; 5; 5 MG/1; MG/1; MG/1; MG/1
20 CAPSULE, EXTENDED RELEASE ORAL EVERY MORNING
Qty: 30 CAPSULE | Refills: 0 | Status: SHIPPED | OUTPATIENT
Start: 2024-07-11

## 2024-07-11 NOTE — TELEPHONE ENCOUNTER
The patient messaged with regards to a request for changing Vyvanse 30 mg back to Adderall XR 20 mg in the morning and IR 10 mg in the afternoon, due to insufficient efficacy of Vyvanse.   indicates that Vyvanse was last filled on June 7.  The changes being made.

## 2024-07-18 ENCOUNTER — OFFICE VISIT (OUTPATIENT)
Dept: PULMONOLOGY | Facility: CLINIC | Age: 24
End: 2024-07-18
Payer: OTHER GOVERNMENT

## 2024-07-18 VITALS
HEART RATE: 106 BPM | OXYGEN SATURATION: 96 % | DIASTOLIC BLOOD PRESSURE: 82 MMHG | WEIGHT: 315 LBS | BODY MASS INDEX: 40.43 KG/M2 | HEIGHT: 74 IN | RESPIRATION RATE: 19 BRPM | SYSTOLIC BLOOD PRESSURE: 134 MMHG

## 2024-07-18 DIAGNOSIS — G47.33 OSA (OBSTRUCTIVE SLEEP APNEA): Primary | ICD-10-CM

## 2024-07-18 DIAGNOSIS — E66.01 MORBID OBESITY WITH BMI OF 50.0-59.9, ADULT: ICD-10-CM

## 2024-07-18 PROCEDURE — 99214 OFFICE O/P EST MOD 30 MIN: CPT | Mod: PBBFAC,PO | Performed by: NURSE PRACTITIONER

## 2024-07-18 PROCEDURE — 99999 PR PBB SHADOW E&M-EST. PATIENT-LVL IV: CPT | Mod: PBBFAC,,, | Performed by: NURSE PRACTITIONER

## 2024-07-18 PROCEDURE — 99213 OFFICE O/P EST LOW 20 MIN: CPT | Mod: S$PBB,,, | Performed by: NURSE PRACTITIONER

## 2024-07-18 NOTE — PROGRESS NOTES
"Subjective:      Patient ID: Herminio Turner is a 24 y.o. male.    Chief Complaint: Sleep Apnea    HPI  Presents for sleep apnea on AutoPAP therapy. Patient states improved symptoms with use of AutoPAP. Sleeping more soundly. Waking up feeling more refreshed. Improved daytime sleepiness. Patient states he is benefiting from use of the AutoPAP.   He has had weight gain. BMI 52. In the process of trying to get weight loss medication covered.     Patient Active Problem List   Diagnosis    ADD (attention deficit disorder with hyperactivity)    Anxiety disorder    Bipolar 1 disorder (Jan 25 2022: referred in on Latuda 40 mg)    Gender identity issues; in adol identified some  as female tho parents in record report that actauly no attraction to either male or female    Autism disorder ( formerly Asperger)    Bipolar 1 disorder, mixed, moderate    Class 3 severe obesity due to excess calories with serious comorbidity and body mass index (BMI) of 45.0 to 49.9 in adult    NAFLD (nonalcoholic fatty liver disease)     /82   Pulse 106   Resp 19   Ht 6' 2" (1.88 m)   Wt (!) 185.4 kg (408 lb 11.7 oz)   SpO2 96%   BMI 52.48 kg/m²   Body mass index is 52.48 kg/m².    Review of Systems   Constitutional:  Positive for weight gain.   HENT: Negative.     Respiratory: Negative.     Cardiovascular: Negative.    Musculoskeletal: Negative.    Gastrointestinal: Negative.    Neurological: Negative.    Psychiatric/Behavioral: Negative.       Objective:      Physical Exam  Constitutional:       Appearance: He is obese.   HENT:      Head: Normocephalic and atraumatic.      Nose: Nose normal.   Cardiovascular:      Rate and Rhythm: Normal rate and regular rhythm.   Pulmonary:      Effort: Pulmonary effort is normal.      Breath sounds: Normal breath sounds.   Abdominal:      Palpations: Abdomen is soft.      Tenderness: There is no abdominal tenderness.   Musculoskeletal:         General: Normal range of motion.      Cervical " back: Normal range of motion and neck supple.   Skin:     General: Skin is warm and dry.   Neurological:      General: No focal deficit present.      Mental Status: He is alert and oriented to person, place, and time.   Psychiatric:         Mood and Affect: Mood normal.         Behavior: Behavior normal.       Personal Diagnostic Review      7/18/2024     2:51 PM   EPWORTH SLEEPINESS SCALE   Sitting and reading 2   Watching TV 1   Sitting, inactive in a public place (e.g. a theatre or a meeting) 3   As a passenger in a car for an hour without a break 2   Lying down to rest in the afternoon when circumstances permit 3   Sitting and talking to someone 1   Sitting quietly after a lunch without alcohol 1   In a car, while stopped for a few minutes in traffic 0   Total score 13        Assessment:       1. CHARLIE (obstructive sleep apnea)    2. Morbid obesity with BMI of 50.0-59.9, adult        Outpatient Encounter Medications as of 7/18/2024   Medication Sig Dispense Refill    ciprofloxacin HCl (CILOXAN) 0.3 % ophthalmic solution Place 4 drops into the right ear 2 (two) times a day. for 10 days 10 mL 0    dextroamphetamine-amphetamine (ADDERALL XR) 20 MG 24 hr capsule Take 1 capsule (20 mg total) by mouth every morning. 30 capsule 0    dextroamphetamine-amphetamine 10 mg Tab Take 1 tablet (10 mg total) by mouth Daily. In the afternoon. 30 tablet 0    FLUoxetine 20 MG capsule Take 1 capsule (20 mg total) by mouth once daily. 90 capsule 0    iron-vitamin C 100-250 mg, ICAR-C, (ICAR-C) 100-250 mg Tab Take 1 tablet by mouth once daily. 90 tablet 3    ketoconazole (NIZORAL) 2 % shampoo Apply topically 3 (three) times a week. 120 mL 2    tirzepatide (MOUNJARO) 2.5 mg/0.5 mL PnIj Inject 2.5 mg into the skin every 7 days. 4 Pen 0    ziprasidone (GEODON) 80 MG capsule Take 1 capsule (80 mg total) by mouth once daily. 90 capsule 0    [DISCONTINUED] lisdexamfetamine (VYVANSE) 30 MG capsule Take 1 capsule (30 mg total) by mouth every  morning. 30 capsule 0    [DISCONTINUED] metFORMIN (GLUCOPHAGE-XR) 500 MG ER 24hr tablet Take 1 tablet by mouth in the morning and 1 tablet before bedtime. Do not crush, chew, or split.. 60 tablet 1     No facility-administered encounter medications on file as of 7/18/2024.     Orders Placed This Encounter   Procedures    CPAP/BIPAP SUPPLIES     Order Specific Question:   Length of need (1-99 months):     Answer:   99     Order Specific Question:   Choose ONE mask type and its corresponding cushions and/or pillows:     Answer:    Full Face Mask, 1 per 90 days:  Full Face Cushion, (3 per 90 days)     Order Specific Question:   Choose EITHER Heated or Non-Heated Tubjing     Answer:    Non-Heated Tubing, 1 per 90 days     Order Specific Question:   All other supplies as needed as listed below:     Answer:    Headgear, 1 per 180 days     Order Specific Question:   All other supplies as needed as listed below:     Answer:    Disposable Filter, 6 per 90 days     Order Specific Question:   All other supplies as needed as listed below:     Answer:    Non-Disposable Filter, 1 per 180 days     Order Specific Question:   All other supplies as needed as listed below:     Answer:    Humidifier Chamber, 1 per 180 days     Plan:   Compliant with PAP and benefits from use. Follow up annually in the sleep clinic.  1. CHARLIE (obstructive sleep apnea)  -     CPAP/BIPAP SUPPLIES    2. Morbid obesity with BMI of 50.0-59.9, adult    Weight loss and exercise to improve overall health.                      Elizabeth LeJeune, ACNP, ANP

## 2024-07-22 DIAGNOSIS — F31.9 BIPOLAR AFFECTIVE DISORDER, REMISSION STATUS UNSPECIFIED: ICD-10-CM

## 2024-07-22 RX ORDER — ZIPRASIDONE HYDROCHLORIDE 80 MG/1
80 CAPSULE ORAL DAILY
Qty: 90 CAPSULE | Refills: 0 | OUTPATIENT
Start: 2024-07-22

## 2024-07-24 DIAGNOSIS — F31.9 BIPOLAR AFFECTIVE DISORDER, REMISSION STATUS UNSPECIFIED: ICD-10-CM

## 2024-07-25 ENCOUNTER — TELEPHONE (OUTPATIENT)
Dept: PSYCHIATRY | Facility: CLINIC | Age: 24
End: 2024-07-25
Payer: OTHER GOVERNMENT

## 2024-07-25 RX ORDER — ZIPRASIDONE HYDROCHLORIDE 80 MG/1
80 CAPSULE ORAL DAILY
Qty: 90 CAPSULE | Refills: 0 | OUTPATIENT
Start: 2024-07-25

## 2024-07-27 NOTE — PROGRESS NOTES
Herminio Telly Turner   2000 07/29/2024        CURRENT PRESENTATION:   The patient presents for follow-up of bipolar disorder, anxiety, ADHD, autism, and concerns regarding obesity.  In recent times, and effort to switch the stimulant to Vyvanse to help with appetite was successful that Vyvanse was not as beneficial ADHD as Adderall has been.  Prozac is being weaned and is currently at 20 mg daily, and the patient continues on Geodon 80 mg daily.     indicates that 30 capsules and 30 tablets of Adderall XR 20 mg and Adderall IR 10 mg were filled on July 15 and July 16.    In the current session, the patient presents with his mother at his request and with his consent.  He reports ADHD inattentive and hyperactivity symptoms are notably better controlled on Adderall as compared to Vyvanse, with no side effects, but inattention and impulsive speech continue.  His mother fully concurs.  Extensive and specific questioning yields no dora or hypomania, and his mother concurs.  He denies depression, reports good control of anxiety, although he says that he has become unused to going out and so has some anxiety in this regard.  He and his mother comment that he succeeded in school and was able to work for 1 year in a  job in the goal of the patient and his parents are for him to once again get a job, with the patient adding that with a job he would want to ultimately have his own place.  He reports that he continues to go to counseling, but the focus is on autism symptoms and not managing anxiety, relationship and issues, goal setting, etc..    Never with suicidal ideation, thoughts of self-harm, homicidal ideation, thoughts of harm towards others, feelings of aggression, or psychosis.  He reports sleeping from between midnight and 2:00 a.m. until 8:00 a.m., though often with a lengthy nap later in the day.  No excessive spending, though he spends the money he has on computer equipment and usama.   Regarding alcohol, not much, with 1 or 2 beverages on uncommon occasions and no cravings for more.  No other substances.    Including with specific questioning, no side effects of Geodon or Prozac.  No subjective or objective dyskinetic movements, and AIMS 0.    Interim history:  Living situation/supports:  No change.  Grandmother and dog are doing well.  His father's current sales job is for [employer].  Previously-  He sees his grandparents at least once a week and his aunt next door almost daily.  He reports that he is getting along with his parents and with his 17-year-old sister.    Medical issues:  Bariatric visit at [facility].  Mounjaro started by internal medicine.  The patient in his mother indicates that they are awaiting insurance approval for either Wegovy or Mounjaro.  Pulmonology for CHARLIE  Nonpsychotropic Medications:  None currently  Allergies:         Review of patient's allergies indicates:   Allergen Reactions    Abilify [aripiprazole] Other (See Comments)       Some impulsive buying (Xbox: Dec 2022) and some wt gain      Alcohol use:  1-2 drinks on occasions, without risk or sequelae, and no craving otherwise  Other substance use:  None and no craving    Mental Status Exam:   Appearance:  Appropriately groomed  Orientation:  X4  Attitude:  Cooperative, engaged   Eye Contact:  Appropriate  Behavior:  Calm, appropriate  Speech:     Rate - WNL    Volume - WNL    Quantity - WNL    Tone - relaxed, appropriate  Pressure - no  Thought Processes:  Goal-directed  Mood:  Euthymic   Affect:  Without distress, euthymic, including ability to brighten at appropriate times  SI:  No, and no thoughts of self-harm  HI:  No, and no thoughts of harm towards others  Paranoia:  No  Delusions:  No  Hallucinations:  No  Attention:  Decreased at times over the course of the session  Cognition:  No deficits noted over the course of the session  Insight:  Intact   Judgment:  Intact  Impulse Control:  Intact         ASSESSMENT:   Encounter Diagnoses   Name Primary?    Bipolar affective disorder, remission status unspecified Yes    Anxiety disorder, unspecified type     Attention deficit disorder with hyperactivity     Binge eating disorder     Autism disorder          PLAN:     Follow up in 6 weeks.  The patient is continuing with his autism therapist, but also seek mental health counseling for broader issues.      Psychiatry Medication:  Discontinue Prozac as previously discussed and with no current issues facing the patient.  Continue Geodon 80 mg daily.  Increase Adderall XR to 40 mg in the morning, with IR 10 mg in the afternoon continuing.  An Adderall XR 20 mg prescription was filled on July 15, and a prescription is being written for 16 capsules to complete the current 30 days of Adderall XR 20 mg therapy.  The patient message in 2 weeks regarding efficacy and tolerability, for his next refill of Adderall.      Reviewed with patient:  Report side effects, other problems, or questions to the psychiatrist by way of the [employer] portal, [facility], or by calling [facility] at [phone].  Messages are checked during clinic hours only.  For urgent issues outside of clinic hours, call 911 or go to an emergency department.  Follow up with primary care/MD specialist for continued monitoring of general health and wellness and any medical conditions.  Call [facility] at [phone] or use the [facility] portal if necessary for scheduling or rescheduling.  It is the responsibility of the patient to reschedule an appointment if an appointment has been canceled or missed.  Understanding was expressed; and no further concerns or questions were raised at this time.     [address]  2 or more stable chronic illnesses and Prescription drug management      Large portions of this note were completed by way of voice recognition dictation software, and transcription errors are possible, such that specific information  in the note should be considered in the context of the entire report.

## 2024-07-29 ENCOUNTER — OFFICE VISIT (OUTPATIENT)
Dept: OTOLARYNGOLOGY | Facility: CLINIC | Age: 24
End: 2024-07-29
Payer: OTHER GOVERNMENT

## 2024-07-29 ENCOUNTER — PATIENT MESSAGE (OUTPATIENT)
Dept: PSYCHIATRY | Facility: CLINIC | Age: 24
End: 2024-07-29
Payer: OTHER GOVERNMENT

## 2024-07-29 ENCOUNTER — OFFICE VISIT (OUTPATIENT)
Dept: PSYCHIATRY | Facility: CLINIC | Age: 24
End: 2024-07-29
Payer: OTHER GOVERNMENT

## 2024-07-29 VITALS — HEART RATE: 96 BPM | SYSTOLIC BLOOD PRESSURE: 121 MMHG | DIASTOLIC BLOOD PRESSURE: 79 MMHG

## 2024-07-29 VITALS — WEIGHT: 315 LBS | HEIGHT: 74 IN | BODY MASS INDEX: 40.43 KG/M2

## 2024-07-29 DIAGNOSIS — F31.9 BIPOLAR AFFECTIVE DISORDER, REMISSION STATUS UNSPECIFIED: Primary | ICD-10-CM

## 2024-07-29 DIAGNOSIS — F90.9 ATTENTION DEFICIT DISORDER WITH HYPERACTIVITY: ICD-10-CM

## 2024-07-29 DIAGNOSIS — F41.9 ANXIETY DISORDER, UNSPECIFIED TYPE: ICD-10-CM

## 2024-07-29 DIAGNOSIS — H60.91 OTITIS EXTERNA OF RIGHT EAR, UNSPECIFIED CHRONICITY, UNSPECIFIED TYPE: Primary | ICD-10-CM

## 2024-07-29 DIAGNOSIS — H61.23 BILATERAL IMPACTED CERUMEN: ICD-10-CM

## 2024-07-29 DIAGNOSIS — F84.0 AUTISM DISORDER: ICD-10-CM

## 2024-07-29 DIAGNOSIS — F50.81 BINGE EATING DISORDER: ICD-10-CM

## 2024-07-29 PROCEDURE — 99213 OFFICE O/P EST LOW 20 MIN: CPT | Mod: 25,S$PBB,, | Performed by: PHYSICIAN ASSISTANT

## 2024-07-29 PROCEDURE — 69210 REMOVE IMPACTED EAR WAX UNI: CPT | Mod: S$PBB,,, | Performed by: PHYSICIAN ASSISTANT

## 2024-07-29 PROCEDURE — 99213 OFFICE O/P EST LOW 20 MIN: CPT | Mod: PBBFAC,25 | Performed by: PHYSICIAN ASSISTANT

## 2024-07-29 PROCEDURE — 99212 OFFICE O/P EST SF 10 MIN: CPT | Mod: PBBFAC,27,25 | Performed by: PSYCHIATRY & NEUROLOGY

## 2024-07-29 PROCEDURE — 69210 REMOVE IMPACTED EAR WAX UNI: CPT | Mod: 50,PBBFAC | Performed by: PHYSICIAN ASSISTANT

## 2024-07-29 PROCEDURE — 99999 PR PBB SHADOW E&M-EST. PATIENT-LVL III: CPT | Mod: PBBFAC,,, | Performed by: PHYSICIAN ASSISTANT

## 2024-07-29 PROCEDURE — 99999 PR PBB SHADOW E&M-EST. PATIENT-LVL II: CPT | Mod: PBBFAC,,, | Performed by: PSYCHIATRY & NEUROLOGY

## 2024-07-29 PROCEDURE — 99214 OFFICE O/P EST MOD 30 MIN: CPT | Mod: S$PBB,,, | Performed by: PSYCHIATRY & NEUROLOGY

## 2024-07-29 RX ORDER — ZIPRASIDONE HYDROCHLORIDE 80 MG/1
80 CAPSULE ORAL DAILY
Qty: 90 CAPSULE | Refills: 0 | Status: SHIPPED | OUTPATIENT
Start: 2024-07-29

## 2024-07-29 RX ORDER — DEXTROAMPHETAMINE SACCHARATE, AMPHETAMINE ASPARTATE MONOHYDRATE, DEXTROAMPHETAMINE SULFATE AND AMPHETAMINE SULFATE 5; 5; 5; 5 MG/1; MG/1; MG/1; MG/1
40 CAPSULE, EXTENDED RELEASE ORAL EVERY MORNING
Qty: 16 CAPSULE | Refills: 0 | Status: SHIPPED | OUTPATIENT
Start: 2024-07-29

## 2024-07-29 NOTE — PROGRESS NOTES
"Subjective:   Patient ID: Herminio Turner is a 24 y.o. male.    Chief Complaint: Follow-up (Pt is coming in for R ear check due to ear infection.)    Mr. Turner is a very pleasant 25 yo male here to see me today for follow up of right OE and bilateral impacted cerumen. He has been using topical antibiotic ear drops and feels that his ar much improved.     Follow-up      Review of patient's allergies indicates:   Allergen Reactions    Aripiprazole Other (See Comments)     Some impulsive buying (Xbox: Dec 2022) and some wt gain           Review of Systems   Constitutional: Negative.    HENT: Negative.     Eyes: Negative.    Respiratory: Negative.     Cardiovascular: Negative.    Gastrointestinal: Negative.    Endocrine: Negative.    Genitourinary: Negative.    Musculoskeletal: Negative.    Skin: Negative.    Allergic/Immunologic: Negative.    Neurological: Negative.    Hematological: Negative.    Psychiatric/Behavioral: Negative.           Objective:   Ht 6' 2.4" (1.89 m)   Wt (!) 185.4 kg (408 lb 11.7 oz)   BMI 51.92 kg/m²     Physical Exam  Constitutional:       General: He is not in acute distress.     Appearance: He is well-developed.   HENT:      Head: Normocephalic and atraumatic.      Jaw: No trismus.      Right Ear: Hearing, tympanic membrane, ear canal and external ear normal. There is impacted cerumen (suctioned).      Left Ear: Hearing, tympanic membrane, ear canal and external ear normal. There is impacted cerumen (suctioned).      Ears:      Comments: Moist ear canals bilaterally     Nose: Nose normal. No nasal deformity, septal deviation, mucosal edema or rhinorrhea.      Mouth/Throat:      Dentition: Normal dentition.      Pharynx: Uvula midline. No oropharyngeal exudate or uvula swelling.   Eyes:      General: No scleral icterus.     Conjunctiva/sclera: Conjunctivae normal.      Right eye: Right conjunctiva is not injected. No chemosis.     Left eye: Left conjunctiva is not injected. No " chemosis.     Pupils: Pupils are equal, round, and reactive to light.   Neck:      Thyroid: No thyroid mass or thyromegaly.      Trachea: Trachea and phonation normal. No tracheal tenderness or tracheal deviation.   Pulmonary:      Effort: Pulmonary effort is normal. No accessory muscle usage or respiratory distress.      Breath sounds: No stridor.   Lymphadenopathy:      Head:      Right side of head: No submental, submandibular, preauricular or posterior auricular adenopathy.      Left side of head: No submental, submandibular, preauricular or posterior auricular adenopathy.      Cervical: No cervical adenopathy.      Right cervical: No superficial or deep cervical adenopathy.     Left cervical: No superficial or deep cervical adenopathy.   Skin:     General: Skin is warm and dry.      Findings: No erythema or rash.   Neurological:      Mental Status: He is alert and oriented to person, place, and time.      Cranial Nerves: No cranial nerve deficit.   Psychiatric:         Behavior: Behavior normal.         Thought Content: Thought content normal.          Procedure Note    CHIEF COMPLAINT:  Cerumen Impaction    Description:  The patient was seated in an exam chair.  An ear speculum was placed in the right EAC and was examined under the microscope.  Suction and/or loop curettes were used to remove a large cerumen impaction.  The tympanic membrane was visualized and was normal in appearance.  The procedure was repeated on the left side in a similar fashion.  The TM was intact and normal on this side as well.  The patient tolerated the procedure well.     Assessment:     1. Otitis externa of right ear, unspecified chronicity, unspecified type    2. Bilateral impacted cerumen        Plan:     Otitis externa of right ear, unspecified chronicity, unspecified type    Bilateral impacted cerumen       Cerumen impaction:  Removed today without difficulty.  I would recommend the use of a wax softening drop, either over the  counter Debrox or mineral oil, on a weekly basis.  I also instructed the patient to avoid Qtips. Would recommend routine clean ing every 6 months.    OE: resolved. Discussed ry ear precautions.

## 2024-08-06 ENCOUNTER — PATIENT MESSAGE (OUTPATIENT)
Dept: PSYCHIATRY | Facility: CLINIC | Age: 24
End: 2024-08-06
Payer: OTHER GOVERNMENT

## 2024-08-06 DIAGNOSIS — F90.9 ATTENTION DEFICIT DISORDER WITH HYPERACTIVITY: Primary | ICD-10-CM

## 2024-08-07 RX ORDER — DEXTROAMPHETAMINE SACCHARATE, AMPHETAMINE ASPARTATE, DEXTROAMPHETAMINE SULFATE AND AMPHETAMINE SULFATE 5; 5; 5; 5 MG/1; MG/1; MG/1; MG/1
1 TABLET ORAL DAILY
Qty: 30 TABLET | Refills: 0 | Status: SHIPPED | OUTPATIENT
Start: 2024-08-07

## 2024-08-07 RX ORDER — DEXTROAMPHETAMINE SACCHARATE, AMPHETAMINE ASPARTATE MONOHYDRATE, DEXTROAMPHETAMINE SULFATE AND AMPHETAMINE SULFATE 7.5; 7.5; 7.5; 7.5 MG/1; MG/1; MG/1; MG/1
30 CAPSULE, EXTENDED RELEASE ORAL EVERY MORNING
Qty: 30 CAPSULE | Refills: 0 | Status: SHIPPED | OUTPATIENT
Start: 2024-08-07

## 2024-08-16 ENCOUNTER — PATIENT MESSAGE (OUTPATIENT)
Dept: PSYCHIATRY | Facility: CLINIC | Age: 24
End: 2024-08-16
Payer: OTHER GOVERNMENT

## 2024-08-24 ENCOUNTER — PATIENT MESSAGE (OUTPATIENT)
Dept: PSYCHIATRY | Facility: CLINIC | Age: 24
End: 2024-08-24
Payer: OTHER GOVERNMENT

## 2024-08-24 NOTE — TELEPHONE ENCOUNTER
The patient's father messaged:   Herminio needs a sufficiency statement to renew his ID card which he needs for  coverage.  I would be grateful if you could please provide a statement statin) Herminio is incapable of self support  2) The age his condition was 1st diagnosed  3) Whether or not condition and incapability of self support is permanent  4) Activities of Daily Living that the child requires assistance with or cannot do at all    The patient's parents provided information (a letter in the thread of the messages and in media) to be considered in the sufficiency statement.      The letter is being prepared and will be sent to the patient through the portal.

## 2024-08-29 ENCOUNTER — PATIENT MESSAGE (OUTPATIENT)
Dept: PSYCHIATRY | Facility: CLINIC | Age: 24
End: 2024-08-29
Payer: OTHER GOVERNMENT

## 2024-08-29 DIAGNOSIS — F90.9 ATTENTION DEFICIT DISORDER WITH HYPERACTIVITY: ICD-10-CM

## 2024-08-30 RX ORDER — DEXTROAMPHETAMINE SACCHARATE, AMPHETAMINE ASPARTATE, DEXTROAMPHETAMINE SULFATE AND AMPHETAMINE SULFATE 5; 5; 5; 5 MG/1; MG/1; MG/1; MG/1
1 TABLET ORAL 2 TIMES DAILY
Qty: 60 TABLET | Refills: 0 | Status: SHIPPED | OUTPATIENT
Start: 2024-08-30

## 2024-08-30 NOTE — TELEPHONE ENCOUNTER
Message received from the patient:   Good afternoon Dr. Beltran, I'm noticing that when I take my extended release of my Adderall it isn't as effective as my instant release. I feel before I take my instant release and just have the extended release it does little to nothing to my concentration and after I take the 20mg of my instant release I calm down and can focus. I was wondering what was your take on this and if you wanted to alter my meds or keep them the same until I see you next. Thanks.      Prescriptions were authorized on August 7 for Adderall XR 30 mg and IR 20 mg 30 tablets, with  indicating refills on August 7 for XR 30 mg and August 15 for IR 20 mg.  A message has been sent to the patient:

## 2024-09-19 ENCOUNTER — OFFICE VISIT (OUTPATIENT)
Dept: PSYCHIATRY | Facility: CLINIC | Age: 24
End: 2024-09-19
Payer: OTHER GOVERNMENT

## 2024-09-19 ENCOUNTER — OFFICE VISIT (OUTPATIENT)
Dept: HEPATOLOGY | Facility: CLINIC | Age: 24
End: 2024-09-19
Payer: OTHER GOVERNMENT

## 2024-09-19 VITALS — HEIGHT: 74 IN | BODY MASS INDEX: 40.43 KG/M2 | WEIGHT: 315 LBS

## 2024-09-19 DIAGNOSIS — F50.81 BINGE EATING DISORDER: ICD-10-CM

## 2024-09-19 DIAGNOSIS — F90.9 ATTENTION DEFICIT DISORDER WITH HYPERACTIVITY: ICD-10-CM

## 2024-09-19 DIAGNOSIS — F41.9 ANXIETY DISORDER, UNSPECIFIED TYPE: ICD-10-CM

## 2024-09-19 DIAGNOSIS — K76.0 METABOLIC DYSFUNCTION-ASSOCIATED STEATOTIC LIVER DISEASE (MASLD): Primary | ICD-10-CM

## 2024-09-19 DIAGNOSIS — F31.9 BIPOLAR AFFECTIVE DISORDER, REMISSION STATUS UNSPECIFIED: Primary | ICD-10-CM

## 2024-09-19 PROCEDURE — 99999 PR PBB SHADOW E&M-EST. PATIENT-LVL I: CPT | Mod: PBBFAC,,, | Performed by: PSYCHIATRY & NEUROLOGY

## 2024-09-19 PROCEDURE — 99214 OFFICE O/P EST MOD 30 MIN: CPT | Mod: S$PBB,,, | Performed by: PSYCHIATRY & NEUROLOGY

## 2024-09-19 PROCEDURE — 99211 OFF/OP EST MAY X REQ PHY/QHP: CPT | Mod: PBBFAC | Performed by: PSYCHIATRY & NEUROLOGY

## 2024-09-19 PROCEDURE — 99213 OFFICE O/P EST LOW 20 MIN: CPT | Mod: 95,,, | Performed by: INTERNAL MEDICINE

## 2024-09-19 RX ORDER — ZIPRASIDONE HYDROCHLORIDE 80 MG/1
80 CAPSULE ORAL DAILY
Qty: 90 CAPSULE | Refills: 0 | Status: SHIPPED | OUTPATIENT
Start: 2024-09-19

## 2024-09-19 NOTE — Clinical Note
He needs the miscellaneous test done now and then 6 months f/u with NP HO in 6 months with preclinic CBC and CMP
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
## 2024-09-19 NOTE — PROGRESS NOTES
Subjective:     Herminio Turner is here for follow up of Fatty Liver    Patient is new to me    HPI  Since Herminio Turner's last visit he has continued to work with bariatric surgery at [facility] to lose weight. He has not lost any significant weight. Overall he is feeling well w/o any acute issues.    Review of Systems    Objective:     Physical Exam    MELD 3.0: 6 at 11/7/2023  8:30 AM  MELD-Na: 6 at 11/7/2023  8:30 AM  Calculated from:  Serum Creatinine: 0.6 mg/dL (Using min of 1 mg/dL) at 11/7/2023  8:30 AM  Serum Sodium: 138 mmol/L (Using max of 137 mmol/L) at 11/7/2023  8:30 AM  Total Bilirubin: 0.3 mg/dL (Using min of 1 mg/dL) at 11/7/2023  8:30 AM  Serum Albumin: 3.8 g/dL (Using max of 3.5 g/dL) at 11/7/2023  8:30 AM  INR(ratio): 1.0 at 11/7/2023  8:30 AM  Age at listing (hypothetical): 23 years  Sex: Male at 11/7/2023  8:30 AM      WBC   Date Value Ref Range Status   03/21/2024 7.17 3.90 - 12.70 K/uL Final     Hemoglobin   Date Value Ref Range Status   03/21/2024 13.4 (L) 14.0 - 18.0 g/dL Final     Hematocrit   Date Value Ref Range Status   03/21/2024 41.0 40.0 - 54.0 % Final     Platelets   Date Value Ref Range Status   03/21/2024 293 150 - 450 K/uL Final     BUN   Date Value Ref Range Status   11/07/2023 13 6 - 20 mg/dL Final     Creatinine   Date Value Ref Range Status   11/07/2023 0.6 0.5 - 1.4 mg/dL Final     Glucose   Date Value Ref Range Status   11/07/2023 102 70 - 110 mg/dL Final     Calcium   Date Value Ref Range Status   11/07/2023 9.6 8.7 - 10.5 mg/dL Final     Sodium   Date Value Ref Range Status   11/07/2023 138 136 - 145 mmol/L Final     Potassium   Date Value Ref Range Status   11/07/2023 3.7 3.5 - 5.1 mmol/L Final     Chloride   Date Value Ref Range Status   11/07/2023 105 95 - 110 mmol/L Final     AST   Date Value Ref Range Status   03/21/2024 44 (H) 10 - 40 U/L Final     ALT   Date Value Ref Range Status   03/21/2024 88 (H) 10 - 44 U/L Final     Alkaline Phosphatase   Date Value Ref  Range Status   03/21/2024 98 55 - 135 U/L Final     Total Bilirubin   Date Value Ref Range Status   03/21/2024 0.5 0.1 - 1.0 mg/dL Final     Comment:     For infants and newborns, interpretation of results should be based  on gestational age, weight and in agreement with clinical  observations.    Premature Infant recommended reference ranges:  Up to 24 hours.............<8.0 mg/dL  Up to 48 hours............<12.0 mg/dL  3-5 days..................<15.0 mg/dL  6-29 days.................<15.0 mg/dL       Albumin   Date Value Ref Range Status   03/21/2024 3.8 3.5 - 5.2 g/dL Final     INR   Date Value Ref Range Status   11/07/2023 1.0 0.8 - 1.2 Final     Comment:     Coumadin Therapy:  2.0 - 3.0 for INR for all indicators except mechanical heart valves  and antiphospholipid syndromes which should use 2.5 - 3.5.           Assessment/Plan:     1. Metabolic dysfunction-associated steatotic liver disease (MASLD)      Herminio Turner is a 24 y.o. male withFatty Liver    Metabolic dysfunction-associated steatotic liver disease (MASLD)-concerned patient at high risk for MASH  -Recommend trying to stage with ELF, if not possible then will consider liver bx  -He may benefit from Rezdiffra based on staging  -Continue to work with [facility] auguste to lose weight  -     Comprehensive Metabolic Panel; Future; Expected date: 09/19/2024  -     CBC Auto Differential; Future; Expected date: 09/19/2024  -     Misc Sendout Test, Blood Enhanced liver fibrosis test; Future; Expected date: 09/19/2024    RTC in 6 months with preclinic labs    The patient location is: [place]  The chief complaint leading to consultation is: See above in note    Visit type: audiovisual    Face to Face time with patient: 10 minutes  20 minutes of total time spent on the encounter, which includes face to face time and non-face to face time preparing to see the patient (eg, review of tests), Obtaining and/or reviewing separately obtained history, Documenting  clinical information in the electronic or other health record, Independently interpreting results (not separately reported) and communicating results to the patient/family/caregiver, or Care coordination (not separately reported).         Each patient to whom he or she provides medical services by telemedicine is:  (1) informed of the relationship between the physician and patient and the respective role of any other health care provider with respect to management of the patient; and (2) notified that he or she may decline to receive medical services by telemedicine and may withdraw from such care at any time.        Christina Gary MD

## 2024-09-24 ENCOUNTER — LAB VISIT (OUTPATIENT)
Dept: LAB | Facility: HOSPITAL | Age: 24
End: 2024-09-24
Attending: INTERNAL MEDICINE
Payer: OTHER GOVERNMENT

## 2024-09-24 DIAGNOSIS — K76.0 METABOLIC DYSFUNCTION-ASSOCIATED STEATOTIC LIVER DISEASE (MASLD): ICD-10-CM

## 2024-09-24 LAB — MISCELLANEOUS TEST NAME: NORMAL

## 2024-09-24 PROCEDURE — 36415 COLL VENOUS BLD VENIPUNCTURE: CPT | Mod: PO | Performed by: INTERNAL MEDICINE

## 2024-10-01 ENCOUNTER — PATIENT MESSAGE (OUTPATIENT)
Dept: HEPATOLOGY | Facility: CLINIC | Age: 24
End: 2024-10-01
Payer: OTHER GOVERNMENT

## 2024-10-01 LAB
MISCELLANEOUS TEST NAME: NORMAL
REFERENCE LAB: NORMAL
SPECIMEN TYPE: NORMAL
TEST RESULT: NORMAL

## 2024-10-10 ENCOUNTER — PATIENT MESSAGE (OUTPATIENT)
Dept: PSYCHIATRY | Facility: CLINIC | Age: 24
End: 2024-10-10
Payer: OTHER GOVERNMENT

## 2024-10-10 DIAGNOSIS — F90.9 ATTENTION DEFICIT DISORDER WITH HYPERACTIVITY: Primary | ICD-10-CM

## 2024-10-10 RX ORDER — DEXTROAMPHETAMINE SACCHARATE, AMPHETAMINE ASPARTATE, DEXTROAMPHETAMINE SULFATE AND AMPHETAMINE SULFATE 7.5; 7.5; 7.5; 7.5 MG/1; MG/1; MG/1; MG/1
30 TABLET ORAL 2 TIMES DAILY
Qty: 60 TABLET | Refills: 0 | Status: SHIPPED | OUTPATIENT
Start: 2024-10-10

## 2024-11-17 DIAGNOSIS — F90.9 ATTENTION DEFICIT DISORDER WITH HYPERACTIVITY: ICD-10-CM

## 2024-11-18 RX ORDER — DEXTROAMPHETAMINE SACCHARATE, AMPHETAMINE ASPARTATE, DEXTROAMPHETAMINE SULFATE AND AMPHETAMINE SULFATE 7.5; 7.5; 7.5; 7.5 MG/1; MG/1; MG/1; MG/1
30 TABLET ORAL 2 TIMES DAILY
Qty: 60 TABLET | Refills: 0 | Status: SHIPPED | OUTPATIENT
Start: 2024-11-18

## 2024-12-04 ENCOUNTER — PATIENT MESSAGE (OUTPATIENT)
Dept: INTERNAL MEDICINE | Facility: CLINIC | Age: 24
End: 2024-12-04
Payer: OTHER GOVERNMENT

## 2024-12-13 ENCOUNTER — TELEPHONE (OUTPATIENT)
Dept: PSYCHIATRY | Facility: CLINIC | Age: 24
End: 2024-12-13
Payer: OTHER GOVERNMENT

## 2024-12-16 ENCOUNTER — OFFICE VISIT (OUTPATIENT)
Dept: OTOLARYNGOLOGY | Facility: CLINIC | Age: 24
End: 2024-12-16
Payer: OTHER GOVERNMENT

## 2024-12-16 VITALS — HEIGHT: 74 IN | BODY MASS INDEX: 40.43 KG/M2 | WEIGHT: 315 LBS

## 2024-12-16 DIAGNOSIS — H61.23 BILATERAL IMPACTED CERUMEN: Primary | ICD-10-CM

## 2024-12-16 PROCEDURE — 99999 PR PBB SHADOW E&M-EST. PATIENT-LVL III: CPT | Mod: PBBFAC,,, | Performed by: PHYSICIAN ASSISTANT

## 2024-12-16 PROCEDURE — 99499 UNLISTED E&M SERVICE: CPT | Mod: 25,S$PBB,, | Performed by: PHYSICIAN ASSISTANT

## 2024-12-16 PROCEDURE — 69210 REMOVE IMPACTED EAR WAX UNI: CPT | Mod: PBBFAC | Performed by: PHYSICIAN ASSISTANT

## 2024-12-16 PROCEDURE — 69210 REMOVE IMPACTED EAR WAX UNI: CPT | Mod: S$PBB,,, | Performed by: PHYSICIAN ASSISTANT

## 2024-12-16 PROCEDURE — 99213 OFFICE O/P EST LOW 20 MIN: CPT | Mod: PBBFAC,25 | Performed by: PHYSICIAN ASSISTANT

## 2024-12-16 NOTE — PROGRESS NOTES
"Subjective:   Patient ID: Herminio Turner is a 24 y.o. male.    Chief Complaint: Cerumen Impaction (Pt is coming in today for ear cleaning )    Mr. Turner is a very pleasant 25 yo male here to see me today for cleaning.     Follow-up      Review of patient's allergies indicates:   Allergen Reactions    Aripiprazole Other (See Comments)     Some impulsive buying (Xbox: Dec 2022) and some wt gain           Review of Systems   Constitutional: Negative.    HENT:  Positive for hearing loss.    Eyes: Negative.    Respiratory: Negative.     Cardiovascular: Negative.    Gastrointestinal: Negative.    Endocrine: Negative.    Genitourinary: Negative.    Musculoskeletal: Negative.    Skin: Negative.    Allergic/Immunologic: Negative.    Neurological: Negative.    Hematological: Negative.    Psychiatric/Behavioral:  Positive for sleep disturbance.          Objective:   Ht 6' 2.4" (1.89 m)   Wt (!) 181.4 kg (399 lb 14.6 oz)   BMI 50.80 kg/m²     Physical Exam  Constitutional:       General: He is not in acute distress.     Appearance: He is well-developed.   HENT:      Head: Normocephalic and atraumatic.      Jaw: No trismus.      Right Ear: Hearing, tympanic membrane, ear canal and external ear normal. There is impacted cerumen (suctioned).      Left Ear: Hearing, tympanic membrane, ear canal and external ear normal. There is impacted cerumen (suctioned).      Ears:      Comments: Moist ear canals bilaterally     Nose: Nose normal. No nasal deformity, septal deviation, mucosal edema or rhinorrhea.      Mouth/Throat:      Dentition: Normal dentition.      Pharynx: Uvula midline. No oropharyngeal exudate or uvula swelling.   Eyes:      General: No scleral icterus.     Conjunctiva/sclera: Conjunctivae normal.      Right eye: Right conjunctiva is not injected. No chemosis.     Left eye: Left conjunctiva is not injected. No chemosis.     Pupils: Pupils are equal, round, and reactive to light.   Neck:      Thyroid: No thyroid " mass or thyromegaly.      Trachea: Trachea and phonation normal. No tracheal tenderness or tracheal deviation.   Pulmonary:      Effort: Pulmonary effort is normal. No accessory muscle usage or respiratory distress.      Breath sounds: No stridor.   Lymphadenopathy:      Head:      Right side of head: No submental, submandibular, preauricular or posterior auricular adenopathy.      Left side of head: No submental, submandibular, preauricular or posterior auricular adenopathy.      Cervical: No cervical adenopathy.      Right cervical: No superficial or deep cervical adenopathy.     Left cervical: No superficial or deep cervical adenopathy.   Skin:     General: Skin is warm and dry.      Findings: No erythema or rash.   Neurological:      Mental Status: He is alert and oriented to person, place, and time.      Cranial Nerves: No cranial nerve deficit.   Psychiatric:         Behavior: Behavior normal.         Thought Content: Thought content normal.          Procedure Note    CHIEF COMPLAINT:  Cerumen Impaction    Description:  The patient was seated in an exam chair.  An ear speculum was placed in the right EAC and was examined under the microscope.  Suction and/or loop curettes were used to remove a large cerumen impaction.  The tympanic membrane was visualized and was normal in appearance.  The procedure was repeated on the left side in a similar fashion.  The TM was intact and normal on this side as well.  The patient tolerated the procedure well.     Assessment:     1. Bilateral impacted cerumen          Plan:     Bilateral impacted cerumen         Cerumen impaction:  Removed today without difficulty.  I would recommend the use of a wax softening drop, either over the counter Debrox or mineral oil, on a weekly basis.  I also instructed the patient to avoid Qtips. Would recommend routine clean ing every 6 months.

## 2024-12-20 ENCOUNTER — OFFICE VISIT (OUTPATIENT)
Dept: PSYCHIATRY | Facility: CLINIC | Age: 24
End: 2024-12-20
Payer: OTHER GOVERNMENT

## 2024-12-20 DIAGNOSIS — F90.9 ATTENTION DEFICIT DISORDER WITH HYPERACTIVITY: ICD-10-CM

## 2024-12-20 DIAGNOSIS — F50.819 BINGE EATING DISORDER, UNSPECIFIED SEVERITY: ICD-10-CM

## 2024-12-20 DIAGNOSIS — F41.9 ANXIETY DISORDER, UNSPECIFIED TYPE: ICD-10-CM

## 2024-12-20 DIAGNOSIS — F31.9 BIPOLAR AFFECTIVE DISORDER, REMISSION STATUS UNSPECIFIED: Primary | ICD-10-CM

## 2024-12-20 RX ORDER — QUETIAPINE FUMARATE 50 MG/1
50 TABLET, FILM COATED ORAL NIGHTLY
Qty: 30 TABLET | Refills: 1 | Status: SHIPPED | OUTPATIENT
Start: 2024-12-20

## 2024-12-20 RX ORDER — DEXTROAMPHETAMINE SACCHARATE, AMPHETAMINE ASPARTATE, DEXTROAMPHETAMINE SULFATE AND AMPHETAMINE SULFATE 7.5; 7.5; 7.5; 7.5 MG/1; MG/1; MG/1; MG/1
30 TABLET ORAL 2 TIMES DAILY
Qty: 60 TABLET | Refills: 0 | Status: SHIPPED | OUTPATIENT
Start: 2024-12-20

## 2024-12-20 RX ORDER — ZIPRASIDONE HYDROCHLORIDE 80 MG/1
80 CAPSULE ORAL DAILY
Qty: 90 CAPSULE | Refills: 0 | Status: SHIPPED | OUTPATIENT
Start: 2024-12-20

## 2024-12-20 NOTE — PROGRESS NOTES
The patient location is: Patient's home . Patient reported  that his/her location at the time of this visit was in the [facility].    Visit type: Virtual visit with synchronous audio and video     Each patient to whom he or she provides medical services by telemedicine is: (1) informed of the relationship between the physician and patient and the respective role of any other health care provider with respect to management of the patient; and (2) notified that he or she may decline to receive medical services by telemedicine and may withdraw from such care at any time.    Patient was informed that I am a physician who is licensed in the [facility]:  Silver Beltran MD:  Employed by [facility] [employer]     Patient was instructed that If technology issues arise, he/she may  call our office phone at: [phone].    Pt informed that if he/she is ever in crisis (or has acute concerns), dial 911 or go to nearest Emergency Room (ER).    Pt informed that if questions related to privacy practices arise, contact [facility] Department: [phone].    Understanding Expressed. No questions.        Herminio Turner   2000 12/20/2024        CURRENT PRESENTATION  (psychiatric biopsychosocial evaluation; plan for treatment):   Last visit 09/1924 documentation includes:  ...the patient presents for the 1st time unaccompanied by a parent.  He says that his mother decided to listen to a book tape in their vehicle.  He has no complaints, concerns, or questions apart from indicating that the current Adderall plan is much more effective than previously but still insufficiently effective.  He indicates that he accidentally took 3 pills in 1 day, taking the last tablet from the previous prescription, getting the refill, forgetting that he had taken the 1st tablet of the day so then taking 2, and thereafter noting improved focus, a calmer feeling, and no side effects.       The patient reports that  "he has lost 20 lb, though not yet on any medications for weight loss.  He reports efforts to be careful about his eating prior to discontinuing Prozac, but then this became easier after discontinuing Prozac and still easier with the most recent dose of Adderall, though he continues to have an appetite and is eating sufficiently (and still overeating at times).        Euthymic with no depression, excessively elevated moods, irritable moods, or manic signs or symptoms.  Anxiety is controlled, but he is not challenging the issue with activities away from the home.  No psychosis.  No thoughts of harm to self or others or feelings of aggression.  Sleeping about 8 hours most nights, occasionally less if he stays up past his usual bedtime of midnight, sleeping till about 8:00 a.m..      No side effects of Adderall or Geodon, including with specific questioning.  No subjective or objective dyskinetic movements.  AIMS 0.       Encounter Diagnoses   Name Primary?    Bipolar affective disorder, remission status unspecified Yes    Anxiety disorder, unspecified type      Attention deficit disorder with hyperactivity      Binge eating disorder     PLAN:   Follow up in 3 months.    Psychiatry Medication:  Continue Geodon 80 mg daily.  Continue Adderall 20 mg twice daily until the current prescription is completed, at which time the dose will be increased to 30 mg twice daily; the patient will send me a message refill as needed.     indicates 60 tablets of Adderall IR 30 mg filled on November 20 and October 15.    In the current session, the patient reports that he is doing well overall."  He indicates that he has had delay in falling asleep and then awakens frequently.  He confirms using CPAP.  He says that he is otherwise doing well.  Including with specific questioning - No depression, dora, hypomania, manic signs or symptoms, problematic anxiety, psychosis, thoughts of harm to self or others, feelings of aggression; " focusing well; binge eating much decreased, he indicates that he has started Wegovy.  Including with specific questioning, no side effects with Geodon or Adderall.  No subjective or objective dyskinetic movements.    Interim history:  Living situation/supports:  No change  Last visit-  He reports positive relationships with his parents in his now 18-year-old sister, as well as his grandparents and aunt.  The family dog is doing well.  Previously-  Grandmother and dog are doing well.  His father's current sales job is for [employer].  ...He sees his grandparents at least once a week and his aunt next door almost daily.   Medical issues:  Bariatric follow-up-  11/21/24: wt 411 lb. was finally approved for Wegovy. Patient is completing Wegovy 0.25 mg dose this week. Patient is tolerating medication well without any side effects. Patient is continuing with diet changes. Patient will proceed with increased Wegovy 0.5 mg followed by 1 mg dosing per week. Will continue to monitor and adjust treatment plan accordingly.   Hepatology follow-up on 09/1924-  Metabolic dysfunction-associated steatotic liver disease (MASLD)       Herminio Turner is a 24 y.o. male withFatty Liver     Metabolic dysfunction-associated steatotic liver disease (MASLD)-concerned patient at high risk for MASH  -Recommend trying to stage with ELF, if not possible then will consider liver bx  -He may benefit from Rezdiffra based on staging  -Continue to work with BTR leandereal to lose weight  -     Comprehensive Metabolic Panel; Future; Expected date: 09/19/2024  -     CBC Auto Differential; Future; Expected date: 09/19/2024  -     Misc Sendout Test, Blood Enhanced liver fibrosis test; Future; Expected date: 09/19/2024     RTC in 6 months with preclinic labs  Nonpsychotropic Medication:  Includes, per Epic, iron, Mounjaro   Allergies:         Review of patient's allergies indicates:   Allergen Reactions    Aripiprazole Other (See Comments)       Some  impulsive buying (Xbox: Dec 2022) and some wt gain      Alcohol use:  None in the interim  Other substance use:  None    Mental Status Exam:   Appearance:  Appropriately groomed  Orientation:  X4  Attitude:  Cooperative, engaged   Eye Contact:  Appropriate  Behavior:  Calm, appropriate  Speech:     Rate - WNL    Volume - WNL    Quantity - WNL    Tone - relaxed, appropriate  Pressure - no  Thought Processes:  Goal-directed  Mood:  Euthymic   Affect:  Without distress, euthymic, including ability to brighten at appropriate times  SI:  No, and no thoughts of self-harm  HI:  No, and no thoughts of harm towards others  Paranoia:  No  Delusions:  No  Hallucinations:  No  Attention:  Intact over the course of the session  Cognition:  No deficits noted over the course of the session  Insight:  Intact   Judgment:  Intact  Impulse Control:  Intact       ASSESSMENT:   Encounter Diagnoses   Name Primary?    Bipolar affective disorder, remission status unspecified Yes    Attention deficit disorder with hyperactivity     Anxiety disorder, unspecified type     Binge eating disorder, unspecified severity      PLAN:   Follow up in 2 months.    Psychiatry Medication:  Continue Geodon 80 mg daily IR 30 morning.  Seroquel 50 mg at bedtime.  The patient risk/benefit discussion rationale risks, side effects and requests the medication.  Reviewed: EPS, akathisia, NMS, tardive dyskinesia, metabolic issues, decreased alertness, dizziness and unsteadiness, effects on driving and other activities requiring alertness and steadiness, orthostasis and arrhythmias and other cardiovascular issues, mood changes, confusion, suicidal ideations, seizures, lowering of blood counts, elevated liver enzymes, signs and symptoms associated with increased prolactin, and others.      Reviewed with patient:  Report side effects, other problems, or questions to the psychiatrist by way of the [facility] portal, [facility], or by calling [facility] at  [phone].  Messages are checked during clinic hours only.  For urgent issues outside of clinic hours, call 911 or go to an emergency department.  Follow up with primary care/MD specialist for continued monitoring of general health and wellness and any medical conditions.  Call [facility] at [phone] or use the [facility] portal if necessary for scheduling or rescheduling.  It is the responsibility of the patient to reschedule an appointment if an appointment has been canceled or missed.  Understanding was expressed; and no further concerns or questions were raised at this time.     [address]  Prescription drug management and 2 or more stable chronic illnesses (moderate)        Large portions of this note were completed by way of voice recognition dictation software, and transcription errors are possible, such that specific information in the note should be considered in the context of the entire report.

## 2024-12-23 ENCOUNTER — PATIENT MESSAGE (OUTPATIENT)
Dept: PSYCHIATRY | Facility: CLINIC | Age: 24
End: 2024-12-23
Payer: OTHER GOVERNMENT

## 2024-12-23 DIAGNOSIS — F90.9 ATTENTION DEFICIT DISORDER WITH HYPERACTIVITY: Primary | ICD-10-CM

## 2024-12-23 DIAGNOSIS — F31.9 BIPOLAR AFFECTIVE DISORDER, REMISSION STATUS UNSPECIFIED: ICD-10-CM

## 2024-12-24 RX ORDER — DEXTROAMPHETAMINE SACCHARATE, AMPHETAMINE ASPARTATE, DEXTROAMPHETAMINE SULFATE AND AMPHETAMINE SULFATE 7.5; 7.5; 7.5; 7.5 MG/1; MG/1; MG/1; MG/1
30 TABLET ORAL 2 TIMES DAILY
Qty: 60 TABLET | Refills: 0 | Status: SHIPPED | OUTPATIENT
Start: 2024-12-24

## 2024-12-24 NOTE — TELEPHONE ENCOUNTER
The patient sent a message requesting that the prescription for Adderall recently ordered be changed from [facility] at [address], [place] [address]

## 2024-12-26 RX ORDER — QUETIAPINE FUMARATE 50 MG/1
50 TABLET, FILM COATED ORAL NIGHTLY
Qty: 30 TABLET | Refills: 1 | Status: SHIPPED | OUTPATIENT
Start: 2024-12-26

## 2025-02-14 ENCOUNTER — PATIENT MESSAGE (OUTPATIENT)
Dept: INTERNAL MEDICINE | Facility: CLINIC | Age: 25
End: 2025-02-14
Payer: MEDICAID

## 2025-02-27 ENCOUNTER — OFFICE VISIT (OUTPATIENT)
Dept: PSYCHIATRY | Facility: CLINIC | Age: 25
End: 2025-02-27
Payer: MEDICAID

## 2025-02-27 VITALS — DIASTOLIC BLOOD PRESSURE: 77 MMHG | SYSTOLIC BLOOD PRESSURE: 116 MMHG | HEART RATE: 112 BPM

## 2025-02-27 DIAGNOSIS — F31.9 BIPOLAR AFFECTIVE DISORDER, REMISSION STATUS UNSPECIFIED: Primary | ICD-10-CM

## 2025-02-27 DIAGNOSIS — F90.9 ATTENTION DEFICIT DISORDER WITH HYPERACTIVITY: ICD-10-CM

## 2025-02-27 DIAGNOSIS — F50.819 BINGE EATING DISORDER, UNSPECIFIED SEVERITY: ICD-10-CM

## 2025-02-27 DIAGNOSIS — F41.9 ANXIETY DISORDER, UNSPECIFIED TYPE: ICD-10-CM

## 2025-02-27 PROCEDURE — 3074F SYST BP LT 130 MM HG: CPT | Mod: AF,HB,CPTII, | Performed by: PSYCHIATRY & NEUROLOGY

## 2025-02-27 PROCEDURE — 99212 OFFICE O/P EST SF 10 MIN: CPT | Mod: PBBFAC | Performed by: PSYCHIATRY & NEUROLOGY

## 2025-02-27 PROCEDURE — 1159F MED LIST DOCD IN RCRD: CPT | Mod: AF,HB,CPTII, | Performed by: PSYCHIATRY & NEUROLOGY

## 2025-02-27 PROCEDURE — 99999 PR PBB SHADOW E&M-EST. PATIENT-LVL II: CPT | Mod: PBBFAC,AF,HB, | Performed by: PSYCHIATRY & NEUROLOGY

## 2025-02-27 PROCEDURE — 1160F RVW MEDS BY RX/DR IN RCRD: CPT | Mod: AF,HB,CPTII, | Performed by: PSYCHIATRY & NEUROLOGY

## 2025-02-27 PROCEDURE — 3078F DIAST BP <80 MM HG: CPT | Mod: AF,HB,CPTII, | Performed by: PSYCHIATRY & NEUROLOGY

## 2025-02-27 PROCEDURE — G2211 COMPLEX E/M VISIT ADD ON: HCPCS | Mod: S$PBB,AF,HB, | Performed by: PSYCHIATRY & NEUROLOGY

## 2025-02-27 PROCEDURE — 99215 OFFICE O/P EST HI 40 MIN: CPT | Mod: S$PBB,AF,HB, | Performed by: PSYCHIATRY & NEUROLOGY

## 2025-02-27 RX ORDER — DEXTROAMPHETAMINE SACCHARATE, AMPHETAMINE ASPARTATE, DEXTROAMPHETAMINE SULFATE AND AMPHETAMINE SULFATE 7.5; 7.5; 7.5; 7.5 MG/1; MG/1; MG/1; MG/1
1 TABLET ORAL 2 TIMES DAILY
Qty: 60 TABLET | Refills: 0 | Status: SHIPPED | OUTPATIENT
Start: 2025-02-27

## 2025-02-27 RX ORDER — DEXTROAMPHETAMINE SACCHARATE, AMPHETAMINE ASPARTATE, DEXTROAMPHETAMINE SULFATE AND AMPHETAMINE SULFATE 7.5; 7.5; 7.5; 7.5 MG/1; MG/1; MG/1; MG/1
30 TABLET ORAL 2 TIMES DAILY
Qty: 60 TABLET | Refills: 0 | Status: SHIPPED | OUTPATIENT
Start: 2025-02-27

## 2025-02-27 RX ORDER — QUETIAPINE FUMARATE 100 MG/1
100 TABLET, FILM COATED ORAL NIGHTLY
Qty: 90 TABLET | Refills: 0 | Status: SHIPPED | OUTPATIENT
Start: 2025-02-27

## 2025-02-27 RX ORDER — ZIPRASIDONE HYDROCHLORIDE 80 MG/1
80 CAPSULE ORAL DAILY
Qty: 90 CAPSULE | Refills: 1 | Status: SHIPPED | OUTPATIENT
Start: 2025-02-27

## 2025-02-27 NOTE — PROGRESS NOTES
"    Herminio Turner   2000 02/27/2025        CURRENT PRESENTATION  (psychiatric biopsychosocial evaluation; plan for treatment):   Last visit 12/20/2024 documentation includes:   ...the patient reports that he is doing well overall."  He indicates that he has had delay in falling asleep and then awakens frequently.  He confirms using CPAP.  He says that he is otherwise doing well.  Including with specific questioning - No depression, dora, hypomania, manic signs or symptoms, problematic anxiety, psychosis, thoughts of harm to self or others, feelings of aggression; focusing well; binge eating much decreased, he indicates that he has started Wegovy.   Encounter Diagnoses   Name Primary?    Bipolar affective disorder, remission status unspecified Yes    Attention deficit disorder with hyperactivity      Anxiety disorder, unspecified type      Binge eating disorder, unspecified severity     PLAN:   Follow up in 2 months.    Psychiatry Medication:  Continue Geodon 80 mg daily and Adderall IR 30 morning and afternoon.  Seroquel 50 mg at bedtime.     indicates 60 tablets of Adderall IR 30 mg on October 15, November 20, and December 24; an additional 60 tablets were also filled on December 26, with no refills thereafter (no refills in  for January or thus far in February).    In the current session, the patient reports that he has done well overall and considers himself doing good, though he indicates that there were 2 periods of time up to 1 week in length of somewhat elevated mood with increased energy, mildly increased activity, increased speech, and mildly racing thoughts, with intact sleep, no excessive spending, no behaviors of risk, no grandiosity, and no decrease in function; he describes mild hypomania during those times.  No times of depression, irritability, decreased function, suicidal ideation, thoughts of self-harm, homicidal ideation, thoughts of harm towards others, or feelings of " aggression.  Anxiety has been controlled, but he continues largely not to challenge himself.  He indicates that with Adderall and Wegovy, binge eating is fully under control, and he says that he has lost 10 lb since starting Wegovy.    Sleep is consistently 7 hours or more, but he says that his sleep is his more common 9 hours if he is able to fall asleep at 10:00 p.m., awakening at around 7:00 a.m.; he indicates that he prefers sleeping during these hours rather than the other times of 2:00 a.m.until around 9:00 a.m..  He says that he takes Seroquel 50 mg about 6:00 a.m. every evening along with melatonin and says that about half the time he is able to fall asleep at 10:00 p.m. and about half the time he is up until 2:00 a.m..  Questioning yields no pattern related to the timing of his 2nd Adderall dose, and he indicates that he is careful to take the 2nd dose of Adderall in the 1st half of the afternoon, not taking the 2nd dose if he has taken the 1st dose after mid morning.  He says that he no longer has frequent awakening.  He confirms that he consistently uses CPAP.  With long discussion of rationale, risks, and side effects, requests an increase in Seroquel to 200 mg.    Interim history:  Living situation/supports:  No change  Previously-  He reports positive relationships with his parents and is 18-year-old sister (graduating from high school this year), as well as his grandparents and aunt.  The family dog is doing well.  ...His father's current sales job is for [employer]  ...He sees his grandparents at least once a week and his aunt next door almost daily.  Medical issues:  Bariatrics visit includes-  02/21/25: wt 403 lb. - 8 lbs. Currently on Wegovy 1mg/wk. Tolerating well with no significant side effects. Very occasional nausea but tolerable. Denies constipation or abdominal pain. Increase to Wegovy 1.7mg/wk. Will continue with dietary changes. Can consider Zepbound if not continued appropriate weight  loss. Continue to monitor.   Nonpsychotropic Medication:  Includes, per Epic and care everywhere, Wegovy   Allergies:   Review of patient's allergies indicates:   Allergen Reactions    Aripiprazole Other (See Comments)     Some impulsive buying (Xbox: Dec 2022) and some wt gain     Alcohol use:  Single beer on occasion  Other substance use:  None    Mental Status Exam:   Appearance:  Appropriately groomed  Orientation:  X4  Attitude:  Cooperative, engaged   Eye Contact:  Appropriate  Behavior:  Calm, appropriate  Speech:     Rate - WNL    Volume - WNL    Quantity - WNL    Tone - relaxed, appropriate  Pressure - no  Thought Processes:  Goal-directed  Mood:  Euthymic currently; a couple of interim brief episodes of described hypomania   Affect:  Without distress, euthymic, including ability to brighten at appropriate times  SI:  No, and no thoughts of self-harm  HI:  No, and no thoughts of harm towards others  Paranoia:  No  Delusions:  No  Hallucinations:  No  Attention:  Intact over the course of the session  Cognition:  No deficits noted over the course of the session  Insight:  Intact   Judgment:  Intact  Impulse Control:  Intact       ASSESSMENT:   Encounter Diagnoses   Name Primary?    Bipolar affective disorder, remission status unspecified Yes    Anxiety disorder, unspecified type     Attention deficit disorder with hyperactivity     Binge eating disorder, unspecified severity      PLAN:   Follow up in 3 months.    Psychiatry Medication:  Increase Seroquel to 100 mg every evening.  Continue Geodon 80 mg daily and Adderall IR 30 mg in the morning and early afternoon.    Reviewed with patient:  Report side effects, other problems, or questions to the psychiatrist by way of the [facility] portal, [facility], or by calling [facility] at [phone].  Messages are checked during clinic hours only.  For urgent issues outside of clinic hours, call 911 or go to an emergency department.  Follow up with  primary care/MD specialist for continued monitoring of general health and wellness and any medical conditions.  Call [facility] at [phone] or use the [facility] portal if necessary for scheduling or rescheduling.  It is the responsibility of the patient to reschedule an appointment if an appointment has been canceled or missed.  Understanding was expressed; and no further concerns or questions were raised at this time.     [id number]     Total time for the patient encounter:    43 minutes.       Face-to-face time (performing a medically appropriate evaluation; education/counseling with the patient and/or accompanying person; ordering medications, labs, or referrals during the visit):   29 minutes.      Time spent in non face-to-face time was as follows:  8 minutes pre-charting and reviewing LABP , portions of previous behavioral health encounters in the record, and portions of the interim [facility] information in the available record  0 minutes placing orders outside of face-to-face time  6 minutes completing documentation of clinical information in the health record, outside of face-to-face time        Large portions of this note were completed by way of voice recognition dictation software, and transcription errors are possible, such that specific information in the note should be considered in the context of the entire report.

## 2025-03-10 NOTE — PROGRESS NOTES
Important Medication Changes:none    Further testing to be done:none    Next follow up visit: In office in 1 Year   Telephone remote check with home monitor in 3 Months, 6, 9 months      HERE IS SOME IMPORTANT INFORMATION FOR OUR PATIENTS    If you need refills- call your pharmacist and they will contact our office.    If you have medical concerns call    [phone] ([facility])                                                           [phone]  ([place])                                                           [phone]  ([place])                                                           [phone]  ([place])                 If you have a question during office hours, or need to make an appointment call [phone]. You will eventually speak with my nurses. If you need to speak to me directly, let them know and I will get back to you as soon as possible.  Better yet, you can consider signing up for [employer], our popular online communication portal to schedule an appointment, ask for a refill, see your results, or message our staff. Go to: [email or web address]      Duane Arthur MD    Subjective     Patient ID: Herminio Turner is a 23 y.o. male.  Pt was not able to send food log.     Chief Complaint: Follow-up (Follow up)  Pt here for f/u. He has had more snacks at night. He denies any BED. Tends to be lean cuisines or pizza pockets.     He is tolerating metformin once daily> has some metformin and is okay with increasing to bid.     Taking ADD med/stimulant. Taking geodon as well as anxiety med (prozac 20 mg).     Hx of fatty liver; over bmi limit for fibroscan. Has not seen hepatology. LFTs 4/23: ast/alt: 62/104  Hep panel NR.     HPI  Review of Systems   Constitutional:  Negative for activity change, appetite change, fatigue and unexpected weight change.   HENT:  Negative for mouth dryness.    Eyes:  Negative for visual disturbance.   Respiratory:  Negative for apnea, chest tightness and shortness of breath.    Cardiovascular:  Negative for chest pain.   Gastrointestinal:  Negative for abdominal pain, blood in stool, nausea, rectal pain and vomiting.   Musculoskeletal:  Negative for arthralgias and myalgias.   Integumentary:  Negative for rash.   Allergic/Immunologic: Negative for food allergies.   Neurological:  Negative for dizziness, tremors, syncope, weakness and light-headedness.   Hematological:  Negative for adenopathy.   Psychiatric/Behavioral:  Negative for behavioral problems and sleep disturbance. The patient is not nervous/anxious.           Objective     Vitals:    10/12/23 1418   BP: 120/80   Pulse: 100   Resp: 18   Temp: 98.6 °F (37 °C)     Wt Readings from Last 10 Encounters:   10/12/23 (!) 166.2 kg (366 lb 6.5 oz)   08/16/23 (!) 162 kg (357 lb 2.3 oz)   07/07/23 (!) 165 kg (363 lb 12.1 oz)   06/05/23 (!) 161.6 kg (356 lb 4.2 oz)   03/01/23 (!) 164.9 kg (363 lb 8.6 oz)   01/19/23 (!) 158.3 kg (349 lb)   12/09/22 (!) 152.4 kg (335 lb 15.7 oz)   12/07/22 (!) 150.1 kg (331 lb)   10/05/22 (!) 149.6 kg (329 lb 11.2 oz)   07/28/22 (!) 146.3 kg (322 lb 8 oz)       Physical  Exam  Vitals and nursing note reviewed.   Constitutional:       General: He is not in acute distress.  HENT:      Head: Normocephalic and atraumatic.      Mouth/Throat:      Pharynx: Oropharynx is clear.   Eyes:      General: No scleral icterus.     Pupils: Pupils are equal, round, and reactive to light.   Neck:      Comments: No TM  Cardiovascular:      Rate and Rhythm: Normal rate and regular rhythm.      Pulses: Normal pulses.      Heart sounds: Normal heart sounds. No murmur heard.     No friction rub. No gallop.   Pulmonary:      Effort: Pulmonary effort is normal. No respiratory distress.      Breath sounds: Normal breath sounds. No wheezing, rhonchi or rales.   Abdominal:      General: Bowel sounds are normal. There is no distension.      Palpations: Abdomen is soft.      Tenderness: There is no abdominal tenderness.   Musculoskeletal:         General: No swelling.      Cervical back: Normal range of motion and neck supple. No tenderness.   Lymphadenopathy:      Cervical: No cervical adenopathy.   Skin:     General: Skin is warm.      Capillary Refill: Capillary refill takes less than 2 seconds.      Findings: No erythema or rash.   Neurological:      Mental Status: He is alert and oriented to person, place, and time.   Psychiatric:         Mood and Affect: Mood normal.         Behavior: Behavior normal.         PAST MEDICAL HISTORY:  Past Medical History:   Diagnosis Date    ADD (attention deficit disorder with hyperactivity) 6/18/2013    Anxiety     Depression          PAST SURGICAL HISTORY:  Past Surgical History:   Procedure Laterality Date    ADENOIDECTOMY      CIRCUMCISION      TONSILLECTOMY      TYMPANOSTOMY TUBE PLACEMENT         FAMILY HISTORY:  Family History   Adopted: Yes   Problem Relation Age of Onset    Crohn's disease Mother     No Known Problems Father           SOCIAL HISTORY:  Social History     Social History Narrative    Not on file       MEDICATIONS:  Medications have been  reviewed.    ALLERGIES:  Allergies have been reviewed.       Assessment and Plan           ICD-10-CM ICD-9-CM   1. NAFLD (nonalcoholic fatty liver disease)  K76.0 571.8   2. Class 3 severe obesity due to excess calories with serious comorbidity and body mass index (BMI) of 45.0 to 49.9 in adult  E66.01 278.01    Z68.42 V85.42   3. Bipolar 1 disorder, mixed, moderate  F31.62 296.62   4. Autism disorder ( formerly Asperger)  F84.0 299.00   5. Anxiety disorder, unspecified type  F41.9 300.00    NAFLD (nonalcoholic fatty liver disease)  Comments:  cannot do fibroscan, will refer  neg hep panel; bump in enzymes  avoid hepatotoxic agents; reviewed with pt  Orders:  -     Ambulatory referral/consult to Hepatology; Future; Expected date: 10/19/2023  -     Ambulatory Referral/Consult to Nutrition Services - [facility]; Future; Expected date: 10/19/2023  Could benefit from wegovy if insurance can approve    Class 3 severe obesity due to excess calories with serious comorbidity and body mass index (BMI) of 45.0 to 49.9 in adult  Comments:  pt walking 7-8 miles/week  protein goals/priority  avoid sugars  goal 10% tbw  Orders:  -     Ambulatory referral/consult to Hepatology; Future; Expected date: 10/19/2023  -     Ambulatory Referral/Consult to Nutrition Services - [facility]; Future; Expected date: 10/19/2023    Bipolar 1 disorder, mixed, moderate  Autism disorder ( formerly Asperger)  Anxiety disorder, unspecified type  Chronic/stable         Will leave info for Dr Beltran below:    -Pt has CI to phentermine/lomaira  -Stable with anxiety on your prozac    -Have d/w pt would not want to consider topamax or addition of wellbutrin without d/w Dr Beltran  If he feels is CI then can do PA for  to get Wegovy (currently back order).   Brief review with pt risks/benefits/se's of these medications.

## 2025-03-15 DIAGNOSIS — D50.9 IRON DEFICIENCY ANEMIA, UNSPECIFIED IRON DEFICIENCY ANEMIA TYPE: ICD-10-CM

## 2025-03-17 ENCOUNTER — RESULTS FOLLOW-UP (OUTPATIENT)
Dept: HEPATOLOGY | Facility: HOSPITAL | Age: 25
End: 2025-03-17

## 2025-03-17 ENCOUNTER — LAB VISIT (OUTPATIENT)
Dept: LAB | Facility: HOSPITAL | Age: 25
End: 2025-03-17
Attending: INTERNAL MEDICINE
Payer: OTHER GOVERNMENT

## 2025-03-17 ENCOUNTER — OFFICE VISIT (OUTPATIENT)
Dept: OTOLARYNGOLOGY | Facility: CLINIC | Age: 25
End: 2025-03-17
Payer: OTHER GOVERNMENT

## 2025-03-17 VITALS — BODY MASS INDEX: 40.43 KG/M2 | WEIGHT: 315 LBS | HEIGHT: 74 IN

## 2025-03-17 DIAGNOSIS — K76.0 METABOLIC DYSFUNCTION-ASSOCIATED STEATOTIC LIVER DISEASE (MASLD): ICD-10-CM

## 2025-03-17 DIAGNOSIS — H92.11 OTORRHEA OF RIGHT EAR: Primary | ICD-10-CM

## 2025-03-17 LAB
ALBUMIN SERPL BCP-MCNC: 3.8 G/DL (ref 3.5–5.2)
ALP SERPL-CCNC: 94 U/L (ref 40–150)
ALT SERPL W/O P-5'-P-CCNC: 110 U/L (ref 10–44)
ANION GAP SERPL CALC-SCNC: 11 MMOL/L (ref 8–16)
AST SERPL-CCNC: 52 U/L (ref 10–40)
BASOPHILS # BLD AUTO: 0.02 K/UL (ref 0–0.2)
BASOPHILS NFR BLD: 0.2 % (ref 0–1.9)
BILIRUB SERPL-MCNC: 0.3 MG/DL (ref 0.1–1)
BUN SERPL-MCNC: 7 MG/DL (ref 6–20)
CALCIUM SERPL-MCNC: 9.8 MG/DL (ref 8.7–10.5)
CHLORIDE SERPL-SCNC: 107 MMOL/L (ref 95–110)
CO2 SERPL-SCNC: 23 MMOL/L (ref 23–29)
CREAT SERPL-MCNC: 0.8 MG/DL (ref 0.5–1.4)
DIFFERENTIAL METHOD BLD: ABNORMAL
EOSINOPHIL # BLD AUTO: 0.1 K/UL (ref 0–0.5)
EOSINOPHIL NFR BLD: 1 % (ref 0–8)
ERYTHROCYTE [DISTWIDTH] IN BLOOD BY AUTOMATED COUNT: 12.6 % (ref 11.5–14.5)
EST. GFR  (NO RACE VARIABLE): >60 ML/MIN/1.73 M^2
GLUCOSE SERPL-MCNC: 100 MG/DL (ref 70–110)
HCT VFR BLD AUTO: 41.2 % (ref 40–54)
HGB BLD-MCNC: 13.5 G/DL (ref 14–18)
IMM GRANULOCYTES # BLD AUTO: 0.04 K/UL (ref 0–0.04)
IMM GRANULOCYTES NFR BLD AUTO: 0.5 % (ref 0–0.5)
LYMPHOCYTES # BLD AUTO: 2 K/UL (ref 1–4.8)
LYMPHOCYTES NFR BLD: 23.4 % (ref 18–48)
MCH RBC QN AUTO: 27.7 PG (ref 27–31)
MCHC RBC AUTO-ENTMCNC: 32.8 G/DL (ref 32–36)
MCV RBC AUTO: 85 FL (ref 82–98)
MONOCYTES # BLD AUTO: 0.5 K/UL (ref 0.3–1)
MONOCYTES NFR BLD: 6 % (ref 4–15)
NEUTROPHILS # BLD AUTO: 6 K/UL (ref 1.8–7.7)
NEUTROPHILS NFR BLD: 68.9 % (ref 38–73)
NRBC BLD-RTO: 0 /100 WBC
PLATELET # BLD AUTO: 315 K/UL (ref 150–450)
PMV BLD AUTO: 10 FL (ref 9.2–12.9)
POTASSIUM SERPL-SCNC: 4.2 MMOL/L (ref 3.5–5.1)
PROT SERPL-MCNC: 7 G/DL (ref 6–8.4)
RBC # BLD AUTO: 4.87 M/UL (ref 4.6–6.2)
SODIUM SERPL-SCNC: 141 MMOL/L (ref 136–145)
WBC # BLD AUTO: 8.69 K/UL (ref 3.9–12.7)

## 2025-03-17 PROCEDURE — 69210 REMOVE IMPACTED EAR WAX UNI: CPT | Mod: 50,PBBFAC | Performed by: PHYSICIAN ASSISTANT

## 2025-03-17 PROCEDURE — 80053 COMPREHEN METABOLIC PANEL: CPT | Performed by: INTERNAL MEDICINE

## 2025-03-17 PROCEDURE — 99213 OFFICE O/P EST LOW 20 MIN: CPT | Mod: PBBFAC | Performed by: PHYSICIAN ASSISTANT

## 2025-03-17 PROCEDURE — 87070 CULTURE OTHR SPECIMN AEROBIC: CPT

## 2025-03-17 PROCEDURE — 36415 COLL VENOUS BLD VENIPUNCTURE: CPT | Mod: PO | Performed by: INTERNAL MEDICINE

## 2025-03-17 PROCEDURE — 85025 COMPLETE CBC W/AUTO DIFF WBC: CPT | Performed by: INTERNAL MEDICINE

## 2025-03-17 PROCEDURE — 99999 PR PBB SHADOW E&M-EST. PATIENT-LVL III: CPT | Mod: PBBFAC,,, | Performed by: PHYSICIAN ASSISTANT

## 2025-03-17 RX ORDER — IRON,CARBONYL/ASCORBIC ACID 100-250 MG
1 TABLET ORAL DAILY
Qty: 90 TABLET | Refills: 3 | Status: SHIPPED | OUTPATIENT
Start: 2025-03-17

## 2025-03-17 RX ORDER — NEOMYCIN SULFATE, POLYMYXIN B SULFATE AND HYDROCORTISONE 10; 3.5; 1 MG/ML; MG/ML; [USP'U]/ML
3 SUSPENSION/ DROPS AURICULAR (OTIC) 3 TIMES DAILY
Qty: 10 ML | Refills: 0 | Status: SHIPPED | OUTPATIENT
Start: 2025-03-17

## 2025-03-17 NOTE — PROGRESS NOTES
"Subjective:   Patient ID: Herminio Turner is a 24 y.o. male.    Chief Complaint: Cerumen Impaction (Pt is coming in today for bilateral ear cleaning)    Mr. Turner is a very pleasant 25 yo male here to see me today for cleaning.  He denies pain or drainage.  States right ear has been a little more full recently.    Follow-up      Review of patient's allergies indicates:   Allergen Reactions    Aripiprazole Other (See Comments)     Some impulsive buying (Xbox: Dec 2022) and some wt gain           Review of Systems   Constitutional: Negative.    HENT:  Positive for hearing loss.    Eyes: Negative.    Respiratory: Negative.     Cardiovascular: Negative.    Gastrointestinal: Negative.    Endocrine: Negative.    Genitourinary: Negative.    Musculoskeletal: Negative.    Skin: Negative.    Allergic/Immunologic: Negative.    Neurological: Negative.    Hematological: Negative.    Psychiatric/Behavioral:  Positive for decreased concentration and sleep disturbance.          Objective:   Ht 6' 2.4" (1.89 m)   Wt (!) 181.4 kg (399 lb 14.6 oz)   BMI 50.80 kg/m²     Physical Exam  Constitutional:       General: He is not in acute distress.     Appearance: He is well-developed.   HENT:      Head: Normocephalic and atraumatic.      Jaw: No trismus.      Right Ear: Hearing, tympanic membrane, ear canal and external ear normal. Drainage present. Right ear laceration: culture taken today.There is impacted cerumen (suctioned).      Left Ear: Hearing, tympanic membrane, ear canal and external ear normal. There is impacted cerumen (suctioned).      Ears:      Comments: Moist ear canals bilaterally     Nose: Nose normal. No nasal deformity, septal deviation, mucosal edema or rhinorrhea.      Mouth/Throat:      Dentition: Normal dentition.      Pharynx: Uvula midline. No oropharyngeal exudate or uvula swelling.   Eyes:      General: No scleral icterus.     Conjunctiva/sclera: Conjunctivae normal.      Right eye: Right conjunctiva is " not injected. No chemosis.     Left eye: Left conjunctiva is not injected. No chemosis.     Pupils: Pupils are equal, round, and reactive to light.   Neck:      Thyroid: No thyroid mass or thyromegaly.      Trachea: Trachea and phonation normal. No tracheal tenderness or tracheal deviation.   Pulmonary:      Effort: Pulmonary effort is normal. No accessory muscle usage or respiratory distress.      Breath sounds: No stridor.   Lymphadenopathy:      Head:      Right side of head: No submental, submandibular, preauricular or posterior auricular adenopathy.      Left side of head: No submental, submandibular, preauricular or posterior auricular adenopathy.      Cervical: No cervical adenopathy.      Right cervical: No superficial or deep cervical adenopathy.     Left cervical: No superficial or deep cervical adenopathy.   Skin:     General: Skin is warm and dry.      Findings: No erythema or rash.   Neurological:      Mental Status: He is alert and oriented to person, place, and time.      Cranial Nerves: No cranial nerve deficit.   Psychiatric:         Behavior: Behavior normal.         Thought Content: Thought content normal.          Procedure Note    CHIEF COMPLAINT:  Cerumen Impaction    Description:  The patient was seated in an exam chair.  An ear speculum was placed in the right EAC and was examined under the microscope.  Suction and/or loop curettes were used to remove a large cerumen impaction.  The tympanic membrane was visualized and was normal in appearance.  The procedure was repeated on the left side in a similar fashion.  The TM was intact and normal on this side as well.  The patient tolerated the procedure well.     Assessment:     1. Otorrhea of right ear          Plan:     Otorrhea of right ear  -     CULTURE, AEROBIC  (SPECIFY SOURCE)  -     neomycin-polymyxin-hydrocortisone (CORTISPORIN) 3.5-10,000-1 mg/mL-unit/mL-% otic suspension; Place 3 drops into the right ear 3 (three) times daily.   Dispense: 10 mL; Refill: 0         Cerumen impaction:  Removed today without difficulty.  I would recommend the use of a wax softening drop, either over the counter Debrox or mineral oil, on a weekly basis.  I also instructed the patient to avoid Qtips. Would recommend routine cleaning every 6 months.    The patient has drainage from the the right ear today.  I would recommend that we start the patient on Cortisporin ear drops.  A culture was obtained today in clinic, and will call in 3-4 days with culture results.  If ear is still draining at that point, will then adjust ear drop as needed and likely start on culture directed oral antibiotics.  We did discuss that sometimes the culture does not reveal any significant bacteria even with active drainage, and in that case will treat empirically.  Return to clinic in two weeks, sooner if needed.

## 2025-03-20 ENCOUNTER — RESULTS FOLLOW-UP (OUTPATIENT)
Dept: OTOLARYNGOLOGY | Facility: CLINIC | Age: 25
End: 2025-03-20

## 2025-03-20 LAB — BACTERIA SPEC AEROBE CULT: NORMAL

## 2025-03-23 ENCOUNTER — PATIENT MESSAGE (OUTPATIENT)
Dept: PSYCHIATRY | Facility: CLINIC | Age: 25
End: 2025-03-23
Payer: OTHER GOVERNMENT

## 2025-03-23 DIAGNOSIS — F41.9 ANXIETY DISORDER, UNSPECIFIED TYPE: Primary | ICD-10-CM

## 2025-03-24 ENCOUNTER — PATIENT MESSAGE (OUTPATIENT)
Dept: PSYCHIATRY | Facility: CLINIC | Age: 25
End: 2025-03-24
Payer: OTHER GOVERNMENT

## 2025-03-24 RX ORDER — LORAZEPAM 1 MG/1
1 TABLET ORAL NIGHTLY
Qty: 30 TABLET | Refills: 0 | Status: SHIPPED | OUTPATIENT
Start: 2025-03-24

## 2025-03-24 NOTE — TELEPHONE ENCOUNTER
I called the patient regarding his message.  He reports brief twitching in his calf muscles multiple times per week; bilateral, but not simultaneously; not associated with any particular activity or situation.  No pain or interference with functioning.  Asked about other muscle movements, he indicates longstanding uncommon occasional calf cramping during sleep and 1 instance of a twitching movement in 1 wrist.  No other complaints, and he reports that he is stably from a psychiatric standpoint.    The patient is aware that this could be a reflexion of a dyskinetic movement.  With long discussion tardive dyskinesia, the patient elects to discontinue the most recently started antipsychotic, Seroquel.  He reasonably expresses concern about his ability to sleep without the medication, as he notes poor sleep prior to Seroquel improvement but incomplete at 50 mg, and now sleeping well with 100 mg.  I also reviewed with him the risks of relapsing manic symptomatology, and he expressed understanding and volunteered his intent to monitor for this and contact me for any early signs or symptoms.    Long discussion of treatment options for sleep includes the risks with sedating antidepressant medication (dora, cardiovascular, weight gain), hydroxyzine or other antihistamines (QTC issues for the patient on Geodon), and benzodiazepines.  The patient elects the latter.  He also put his father on the phone for a three-way discussion, in his father is agreeable to benzodiazepines.  The patient will begin lorazepam 1 mg at bedtime.  An appointment will be scheduled in about 4 weeks.  Regarding benzodiazepines, the discussion included: Common side effects of benzodiazepines include drowsiness, impaired coordination, impaired attention, possible memory loss. Do NOT operate machinery untiil you are sure how the medication will affect you.  Significant danger exists in mixing this medication with alcohol or other sedating compounds. The  medication is a controlled substance and has the potential for abuse, addiction, and withdrawal, the latter of which can result in seizures, confusion, psychosis, other morbidities, and death. Safeguard the medication as no early refills for lost or stolen medications are routinely authorized.

## 2025-04-03 ENCOUNTER — OFFICE VISIT (OUTPATIENT)
Dept: INTERNAL MEDICINE | Facility: CLINIC | Age: 25
End: 2025-04-03
Payer: OTHER GOVERNMENT

## 2025-04-03 VITALS
HEIGHT: 74 IN | TEMPERATURE: 97 F | DIASTOLIC BLOOD PRESSURE: 82 MMHG | WEIGHT: 315 LBS | SYSTOLIC BLOOD PRESSURE: 112 MMHG | HEART RATE: 112 BPM | OXYGEN SATURATION: 96 % | BODY MASS INDEX: 40.43 KG/M2

## 2025-04-03 DIAGNOSIS — Z00.00 ROUTINE GENERAL MEDICAL EXAMINATION AT HEALTH CARE FACILITY: Primary | ICD-10-CM

## 2025-04-03 DIAGNOSIS — F31.62 BIPOLAR 1 DISORDER, MIXED, MODERATE: ICD-10-CM

## 2025-04-03 DIAGNOSIS — E66.01 CLASS 3 SEVERE OBESITY DUE TO EXCESS CALORIES WITH SERIOUS COMORBIDITY AND BODY MASS INDEX (BMI) OF 45.0 TO 49.9 IN ADULT: ICD-10-CM

## 2025-04-03 DIAGNOSIS — E66.813 CLASS 3 SEVERE OBESITY DUE TO EXCESS CALORIES WITH SERIOUS COMORBIDITY AND BODY MASS INDEX (BMI) OF 45.0 TO 49.9 IN ADULT: ICD-10-CM

## 2025-04-03 DIAGNOSIS — K76.0 NAFLD (NONALCOHOLIC FATTY LIVER DISEASE): ICD-10-CM

## 2025-04-03 DIAGNOSIS — F90.9 ATTENTION DEFICIT DISORDER WITH HYPERACTIVITY: ICD-10-CM

## 2025-04-03 PROCEDURE — 99214 OFFICE O/P EST MOD 30 MIN: CPT | Mod: PBBFAC,PO | Performed by: INTERNAL MEDICINE

## 2025-04-03 PROCEDURE — 99999 PR PBB SHADOW E&M-EST. PATIENT-LVL IV: CPT | Mod: PBBFAC,,, | Performed by: INTERNAL MEDICINE

## 2025-04-03 RX ORDER — MELATONIN 5 MG
CAPSULE ORAL
COMMUNITY

## 2025-04-03 RX ORDER — SEMAGLUTIDE 1.7 MG/.75ML
1.7 INJECTION, SOLUTION SUBCUTANEOUS
COMMUNITY
Start: 2025-02-21

## 2025-04-03 RX ORDER — METFORMIN HYDROCHLORIDE 500 MG/1
500 TABLET, EXTENDED RELEASE ORAL 2 TIMES DAILY
COMMUNITY

## 2025-04-03 NOTE — PROGRESS NOTES
"Subjective:       Patient ID: Herminio Turner is a 24 y.o. male.    Chief Complaint: Annual Exam      HPI:  History of Present Illness    Patient presents today for follow up    He is followed by bariatrics every 2 months. He has been taking Wegovy 1.7 mg for weight loss for approximately 3-6 months with mild, tolerable nausea as a side effect.    He is followed by Dr. Gary for liver condition. ALT and AST remain elevated but stable.    He continues Ativan, Geodon, Adderall, Melatonin, Metformin, Vitamin C, Iron, and Wegovy.         Review of Systems   Constitutional:  Negative for fever and unexpected weight change.   HENT:  Negative for hearing loss, postnasal drip and rhinorrhea.    Eyes:  Negative for pain and visual disturbance.   Respiratory:  Negative for cough, shortness of breath and wheezing.    Cardiovascular:  Negative for chest pain and palpitations.   Gastrointestinal:  Negative for constipation, diarrhea, nausea and vomiting.   Genitourinary:  Negative for dysuria and hematuria.   Musculoskeletal:  Negative for arthralgias, back pain, myalgias and neck stiffness.   Skin:  Negative for pallor and rash.   Neurological:  Negative for seizures, syncope and headaches.   Hematological:  Negative for adenopathy.   Psychiatric/Behavioral:  Negative for dysphoric mood. The patient is not nervous/anxious.        Objective:   /82   Pulse (!) 112   Temp 96.6 °F (35.9 °C)   Ht 6' 2" (1.88 m)   Wt (!) 180.4 kg (397 lb 11.4 oz)   SpO2 96%   BMI 51.06 kg/m²      Physical Exam  Vitals reviewed.   Constitutional:       General: He is not in acute distress.     Appearance: He is well-developed.   HENT:      Head: Normocephalic and atraumatic.      Right Ear: Tympanic membrane and ear canal normal.      Left Ear: Tympanic membrane and ear canal normal.   Eyes:      Pupils: Pupils are equal, round, and reactive to light.   Neck:      Thyroid: No thyromegaly.      Vascular: No JVD.   Cardiovascular:      " Rate and Rhythm: Normal rate and regular rhythm.      Heart sounds: Normal heart sounds. No murmur heard.     No friction rub. No gallop.   Pulmonary:      Effort: Pulmonary effort is normal.      Breath sounds: Normal breath sounds. No wheezing or rales.   Abdominal:      General: Bowel sounds are normal. There is no distension.      Palpations: Abdomen is soft.      Tenderness: There is no abdominal tenderness. There is no guarding or rebound.   Musculoskeletal:         General: Normal range of motion.      Cervical back: Normal range of motion and neck supple.   Lymphadenopathy:      Cervical: No cervical adenopathy.   Skin:     General: Skin is warm and dry.      Findings: No rash.   Neurological:      General: No focal deficit present.      Mental Status: He is alert and oriented to person, place, and time.      Cranial Nerves: No cranial nerve deficit.      Deep Tendon Reflexes: Reflexes are normal and symmetric.   Psychiatric:         Mood and Affect: Mood normal.         Judgment: Judgment normal.         No visits with results within 2 Week(s) from this visit.   Latest known visit with results is:   Lab Visit on 03/17/2025   Component Date Value    Sodium 03/17/2025 141     Potassium 03/17/2025 4.2     Chloride 03/17/2025 107     CO2 03/17/2025 23     Glucose 03/17/2025 100     BUN 03/17/2025 7     Creatinine 03/17/2025 0.8     Calcium 03/17/2025 9.8     Total Protein 03/17/2025 7.0     Albumin 03/17/2025 3.8     Total Bilirubin 03/17/2025 0.3     Alkaline Phosphatase 03/17/2025 94     AST 03/17/2025 52 (H)     ALT 03/17/2025 110 (H)     eGFR 03/17/2025 >60     Anion Gap 03/17/2025 11     WBC 03/17/2025 8.69     RBC 03/17/2025 4.87     Hemoglobin 03/17/2025 13.5 (L)     Hematocrit 03/17/2025 41.2     MCV 03/17/2025 85     MCH 03/17/2025 27.7     MCHC 03/17/2025 32.8     RDW 03/17/2025 12.6     Platelets 03/17/2025 315     MPV 03/17/2025 10.0     Immature Granulocytes 03/17/2025 0.5     Gran # (ANC)  03/17/2025 6.0     Immature Grans (Abs) 03/17/2025 0.04     Lymph # 03/17/2025 2.0     Mono # 03/17/2025 0.5     Eos # 03/17/2025 0.1     Baso # 03/17/2025 0.02     nRBC 03/17/2025 0     Gran % 03/17/2025 68.9     Lymph % 03/17/2025 23.4     Mono % 03/17/2025 6.0     Eosinophil % 03/17/2025 1.0     Basophil % 03/17/2025 0.2     Differential Method 03/17/2025 Automated        Assessment:       1. Routine general medical examination at health care facility    2. Bipolar 1 disorder, mixed, moderate    3. ADD (attention deficit disorder with hyperactivity)    4. NAFLD (nonalcoholic fatty liver disease)    5. Class 3 severe obesity due to excess calories with serious comorbidity and body mass index (BMI) of 45.0 to 49.9 in adult        Plan:   No problem-specific Assessment & Plan notes found for this encounter.    Herminio was seen today for annual exam.    Diagnoses and all orders for this visit:    Routine general medical examination at health care facility    Bipolar 1 disorder, mixed, moderate    ADD (attention deficit disorder with hyperactivity)    NAFLD (nonalcoholic fatty liver disease)    Class 3 severe obesity due to excess calories with serious comorbidity and body mass index (BMI) of 45.0 to 49.9 in adult  Comments:  Following with Valarie at [facility].  Currently on 1.7 mg, stable. mild nausea      Assessment & Plan    FATTY LIVER DISEASE:  - Acknowledged the patient's fatty liver condition and its relation to weight loss.  - Evaluated recent lab work from March, noting ALT and AST levels remain elevated but stable.  - Emphasized the importance of continued weight loss efforts and low-fat diet for liver function improvement.  - Recommend continuing Wegovy treatment for weight loss, which may help with fatty liver management.  - Advised the patient to continue follow-ups with Dr. Gary for liver-related issues.    NAUSEA (SIDE EFFECT OF MEDICATION):  - Noted that the patient is experiencing mild nausea as a  side effect of Wegovy.  - Will continue Wegovy treatment despite the mild nausea side effect.  - Instructed the patient to monitor nausea symptoms and report any worsening or additional side effects.    MEDICATION MANAGEMENT:  - Reviewed current medication regimen, including Wegovy for weight loss.    LIFESTYLE AND PREVENTIVE CARE:  - Patient to continue current health habits and maintain focus on healthy eating and exercise.  - Patient to wear seatbelt.  - Patient to avoid smoking and drug use.         Follow up in about 6 months (around 10/3/2025).    This note was generated with the assistance of ambient listening technology. Verbal consent was obtained by the patient and accompanying visitor(s) for the recording of patient appointment to facilitate this note

## 2025-04-07 ENCOUNTER — PATIENT MESSAGE (OUTPATIENT)
Dept: PSYCHIATRY | Facility: CLINIC | Age: 25
End: 2025-04-07
Payer: OTHER GOVERNMENT

## 2025-04-08 NOTE — PROGRESS NOTES
The patient location is: Patient's home . Patient reported  that his/her location at the time of this visit was in the [facility].    Visit type: Virtual visit with synchronous audio and video     Each patient to whom he or she provides medical services by telemedicine is: (1) informed of the relationship between the physician and patient and the respective role of any other health care provider with respect to management of the patient; and (2) notified that he or she may decline to receive medical services by telemedicine and may withdraw from such care at any time.    Patient was informed that I am a physician who is licensed in the [facility]:  Silver Beltran MD:  Employed by [employer]     Patient was instructed that If technology issues arise, he/she may  call our office phone at: [phone].    Pt informed that if he/she is ever in crisis (or has acute concerns), dial 911 or go to nearest Emergency Room (ER).    Pt informed that if questions related to privacy practices arise, contact [facility]: [phone].    Understanding Expressed. No questions.        Herminio Varner Johnny   2000 04/09/2025        CURRENT PRESENTATION  (psychiatric biopsychosocial evaluation; plan for treatment):   Last visit 02/27/2025 documentation includes:   ...the patient reports that he has done well overall and considers himself doing good, though he indicates that there were 2 periods of time up to 1 week in length of somewhat elevated mood with increased energy, mildly increased activity, increased speech, and mildly racing thoughts, with intact sleep, no excessive spending, no behaviors of risk, no grandiosity, and no decrease in function; he describes mild hypomania during those times.  No times of depression, irritability, decreased function, suicidal ideation, thoughts of self-harm, homicidal ideation, thoughts of harm towards others, or feelings of aggression.   Anxiety has been controlled, but he continues largely not to challenge himself.  He indicates that with Adderall and Wegovy, binge eating is fully under control, and he says that he has lost 10 lb since starting Wegovy.      Sleep is consistently 7 hours or more, but he says that his sleep is his more common 9 hours if he is able to fall asleep at 10:00 p.m., awakening at around 7:00 a.m.; he indicates that he prefers sleeping during these hours rather than the other times of 2:00 a.m.until around 9:00 a.m..  He says that he takes Seroquel 50 mg about 6:00 p.m. every evening along with melatonin and says that about half the time he is able to fall asleep at 10:00 p.m. and about half the time he is up until 2:00 a.m..  Questioning yields no pattern related to the timing of his 2nd Adderall dose, and he indicates that he is careful to take the 2nd dose of Adderall in the 1st half of the afternoon, not taking the 2nd dose if he has taken the 1st dose after mid morning.  He says that he no longer has frequent awakening.  He confirms that he consistently uses CPAP.  With long discussion of rationale, risks, and side effects, requests an increase in Seroquel to 100 mg.  Encounter Diagnoses   Name Primary?    Bipolar affective disorder, remission status unspecified Yes    Anxiety disorder, unspecified type      Attention deficit disorder with hyperactivity      Binge eating disorder, unspecified severity     PLAN:   Follow up in 3 months.    Psychiatry Medication:  Increase Seroquel to 100 mg every evening.  Continue Geodon 80 mg daily and Adderall IR 30 mg in the morning and early afternoon.    03/24/2025 documentation:  I called the patient regarding his message.  He reports brief twitching in his calf muscles multiple times per week; bilateral, but not simultaneously; not associated with any particular activity or situation.  No pain or interference with functioning.  Asked about other muscle movements, he indicates  longstanding uncommon occasional calf cramping during sleep and 1 instance of a twitching movement in 1 wrist.  No other complaints, and he reports that he is stably from a psychiatric standpoint.     The patient is aware that this could be a reflexion of a dyskinetic movement.  With long discussion tardive dyskinesia, the patient elects to discontinue the most recently started antipsychotic, Seroquel.  He reasonably expresses concern about his ability to sleep without the medication, as he notes poor sleep prior to Seroquel improvement but incomplete at 50 mg, and now sleeping well with 100 mg.  I also reviewed with him the risks of relapsing manic symptomatology, and he expressed understanding and volunteered his intent to monitor for this and contact me for any early signs or symptoms.      Long discussion of treatment options for sleep includes the risks with sedating antidepressant medication (dora, cardiovascular, weight gain), hydroxyzine or other antihistamines (QTC issues for the patient on Geodon), and benzodiazepines.  The patient elects the latter.  He also put his father on the phone for a three-way discussion, in his father is agreeable to benzodiazepines.  The patient will begin lorazepam 1 mg at bedtime.        04/07/2025 message from the patient:  Good evening Dr. Beltran, I need to bring something up I didn't bring up last time we met. I've been having days where my anxiety is through the roof. Also at night I have bouts of anxiety and mild panic attacks while trying to fall asleep, worrying about not waking up the next day. I feel as if I need to get back on anxiety medication as this has been going on for a few months. We can talk more about this when I see you next time though if you want.      indicates that 30 tablets of lorazepam 1 mg were filled on March 24 and that 60 tablets of Adderall IR 30 mg were filled on March 3, December 26, December 24, November 20, and October 15.    In the  "current session, the patient complains of anxiety and insomnia.  Regarding anxiety, he describes excessive worry, dread something bad will happen" to him or a family member, nervous about a break-in if he is home alone and vigilant for sounds; extensive and specific questioning yields no paranoia, delusions, or psychosis in these regards.  He reports that he has experienced these sorts of worries in the past and that Prozac was helpful.  No panic, agoraphobia, obsessions, compulsions, social anxiety (not challenging himself beyond family).  He reports difficulty falling asleep and feeling tired the next day, getting about 4 hours asleep ultimately.  Extensive and specific questioning yields no depression, elevated moods, irritable moods, manic signs or symptoms, grandiosity, hallucinations, suicidal ideation or thoughts of self-harm, homicidal ideation or thoughts of harm towards others, or feelings of aggression.  Focusing well.  No binge eating, and he has lost about 20 lb in recent weeks.    The twitching in his calves resolved, and he denies any other abnormal involuntary movements, including with specific questioning.  No dyskinesias are observed in the video, seen from the middle chest upward.  Questioning yields no side effects of medications; he indicates that he has difficulty with sleep even if he does not take the 2nd dose of Adderall that day (taking the 1st dose immediately upon awakening).    Interim history:  Living situation/supports:  The patient looks forward to a family trip to [place] in the next couple of weeks.  He has been enjoying his computer games and videos  Previously-  He reports positive relationships with his parents and is 18-year-old sister (graduating from high school this year), as well as his grandparents and aunt.  The family dog is doing well.  ...His father's current sales job is for [employer].  ...He sees his grandparents at least once a week and his aunt next door almost " daily.  Medical issues:  04/03/2025 primary care visit documentation includes-  Assessment & Plan    FATTY LIVER DISEASE:  - Acknowledged the patient's fatty liver condition and its relation to weight loss.  - Evaluated recent lab work from March, noting ALT and AST levels remain elevated but stable.  - Emphasized the importance of continued weight loss efforts and low-fat diet for liver function improvement.  - Recommend continuing Wegovy treatment for weight loss, which may help with fatty liver management.  - Advised the patient to continue follow-ups with Dr. Gary for liver-related issues.  NAUSEA (SIDE EFFECT OF MEDICATION):  - Noted that the patient is experiencing mild nausea as a side effect of Wegovy.  - Will continue Wegovy treatment despite the mild nausea side effect.  - Instructed the patient to monitor nausea symptoms and report any worsening or additional side effects.  MEDICATION MANAGEMENT:  - Reviewed current medication regimen, including Wegovy for weight loss.  LIFESTYLE AND PREVENTIVE CARE:  - Patient to continue current health habits and maintain focus on healthy eating and exercise.  - Patient to wear seatbelt.  - Patient to avoid smoking and drug use.   Follow up in about 6 months (around 10/3/2025).  Nonpsychotropic Medication:  Includes, per Epic and care everywhere, metformin, Wegovy   Allergies:         Review of patient's allergies indicates:   Allergen Reactions    Aripiprazole Other (See Comments)       Some impulsive buying (Xbox: Dec 2022) and some wt gain      Alcohol use:  Single beer on occasion  Other substance use:  None    Mental Status Exam:   Appearance:  Appropriately groomed  Orientation:  X4  Attitude:  Cooperative, engaged   Eye Contact:  Appropriate  Behavior:  Calm, appropriate  Speech:     Rate - WNL    Volume - WNL    Quantity - WNL    Tone - relaxed, appropriate  Pressure - no  Thought Processes:  Goal-directed  Mood:  Anxious, otherwise stably euthymic   Affect:   Without distress, euthymic, including ability to brighten at appropriate times  SI:  No, and no thoughts of self-harm  HI:  No, and no thoughts of harm towards others  Paranoia:  No  Delusions:  No  Hallucinations:  No  Attention:  Intact over the course of the session  Cognition:  No deficits noted over the course of the session  Insight:  Intact   Judgment:  Intact  Impulse Control:  Intact       ASSESSMENT:   Encounter Diagnoses   Name Primary?    Anxiety disorder, unspecified type Yes    Bipolar affective disorder, remission status unspecified     Attention deficit disorder with hyperactivity     Binge eating disorder, unspecified severity      PLAN:   Follow up in 3 weeks.    Psychiatry Medication:  Prozac 20 mg daily.  Increase lorazepam at bedtime to 2 mg.  Continue Geodon 80 mg daily and Adderall 30 mg every morning and early afternoon (before 2:00 p.m.).    Reviewed with patient:  Report side effects, other problems, or questions to the psychiatrist by way of the [facility] portal, [facility], or by calling [facility] at [phone].  Messages are checked during clinic hours only.  For urgent issues outside of clinic hours, call 911 or go to an emergency department.  Follow up with primary care/MD specialist for continued monitoring of general health and wellness and any medical conditions.  Call [facility] at [phone] or use the [facility] portal if necessary for scheduling or rescheduling.  It is the responsibility of the patient to reschedule an appointment if an appointment has been canceled or missed.  Understanding was expressed; and no further concerns or questions were raised at this time.     [address]  Prescription drug management and 2 or more stable chronic illnesses (moderate)        Large portions of this note were completed by way of voice recognition dictation software, and transcription errors are possible, such that specific information in the note should be  considered in the context of the entire report.

## 2025-04-09 ENCOUNTER — OFFICE VISIT (OUTPATIENT)
Dept: PSYCHIATRY | Facility: CLINIC | Age: 25
End: 2025-04-09
Payer: OTHER GOVERNMENT

## 2025-04-09 DIAGNOSIS — F41.9 ANXIETY DISORDER, UNSPECIFIED TYPE: Primary | ICD-10-CM

## 2025-04-09 DIAGNOSIS — F50.819 BINGE EATING DISORDER, UNSPECIFIED SEVERITY: ICD-10-CM

## 2025-04-09 DIAGNOSIS — F90.9 ATTENTION DEFICIT DISORDER WITH HYPERACTIVITY: ICD-10-CM

## 2025-04-09 DIAGNOSIS — F31.9 BIPOLAR AFFECTIVE DISORDER, REMISSION STATUS UNSPECIFIED: ICD-10-CM

## 2025-04-09 RX ORDER — LORAZEPAM 2 MG/1
2 TABLET ORAL NIGHTLY
Qty: 30 TABLET | Refills: 0 | Status: SHIPPED | OUTPATIENT
Start: 2025-04-09

## 2025-04-09 RX ORDER — FLUOXETINE HYDROCHLORIDE 20 MG/1
20 CAPSULE ORAL DAILY
Qty: 30 CAPSULE | Refills: 0 | Status: SHIPPED | OUTPATIENT
Start: 2025-04-09

## 2025-04-24 ENCOUNTER — PATIENT MESSAGE (OUTPATIENT)
Dept: INTERNAL MEDICINE | Facility: CLINIC | Age: 25
End: 2025-04-24
Payer: OTHER GOVERNMENT

## 2025-04-29 NOTE — PROGRESS NOTES
"Herminio Turner   2000 04/30/2025        CURRENT PRESENTATION  (psychiatric biopsychosocial evaluation; plan for treatment):   Last visit 04/09/2025 documentation includes:   ...the patient complains of anxiety and insomnia.  Regarding anxiety, he describes excessive worry, dread something bad will happen" to him or a family member, nervous about a break-in if he is home alone and vigilant for sounds; extensive and specific questioning yields no paranoia, delusions, or psychosis in these regards.  He reports that he has experienced these sorts of worries in the past and that Prozac was helpful.  No panic, agoraphobia, obsessions, compulsions, social anxiety (not challenging himself beyond family).  He reports difficulty falling asleep and feeling tired the next day, getting about 4 hours asleep ultimately.  Extensive and specific questioning yields no depression, elevated moods, irritable moods, manic signs or symptoms, grandiosity, hallucinations, suicidal ideation or thoughts of self-harm, homicidal ideation or thoughts of harm towards others, or feelings of aggression.  Focusing well.  No binge eating, and he has lost about 20 lb in recent weeks.     The twitching in his calves resolved, and he denies any other abnormal involuntary movements, including with specific questioning.  No dyskinesias are observed in the video, seen from the middle chest upward.  Questioning yields no side effects of medications; he indicates that he has difficulty with sleep even if he does not take the 2nd dose of Adderall that day (taking the 1st dose immediately upon awakening).  Encounter Diagnoses   Name Primary?    Bipolar affective disorder, remission status unspecified Yes    Anxiety disorder, unspecified type      Attention deficit disorder with hyperactivity      Binge eating disorder, unspecified severity     PLAN:   Follow up in 3 weeks.    Psychiatry Medication:  Prozac 20 mg daily.  Increase lorazepam at " bedtime to 2 mg.  Continue Geodon 80 mg daily and Adderall 30 mg every morning and early afternoon (before 2:00 p.m.).    02/27/2025 documentation includes:   ...the patient reports that he has done well overall and considers himself doing good, though he indicates that there were 2 periods of time up to 1 week in length of somewhat elevated mood with increased energy, mildly increased activity, increased speech, and mildly racing thoughts, with intact sleep, no excessive spending, no behaviors of risk, no grandiosity, and no decrease in function; he describes mild hypomania during those times.  No times of depression, irritability, decreased function, suicidal ideation, thoughts of self-harm, homicidal ideation, thoughts of harm towards others, or feelings of aggression.  Anxiety has been controlled, but he continues largely not to challenge himself.  He indicates that with Adderall and Wegovy, binge eating is fully under control, and he says that he has lost 10 lb since starting Wegovy.      Sleep is consistently 7 hours or more, but he says that his sleep is his more common 9 hours if he is able to fall asleep at 10:00 p.m., awakening at around 7:00 a.m.; he indicates that he prefers sleeping during these hours rather than the other times of 2:00 a.m.until around 9:00 a.m..  He says that he takes Seroquel 50 mg about 6:00 p.m. every evening along with melatonin and says that about half the time he is able to fall asleep at 10:00 p.m. and about half the time he is up until 2:00 a.m..  Questioning yields no pattern related to the timing of his 2nd Adderall dose, and he indicates that he is careful to take the 2nd dose of Adderall in the 1st half of the afternoon, not taking the 2nd dose if he has taken the 1st dose after mid morning.  He says that he no longer has frequent awakening.  He confirms that he consistently uses CPAP.  With long discussion of rationale, risks, and side effects, requests an increase in  Seroquel to 100 mg.    03/24/2025 documentation:  I called the patient regarding his message.  He reports brief twitching in his calf muscles multiple times per week; bilateral, but not simultaneously; not associated with any particular activity or situation.  No pain or interference with functioning.  Asked about other muscle movements, he indicates longstanding uncommon occasional calf cramping during sleep and 1 instance of a twitching movement in 1 wrist.  No other complaints, and he reports that he is stably from a psychiatric standpoint.     The patient is aware that this could be a reflexion of a dyskinetic movement.  With long discussion tardive dyskinesia, the patient elects to discontinue the most recently started antipsychotic, Seroquel.  He reasonably expresses concern about his ability to sleep without the medication, as he notes poor sleep prior to Seroquel improvement but incomplete at 50 mg, and now sleeping well with 100 mg.  I also reviewed with him the risks of relapsing manic symptomatology, and he expressed understanding and volunteered his intent to monitor for this and contact me for any early signs or symptoms.      Long discussion of treatment options for sleep includes the risks with sedating antidepressant medication (dora, cardiovascular, weight gain), hydroxyzine or other antihistamines (QTC issues for the patient on Geodon), and benzodiazepines.  The patient elects the latter.  He also put his father on the phone for a three-way discussion, in his father is agreeable to benzodiazepines.  The patient will begin lorazepam 1 mg at bedtime.        04/07/2025 message from the patient:  Good evening Dr. Beltran, I need to bring something up I didn't bring up last time we met. I've been having days where my anxiety is through the roof. Also at night I have bouts of anxiety and mild panic attacks while trying to fall asleep, worrying about not waking up the next day. I feel as if I need to get  back on anxiety medication as this has been going on for a few months. We can talk more about this when I see you next time though if you want.      indicates that 30 tablets of lorazepam 2 mg were filled on April 15 and that 60 tablets of Adderall IR 30 mg were filled on April 21, March 3, December 26, December 24, November 20, and October 15.    In the current session, the patient reports that he is doing well apart from insomnia, particularly difficulty falling asleep, with lorazepam not being effective.  No other complaints or questions.  Euthymic with no depression, excessively elevated moods, irritable moods, or manic signs or symptoms.  Anxiety is controlled, but he does not challenge himself with situations, avoiding situations that could bring any level of stress.  No psychosis.  No thoughts of harm to self or others or feelings of aggression.  Focusing adequately.  No binge eating.  Including with specific questioning, no side effects of medications.  No subjective or objective dyskinetic movements, apart from the patient saying that he notices a single twitch in a calf muscle about once per week, on various sides.  AIMS 0.    Interim history:  Living situation/supports:   no change  Last visit-  The patient looks forward to a family trip to [place] in the next couple of weeks.  He has been enjoying his computer games and videos  Previously-  He reports positive relationships with his parents and is 18-year-old sister (graduating from high school this year), as well as his grandparents and aunt.  The family dog is doing well.  ...His father's current sales job is for [employer].  ...He sees his grandparents at least once a week and his aunt next door almost daily.  Medical issues:  04/03/2025 primary care visit documentation includes-  Assessment & Plan    FATTY LIVER DISEASE:  - Acknowledged the patient's fatty liver condition and its relation to weight loss.  - Evaluated recent lab work from March,  noting ALT and AST levels remain elevated but stable.  - Emphasized the importance of continued weight loss efforts and low-fat diet for liver function improvement.  - Recommend continuing Wegovy treatment for weight loss, which may help with fatty liver management.  - Advised the patient to continue follow-ups with Dr. Gary for liver-related issues.  NAUSEA (SIDE EFFECT OF MEDICATION):  - Noted that the patient is experiencing mild nausea as a side effect of Wegovy.  - Will continue Wegovy treatment despite the mild nausea side effect.  - Instructed the patient to monitor nausea symptoms and report any worsening or additional side effects.  MEDICATION MANAGEMENT:  - Reviewed current medication regimen, including Wegovy for weight loss.  LIFESTYLE AND PREVENTIVE CARE:  - Patient to continue current health habits and maintain focus on healthy eating and exercise.  - Patient to wear seatbelt.  - Patient to avoid smoking and drug use.   Follow up in about 6 months (around 10/3/2025).  Nonpsychotropic Medication:  Includes, per Epic and care everywhere, metformin, Wegovy   Allergies:         Review of patient's allergies indicates:   Allergen Reactions    Aripiprazole Other (See Comments)       Some impulsive buying (Xbox: Dec 2022) and some wt gain      Alcohol use:  Single beer on occasion  Other substance use:  None    Mental Status Exam:   Appearance:  Appropriately groomed  Orientation:  X4  Attitude:  Cooperative, engaged   Eye Contact:  Appropriate  Behavior:  Calm, appropriate  Speech:     Rate - WNL    Volume - WNL    Quantity - WNL    Tone - relaxed, appropriate  Pressure - no  Thought Processes:  Goal-directed  Mood:  Euthymic  Affect:  Without distress, euthymic, including ability to brighten at appropriate times  SI:  No, and no thoughts of self-harm  HI:  No, and no thoughts of harm towards others  Paranoia:  No  Delusions:  No  Hallucinations:  No  Attention:  Intact over the course of the  session  Cognition:  No deficits noted over the course of the session  Insight:  Intact   Judgment:  Intact  Impulse Control:  Intact       ASSESSMENT:   Encounter Diagnoses   Name Primary?    Bipolar affective disorder, remission status unspecified Yes    Insomnia, unspecified type     Anxiety disorder, unspecified type     Attention deficit disorder with hyperactivity     Binge eating disorder, unspecified severity        PLAN:   Continue Geodon 80 mg daily, Prozac 20 mg daily, and Adderall IR 30 mg in the morning and early afternoon.  Wean and discontinue lorazepam by taking 1/2 tablet of 2 mg nightly for 1 week and then discontinuing use of 2 mg tablets to begin a new prescription for 0.5 mg nightly for 1 week, at which point he will stopped the medication and send me a message to begin the next sleep medication..  With discussion of various options, the patient elects sonata after considering risk/benefit.  The discussion regarding sonata included decreased alertness, dizziness, unsteadiness, effects on activities requiring alertness and steadiness, sleep behaviors, blackouts confusion, psychosis, interaction with alcohol and sedating compounds, and others.     Reviewed with patient:  Report side effects, other problems, or questions to the psychiatrist by way of the [facility] portal, [facility], or by calling [facility] at [phone].  Messages are checked during clinic hours only.  For urgent issues outside of clinic hours, call [phone] or go to an emergency department.  Follow up with primary care/MD specialist for continued monitoring of general health and wellness and any medical conditions.  Call [facility] at [phone] or use the [facility] portal if necessary for scheduling or rescheduling.  It is the responsibility of the patient to reschedule an appointment if an appointment has been canceled or missed.  Understanding was expressed; and no further concerns or questions were  raised at this time.     [address]86  Prescription drug management and 2 or more stable chronic illnesses (moderate)        Large portions of this note were completed by way of voice recognition dictation software, and transcription errors are possible, such that specific information in the note should be considered in the context of the entire report.

## 2025-04-30 ENCOUNTER — OFFICE VISIT (OUTPATIENT)
Dept: PSYCHIATRY | Facility: CLINIC | Age: 25
End: 2025-04-30
Payer: OTHER GOVERNMENT

## 2025-04-30 VITALS — SYSTOLIC BLOOD PRESSURE: 139 MMHG | DIASTOLIC BLOOD PRESSURE: 85 MMHG | HEART RATE: 121 BPM

## 2025-04-30 DIAGNOSIS — G47.00 INSOMNIA, UNSPECIFIED TYPE: ICD-10-CM

## 2025-04-30 DIAGNOSIS — F31.9 BIPOLAR AFFECTIVE DISORDER, REMISSION STATUS UNSPECIFIED: Primary | ICD-10-CM

## 2025-04-30 DIAGNOSIS — F41.9 ANXIETY DISORDER, UNSPECIFIED TYPE: ICD-10-CM

## 2025-04-30 DIAGNOSIS — F90.9 ATTENTION DEFICIT DISORDER WITH HYPERACTIVITY: ICD-10-CM

## 2025-04-30 DIAGNOSIS — F50.819 BINGE EATING DISORDER, UNSPECIFIED SEVERITY: ICD-10-CM

## 2025-04-30 PROCEDURE — 99999 PR PBB SHADOW E&M-EST. PATIENT-LVL II: CPT | Mod: PBBFAC,,, | Performed by: PSYCHIATRY & NEUROLOGY

## 2025-04-30 PROCEDURE — 99214 OFFICE O/P EST MOD 30 MIN: CPT | Mod: S$PBB,,, | Performed by: PSYCHIATRY & NEUROLOGY

## 2025-04-30 PROCEDURE — G2211 COMPLEX E/M VISIT ADD ON: HCPCS | Mod: S$PBB,,, | Performed by: PSYCHIATRY & NEUROLOGY

## 2025-04-30 PROCEDURE — 99212 OFFICE O/P EST SF 10 MIN: CPT | Mod: PBBFAC | Performed by: PSYCHIATRY & NEUROLOGY

## 2025-04-30 RX ORDER — LORAZEPAM 0.5 MG/1
TABLET ORAL
Qty: 7 TABLET | Refills: 0 | Status: SHIPPED | OUTPATIENT
Start: 2025-04-30

## 2025-04-30 RX ORDER — LORAZEPAM 2 MG/1
TABLET ORAL
Start: 2025-04-30

## 2025-04-30 RX ORDER — FLUOXETINE HYDROCHLORIDE 20 MG/1
20 CAPSULE ORAL DAILY
Qty: 90 CAPSULE | Refills: 0 | Status: SHIPPED | OUTPATIENT
Start: 2025-04-30

## 2025-05-13 ENCOUNTER — PATIENT MESSAGE (OUTPATIENT)
Dept: PSYCHIATRY | Facility: CLINIC | Age: 25
End: 2025-05-13
Payer: OTHER GOVERNMENT

## 2025-05-13 DIAGNOSIS — G47.00 INSOMNIA, UNSPECIFIED TYPE: Primary | ICD-10-CM

## 2025-05-13 RX ORDER — ZALEPLON 10 MG/1
10 CAPSULE ORAL NIGHTLY
Qty: 30 CAPSULE | Refills: 0 | Status: SHIPPED | OUTPATIENT
Start: 2025-05-13

## 2025-05-13 NOTE — TELEPHONE ENCOUNTER
The patient sent a message that he completed the weaning of lorazepam, such that he is ready to start sonata.

## 2025-05-27 ENCOUNTER — OFFICE VISIT (OUTPATIENT)
Dept: PSYCHIATRY | Facility: CLINIC | Age: 25
End: 2025-05-27
Payer: OTHER GOVERNMENT

## 2025-05-27 VITALS — HEART RATE: 119 BPM | SYSTOLIC BLOOD PRESSURE: 124 MMHG | DIASTOLIC BLOOD PRESSURE: 73 MMHG

## 2025-05-27 DIAGNOSIS — F90.9 ATTENTION DEFICIT DISORDER WITH HYPERACTIVITY: ICD-10-CM

## 2025-05-27 DIAGNOSIS — F41.9 ANXIETY DISORDER, UNSPECIFIED TYPE: ICD-10-CM

## 2025-05-27 DIAGNOSIS — F31.9 BIPOLAR AFFECTIVE DISORDER, REMISSION STATUS UNSPECIFIED: Primary | ICD-10-CM

## 2025-05-27 DIAGNOSIS — F50.819 BINGE EATING DISORDER, UNSPECIFIED SEVERITY: ICD-10-CM

## 2025-05-27 DIAGNOSIS — G47.00 INSOMNIA, UNSPECIFIED TYPE: ICD-10-CM

## 2025-05-27 PROCEDURE — 99212 OFFICE O/P EST SF 10 MIN: CPT | Mod: PBBFAC | Performed by: PSYCHIATRY & NEUROLOGY

## 2025-05-27 PROCEDURE — 99999 PR PBB SHADOW E&M-EST. PATIENT-LVL II: CPT | Mod: PBBFAC,,, | Performed by: PSYCHIATRY & NEUROLOGY

## 2025-05-27 PROCEDURE — 99215 OFFICE O/P EST HI 40 MIN: CPT | Mod: S$PBB,,, | Performed by: PSYCHIATRY & NEUROLOGY

## 2025-05-27 PROCEDURE — G2211 COMPLEX E/M VISIT ADD ON: HCPCS | Mod: S$PBB,,, | Performed by: PSYCHIATRY & NEUROLOGY

## 2025-05-27 RX ORDER — DEXTROAMPHETAMINE SACCHARATE, AMPHETAMINE ASPARTATE, DEXTROAMPHETAMINE SULFATE AND AMPHETAMINE SULFATE 7.5; 7.5; 7.5; 7.5 MG/1; MG/1; MG/1; MG/1
30 TABLET ORAL 2 TIMES DAILY
Qty: 60 TABLET | Refills: 0 | Status: SHIPPED | OUTPATIENT
Start: 2025-05-27

## 2025-05-27 RX ORDER — DEXTROAMPHETAMINE SACCHARATE, AMPHETAMINE ASPARTATE, DEXTROAMPHETAMINE SULFATE AND AMPHETAMINE SULFATE 7.5; 7.5; 7.5; 7.5 MG/1; MG/1; MG/1; MG/1
1 TABLET ORAL 2 TIMES DAILY
Qty: 60 TABLET | Refills: 0 | Status: SHIPPED | OUTPATIENT
Start: 2025-05-27

## 2025-05-27 RX ORDER — FLUOXETINE 20 MG/1
20 CAPSULE ORAL DAILY
Qty: 90 CAPSULE | Refills: 2 | Status: SHIPPED | OUTPATIENT
Start: 2025-05-27

## 2025-05-27 RX ORDER — ZALEPLON 10 MG/1
10 CAPSULE ORAL NIGHTLY
Qty: 30 CAPSULE | Refills: 2 | Status: SHIPPED | OUTPATIENT
Start: 2025-05-27

## 2025-05-27 RX ORDER — ZIPRASIDONE HYDROCHLORIDE 80 MG/1
80 CAPSULE ORAL DAILY
Qty: 90 CAPSULE | Refills: 2 | Status: SHIPPED | OUTPATIENT
Start: 2025-05-27

## 2025-05-27 NOTE — PROGRESS NOTES
Herminio Turner   2000 05/27/2025        CURRENT PRESENTATION  (psychiatric biopsychosocial evaluation; plan for treatment):   Last visit 04/30/2025 documentation includes:   ...the patient reports that he is doing well apart from insomnia, particularly difficulty falling asleep, with lorazepam not being effective.  No other complaints or questions.  Euthymic with no depression, excessively elevated moods, irritable moods, or manic signs or symptoms.  Anxiety is controlled, but he does not challenge himself with situations, avoiding situations that could bring any level of stress.  No psychosis.  No thoughts of harm to self or others or feelings of aggression.  Focusing adequately.  No binge eating.  Including with specific questioning, no side effects of medications.  No subjective or objective dyskinetic movements, apart from the patient saying that he notices a single twitch in a calf muscle about once per week, on various sides.  AIMS 0.  Encounter Diagnoses   Name Primary?    Bipolar affective disorder, remission status unspecified Yes    Anxiety disorder, unspecified type      Attention deficit disorder with hyperactivity      Binge eating disorder, unspecified severity     PLAN:   Continue Geodon 80 mg daily, Prozac 20 mg daily, and Adderall IR 30 mg in the morning and early afternoon.  Wean and discontinue lorazepam by taking 1/2 tablet of 2 mg nightly for 1 week and then discontinuing use of 2 mg tablets to begin a new prescription for 0.5 mg nightly for 1 week, at which point he will stopped the medication and send me a message to begin the next sleep medication..  With discussion of various options, the patient elects sonata after considering risk/benefit.  [Subsequent to the appointment, sonata 10 mg at bedtime was started after lorazepam weaning was completed.]     indicates that 30 capsules of sonata 10 mg were filled on May 13 and that 60 tablets of Adderall IR 30 mg were filled on April  "21, March 3, December 26, December 24, November 20, and October 15.    In the current session, the patient reports that he is doing well and has no complaints, concerns, or questions.  Questioning yields: Euthymic with no depression, excessively elevated moods, irritable moods, or manic signs or symptoms, including with extensive and specific questioning.  Anxiety is well controlled.  No psychosis.  No thoughts of harm to self or others or feelings of aggression.  Focusing well.  Eating healthfully, now sleeping well.  No side effects of medications, including with extensive and specific questioning.  No subjective dyskinetic movements; no dyskinesias are seen in the session, and AIMS 0.    His father had brought him to the appointment, and I asked the patient near the end of the evaluation if it were okay for his father to joined the session, and the patient consented.  His father: his mood is the most stable I have seen it in years, he is maturing, he has a better response to stress, I think he is doing great."    Interim history:  Living situation/supports:   The patient reports that he has been happy about a number of good things happening for him.  He indicates that he is eating more healthfully, exercising, and losing weight.  He says that he is driving again; initially, his father was driving with him, and now he is driving alone and reports that his focus, alertness, and driving skills are intact; he reports following all driving regulations and says that he has been safe and successful.  He says that he is investing again,  picking a single stock and investing 100 dollars.  Positives with his parents.  Looking forward to a trip to [place] in a couple of weeks.  Enjoying computer games, videos, the family dog, and interactions with his aunt.  Previously  The patient looks forward to a family trip to [place] in the next couple of weeks.  He has been enjoying his computer games and videos  ...He " reports positive relationships with his parents and is 18-year-old sister (graduating from high school this year), as well as his grandparents and aunt.  The family dog is doing well.  ...His father's current sales job is for [employer].  ...He sees his grandparents at least once a week and his aunt next door almost daily.  Medical issues:  05/21/2025 visit at [facility] for bariatrics includes-  wt 386 lb. -17 lbs since last appt. -25 lbs since starting medication. Currently on Wegovy 1.7mg/wk. Tolerating well without side effects. No longer experiencing nausea. Is continuing to improve diet. Drinks are mostly water throughout the day. Has been getting in pool for physical activity. Will continue with Wegovy 1.7mg/wk at this time. Continue to monitor and adjust treatment plan as needed.   Nonpsychotropic Medication:  Includes, per Epic and care everywhere, metformin, Wegovy   Allergies:         Review of patient's allergies indicates:   Allergen Reactions    Aripiprazole Other (See Comments)       Some impulsive buying (Xbox: Dec 2022) and some wt gain      Alcohol use:  Single beer on uncommon occasion  Other substance use:  None    Mental Status Exam:   Appearance:  Appropriately groomed  Orientation:  X4  Attitude:  Cooperative, engaged   Eye Contact:  Appropriate  Behavior:  Calm, appropriate  Speech:     Rate - WNL    Volume - WNL    Quantity - WNL    Tone - relaxed, appropriate  Pressure - no  Thought Processes:  Goal-directed  Mood:  Euthymic  Affect:  Without distress, euthymic, including ability to brighten at appropriate times  SI:  No, and no thoughts of self-harm  HI:  No, and no thoughts of harm towards others  Paranoia:  No  Delusions:  No  Hallucinations:  No  Attention:  Intact over the course of the session  Cognition:  No deficits noted over the course of the session  Insight:  Intact   Judgment:  Intact  Impulse Control:  Intact       ASSESSMENT:   Encounter Diagnoses   Name Primary?     Bipolar affective disorder, remission status unspecified Yes    Insomnia, unspecified type     Anxiety disorder, unspecified type     Attention deficit disorder with hyperactivity     Binge eating disorder, unspecified severity          PLAN:   RTC 3 months   Psychiatric medications:   Geodon 80 mg daily  Prozac 20 mg daily   Adderall IR 30 mg morning and afternoon   Sonata 10 mg at bedtime    Reviewed with patient:  Report side effects, other problems, or questions to the psychiatrist by way of the [facility] portal, [facility], or by calling [facility] at [phone].  Messages are checked during clinic hours only.  For urgent issues outside of clinic hours, call 911 or go to an emergency department.  Follow up with primary care/MD specialist for continued monitoring of general health and wellness and any medical conditions.  Call [facility] at [phone] or use the [facility] portal if necessary for scheduling or rescheduling.  It is the responsibility of the patient to reschedule an appointment if an appointment has been canceled or missed.  Understanding was expressed; and no further concerns or questions were raised at this time.     [id number]  42 minutes total on the day of the encounter.  8 minutes pre charting, reviewing the record, and reviewing .  28 minutes face-to-face with the patient.  6 minutes documenting the visit.        Large portions of this note were completed by way of voice recognition dictation software, and transcription errors are possible, such that specific information in the note should be considered in the context of the entire report.

## 2025-06-02 DIAGNOSIS — F90.9 ATTENTION DEFICIT DISORDER WITH HYPERACTIVITY: ICD-10-CM

## 2025-06-02 RX ORDER — DEXTROAMPHETAMINE SACCHARATE, AMPHETAMINE ASPARTATE, DEXTROAMPHETAMINE SULFATE AND AMPHETAMINE SULFATE 7.5; 7.5; 7.5; 7.5 MG/1; MG/1; MG/1; MG/1
30 TABLET ORAL 2 TIMES DAILY
Qty: 60 TABLET | Refills: 0 | Status: SHIPPED | OUTPATIENT
Start: 2025-06-02

## 2025-06-12 ENCOUNTER — OFFICE VISIT (OUTPATIENT)
Dept: OTOLARYNGOLOGY | Facility: CLINIC | Age: 25
End: 2025-06-12
Payer: OTHER GOVERNMENT

## 2025-06-12 VITALS — BODY MASS INDEX: 40.43 KG/M2 | HEIGHT: 74 IN | WEIGHT: 315 LBS

## 2025-06-12 DIAGNOSIS — H61.23 BILATERAL IMPACTED CERUMEN: Primary | ICD-10-CM

## 2025-06-12 PROCEDURE — 99212 OFFICE O/P EST SF 10 MIN: CPT | Mod: PBBFAC | Performed by: PHYSICIAN ASSISTANT

## 2025-06-12 PROCEDURE — 69210 REMOVE IMPACTED EAR WAX UNI: CPT | Mod: S$PBB,,, | Performed by: PHYSICIAN ASSISTANT

## 2025-06-12 PROCEDURE — 69210 REMOVE IMPACTED EAR WAX UNI: CPT | Mod: PBBFAC | Performed by: PHYSICIAN ASSISTANT

## 2025-06-12 PROCEDURE — 99499 UNLISTED E&M SERVICE: CPT | Mod: S$PBB,,, | Performed by: PHYSICIAN ASSISTANT

## 2025-06-12 PROCEDURE — 99999 PR PBB SHADOW E&M-EST. PATIENT-LVL II: CPT | Mod: PBBFAC,,, | Performed by: PHYSICIAN ASSISTANT

## 2025-06-12 NOTE — PROGRESS NOTES
"Subjective:   Patient ID: Herminio Turner is a 24 y.o. male.    Chief Complaint: Cerumen Impaction (Pt reports that he is here for an ear cleaning. Denies any other issues, at today's visit.)    Mr. Turner is a very pleasant 23 yo male here to see me today for cleaning.     Follow-up      Review of patient's allergies indicates:   Allergen Reactions    Aripiprazole Other (See Comments)     Some impulsive buying (Xbox: Dec 2022) and some wt gain           Review of Systems   Constitutional: Negative.    HENT: Negative.     Eyes: Negative.    Respiratory: Negative.     Cardiovascular: Negative.    Gastrointestinal: Negative.    Endocrine: Negative.    Genitourinary: Negative.    Musculoskeletal: Negative.    Skin: Negative.    Allergic/Immunologic: Negative.    Neurological: Negative.    Hematological: Negative.    Psychiatric/Behavioral: Negative.           Objective:   Ht 6' 2" (1.88 m)   Wt (!) 175 kg (385 lb 12.9 oz)   BMI 49.53 kg/m²     Physical Exam  Constitutional:       General: He is not in acute distress.     Appearance: He is well-developed.   HENT:      Head: Normocephalic and atraumatic.      Jaw: No trismus.      Right Ear: Hearing, tympanic membrane, ear canal and external ear normal. There is impacted cerumen (suctioned).      Left Ear: Hearing, tympanic membrane, ear canal and external ear normal. There is impacted cerumen (suctioned).      Ears:      Comments: Moist ear canals bilaterally     Nose: Nose normal. No nasal deformity, septal deviation, mucosal edema or rhinorrhea.      Mouth/Throat:      Dentition: Normal dentition.      Pharynx: Uvula midline. No oropharyngeal exudate or uvula swelling.   Eyes:      General: No scleral icterus.     Conjunctiva/sclera: Conjunctivae normal.      Right eye: Right conjunctiva is not injected. No chemosis.     Left eye: Left conjunctiva is not injected. No chemosis.     Pupils: Pupils are equal, round, and reactive to light.   Neck:      Thyroid: No " thyroid mass or thyromegaly.      Trachea: Trachea and phonation normal. No tracheal tenderness or tracheal deviation.   Pulmonary:      Effort: Pulmonary effort is normal. No accessory muscle usage or respiratory distress.      Breath sounds: No stridor.   Lymphadenopathy:      Head:      Right side of head: No submental, submandibular, preauricular or posterior auricular adenopathy.      Left side of head: No submental, submandibular, preauricular or posterior auricular adenopathy.      Cervical: No cervical adenopathy.      Right cervical: No superficial or deep cervical adenopathy.     Left cervical: No superficial or deep cervical adenopathy.   Skin:     General: Skin is warm and dry.      Findings: No erythema or rash.   Neurological:      Mental Status: He is alert and oriented to person, place, and time.      Cranial Nerves: No cranial nerve deficit.   Psychiatric:         Behavior: Behavior normal.         Thought Content: Thought content normal.          Procedure Note    CHIEF COMPLAINT:  Cerumen Impaction    Description:  The patient was seated in an exam chair.  An ear speculum was placed in the right EAC and was examined under the microscope.  Suction and/or loop curettes were used to remove a large cerumen impaction.  The tympanic membrane was visualized and was normal in appearance.  The procedure was repeated on the left side in a similar fashion.  The TM was intact and normal on this side as well.  The patient tolerated the procedure well.     Assessment:     1. Bilateral impacted cerumen            Plan:     Bilateral impacted cerumen           Cerumen impaction:  Removed today without difficulty.  I would recommend the use of a wax softening drop, either over the counter Debrox or mineral oil, on a weekly basis.  I also instructed the patient to avoid Qtips. Would recommend routine clean ing every 6 months.

## 2025-06-17 DIAGNOSIS — F90.9 ATTENTION DEFICIT DISORDER WITH HYPERACTIVITY: ICD-10-CM

## 2025-06-17 RX ORDER — DEXTROAMPHETAMINE SACCHARATE, AMPHETAMINE ASPARTATE, DEXTROAMPHETAMINE SULFATE AND AMPHETAMINE SULFATE 7.5; 7.5; 7.5; 7.5 MG/1; MG/1; MG/1; MG/1
30 TABLET ORAL 2 TIMES DAILY
Qty: 60 TABLET | Refills: 0 | OUTPATIENT
Start: 2025-06-17

## 2025-06-17 NOTE — TELEPHONE ENCOUNTER
A request to refill Adderall was received.  I spoke with the pharmacy and confirmed that this is a duplicate.  The refill request is declined as a duplicate.

## 2025-07-11 ENCOUNTER — PATIENT MESSAGE (OUTPATIENT)
Dept: PSYCHIATRY | Facility: CLINIC | Age: 25
End: 2025-07-11
Payer: OTHER GOVERNMENT

## 2025-07-11 DIAGNOSIS — F31.9 BIPOLAR AFFECTIVE DISORDER, REMISSION STATUS UNSPECIFIED: ICD-10-CM

## 2025-07-11 DIAGNOSIS — F41.9 ANXIETY DISORDER, UNSPECIFIED TYPE: ICD-10-CM

## 2025-07-11 RX ORDER — FLUOXETINE HYDROCHLORIDE 40 MG/1
40 CAPSULE ORAL DAILY
Qty: 90 CAPSULE | Refills: 0 | Status: SHIPPED | OUTPATIENT
Start: 2025-07-11

## 2025-07-11 NOTE — TELEPHONE ENCOUNTER
"I called the patient regarding his message.  He reports that he has been depressed with increased sleep and decreased mood, energy, interest, enjoyment, activity level, and self-esteem.  No suicidal ideation or thoughts of self-harm, and he describes consistent confidence in his safety and consistent awareness of protective factors.  Basic self-care is intact.  No problematic anxiety.  Extensive questioning yields no interim excessively elevated moods, irritable moods, or manic signs or symptoms.  Never with psychosis, thoughts of harm towards others, or feelings of aggression.  He is calm, pleasant, appreciative, and fully appropriate interaction on the phone.  Questioning yields no side effects of medications.    He reports that his aunt had a stroke about 6 weeks ago but currently is recovered.  She has pending surgery at the end of this month, as the stroke was caused by a piece of a heart valve coming off."  He says that this could be a contributing factor, but he denies that the issue is a current stressor for him.      Increase Prozac to 40 mg daily, monitoring for dora and other SSRI side effects, of which the patient demonstrates awareness.  "

## 2025-07-24 ENCOUNTER — OFFICE VISIT (OUTPATIENT)
Dept: PULMONOLOGY | Facility: CLINIC | Age: 25
End: 2025-07-24
Payer: OTHER GOVERNMENT

## 2025-07-24 VITALS
SYSTOLIC BLOOD PRESSURE: 116 MMHG | DIASTOLIC BLOOD PRESSURE: 76 MMHG | WEIGHT: 315 LBS | BODY MASS INDEX: 40.43 KG/M2 | OXYGEN SATURATION: 97 % | HEIGHT: 74 IN | HEART RATE: 110 BPM | RESPIRATION RATE: 22 BRPM

## 2025-07-24 DIAGNOSIS — G47.33 OSA (OBSTRUCTIVE SLEEP APNEA): Primary | ICD-10-CM

## 2025-07-24 DIAGNOSIS — G47.00 INSOMNIA, UNSPECIFIED TYPE: ICD-10-CM

## 2025-07-24 DIAGNOSIS — E66.01 MORBID OBESITY WITH BMI OF 45.0-49.9, ADULT: ICD-10-CM

## 2025-07-24 PROCEDURE — 99214 OFFICE O/P EST MOD 30 MIN: CPT | Mod: PBBFAC,PO | Performed by: NURSE PRACTITIONER

## 2025-07-24 PROCEDURE — 99999 PR PBB SHADOW E&M-EST. PATIENT-LVL IV: CPT | Mod: PBBFAC,,, | Performed by: NURSE PRACTITIONER

## 2025-07-24 NOTE — PROGRESS NOTES
"Patient ID: Herminio Turner is a 25 y.o. male.    Chief Complaint: Sleep Apnea      Subjective:   HPI  Presents for CHARLIE on APAP. He has been having increased stress due to family health so not sleeping well.   He has had weight loss with wegovy. 39 lbs. BMI now 47.  Sonata for insomnia.       Problem List[1]  /76   Pulse 110   Resp (!) 22   Ht 6' 2" (1.88 m)   Wt (!) 167.7 kg (369 lb 11.4 oz)   SpO2 97%   BMI 47.47 kg/m²   Body mass index is 47.47 kg/m².    Review of Systems   Constitutional: Negative.    HENT: Negative.     Respiratory: Negative.     Cardiovascular: Negative.    Musculoskeletal: Negative.    Gastrointestinal: Negative.    Neurological: Negative.    Psychiatric/Behavioral:  Positive for sleep disturbance.      Objective:      Physical Exam  Constitutional:       Appearance: He is obese.   HENT:      Head: Normocephalic and atraumatic.      Nose: Nose normal.   Cardiovascular:      Rate and Rhythm: Normal rate and regular rhythm.   Pulmonary:      Effort: Pulmonary effort is normal.      Breath sounds: Normal breath sounds.   Abdominal:      Palpations: Abdomen is soft.      Tenderness: There is no abdominal tenderness.   Musculoskeletal:         General: Normal range of motion.      Cervical back: Normal range of motion and neck supple.   Skin:     General: Skin is warm and dry.   Neurological:      General: No focal deficit present.      Mental Status: He is alert and oriented to person, place, and time.   Psychiatric:         Mood and Affect: Mood normal.         Behavior: Behavior normal.       Personal Diagnostic Review      7/24/2025     2:57 PM   EPWORTH SLEEPINESS SCALE   Sitting and reading 2   Watching TV 2   Sitting, inactive in a public place (e.g. a theatre or a meeting) 1   As a passenger in a car for an hour without a break 2   Lying down to rest in the afternoon when circumstances permit 3   Sitting and talking to someone 1   Sitting quietly after a lunch without " alcohol 1   In a car, while stopped for a few minutes in traffic 1   Total score 13      Compliance Report Compliance Payor Standard Usage 06/24/2025 - 07/23/2025 Usage days 30/30 days (100%) >= 4 hours 26 days (87%) < 4 hours 4 days (13%) Usage hours 182 hours 7 minutes Average usage (total days) 6 hours 4 minutes Average usage (days used) 6 hours 4 minutes Median usage (days used) 6 hours 16 minutes Total used hours (value since last reset - 07/23/2025) 9,173 hours AirSense 10 AutoSet Serial number [id number] Mode AutoSet Min Pressure 6 cmH2O Max Pressure 20 cmH2O EPR Fulltime EPR level 3 Response Standard Therapy Pressure - cmH2O Median: 6.7 95th percentile: 8.6 Maximum: 9.5 Leaks - L/min Median: 0.0 95th percentile: 4.0 Maximum: 8.5 Events per hour AI: 0.3 HI: 0.2 AHI: 0.5 Apnea Index Central: 0.0 Obstructive: 0.3 Unknown: 0.0 RERA Index 0.0 Cheyne-Brady respiration (average duration per night) 0 minutes (0%    Assessment:       1. CHARLIE (obstructive sleep apnea)    2. Insomnia, unspecified type    3. Morbid obesity with BMI of 45.0-49.9, adult        Encounter Medications[2]  Orders Placed This Encounter   Procedures    CPAP/BIPAP SUPPLIES     aeroflow     Length of need (1-99 months)::   99     Choose ONE mask type and its corresponding cushions and/or pillows::    Full Face Mask, 1 per 90 days:  Full Face Cushion, (3 per 90 days)     Choose EITHER Heated or Non-Heated Tubing::    Heated Tubing, 1 per 6 months     All other supplies as needed as listed below::    Headgear, 1 per 180 days     All other supplies as needed as listed below::    Disposable Filter, 6 per 90 days     All other supplies as needed as listed below::    Non-Disposable Filter, 1 per 180 days     All other supplies as needed as listed below::    Humidifier Chamber, 1 per 180 days     Plan:       1. CHARLIE (obstructive sleep apnea)  -     CPAP/BIPAP SUPPLIES    2. Insomnia, unspecified type    3. Morbid  obesity with BMI of 45.0-49.9, adult     Weight loss and exercise to improve overall health.  Work on sleep scheduling.   Compliant with PAP and benefits from use. Follow up annually in the sleep clinic.                     Elizabeth LeJeune, MILLIE, ANP         [1]   Patient Active Problem List  Diagnosis    ADD (attention deficit disorder with hyperactivity)    Anxiety disorder    Bipolar 1 disorder (Jan 25 2022: referred in on Latuda 40 mg)    Gender identity issues; in adol identified some  as female tho parents in record report that actauly no attraction to either male or female    Autism disorder ( formerly Asperger)    Bipolar 1 disorder, mixed, moderate    Class 3 severe obesity due to excess calories with serious comorbidity and body mass index (BMI) of 45.0 to 49.9 in adult    NAFLD (nonalcoholic fatty liver disease)   [2]   Outpatient Encounter Medications as of 7/24/2025   Medication Sig Dispense Refill    dextroamphetamine-amphetamine 30 mg Tab Take 1 tablet (30 mg total) by mouth 2 (two) times a day. Morning and afternoon.  Fill on or after 06/26/2025. 60 tablet 0    dextroamphetamine-amphetamine 30 mg Tab Take 1 tablet (30 mg total) by mouth 2 (two) times a day. Morning and afternoon.  Fill on or after 07/26/2025 60 tablet 0    dextroamphetamine-amphetamine 30 mg Tab Take 1 tablet (30 mg total) by mouth 2 (two) times a day. Morning and afternoon. 60 tablet 0    FLUoxetine 40 MG capsule Take 1 capsule (40 mg total) by mouth once daily. 90 capsule 0    iron-vitamin C 100-250 mg, ICAR-C, (ICAR-C) 100-250 mg Tab Take 1 tablet by mouth once daily. 90 tablet 3    ketoconazole (NIZORAL) 2 % shampoo Apply topically 3 (three) times a week. 120 mL 2    melatonin 5 mg Cap Take by mouth.      metFORMIN (GLUCOPHAGE-XR) 500 MG ER 24hr tablet Take 500 mg by mouth 2 (two) times daily.      WEGOVY 1.7 mg/0.75 mL PnIj Inject 1.7 mg into the skin.      zaleplon (SONATA) 10 MG capsule Take 1 capsule (10 mg total) by mouth  nightly. 30 capsule 2    ziprasidone (GEODON) 80 MG capsule Take 1 capsule (80 mg total) by mouth once daily. 90 capsule 2    [DISCONTINUED] FLUoxetine 20 MG capsule Take 1 capsule (20 mg total) by mouth once daily. 90 capsule 2     No facility-administered encounter medications on file as of 7/24/2025.

## 2025-07-29 DIAGNOSIS — F90.9 ATTENTION DEFICIT DISORDER WITH HYPERACTIVITY: ICD-10-CM

## 2025-07-30 RX ORDER — DEXTROAMPHETAMINE SACCHARATE, AMPHETAMINE ASPARTATE, DEXTROAMPHETAMINE SULFATE AND AMPHETAMINE SULFATE 7.5; 7.5; 7.5; 7.5 MG/1; MG/1; MG/1; MG/1
30 TABLET ORAL 2 TIMES DAILY
Qty: 60 TABLET | Refills: 0 | Status: SHIPPED | OUTPATIENT
Start: 2025-07-30

## 2025-08-01 ENCOUNTER — LAB VISIT (OUTPATIENT)
Dept: LAB | Facility: HOSPITAL | Age: 25
End: 2025-08-01
Attending: NURSE PRACTITIONER
Payer: OTHER GOVERNMENT

## 2025-08-01 ENCOUNTER — RESULTS FOLLOW-UP (OUTPATIENT)
Dept: INTERNAL MEDICINE | Facility: CLINIC | Age: 25
End: 2025-08-01

## 2025-08-01 ENCOUNTER — OFFICE VISIT (OUTPATIENT)
Dept: INTERNAL MEDICINE | Facility: CLINIC | Age: 25
End: 2025-08-01
Payer: OTHER GOVERNMENT

## 2025-08-01 VITALS
WEIGHT: 315 LBS | BODY MASS INDEX: 46.59 KG/M2 | TEMPERATURE: 97 F | OXYGEN SATURATION: 97 % | SYSTOLIC BLOOD PRESSURE: 138 MMHG | HEART RATE: 108 BPM | DIASTOLIC BLOOD PRESSURE: 82 MMHG

## 2025-08-01 DIAGNOSIS — R35.0 URINARY FREQUENCY: ICD-10-CM

## 2025-08-01 DIAGNOSIS — R11.0 NAUSEA: ICD-10-CM

## 2025-08-01 DIAGNOSIS — E16.2 HYPOGLYCEMIA: Primary | ICD-10-CM

## 2025-08-01 DIAGNOSIS — E16.2 HYPOGLYCEMIA: ICD-10-CM

## 2025-08-01 DIAGNOSIS — E66.813 CLASS 3 SEVERE OBESITY WITH BODY MASS INDEX (BMI) OF 45.0 TO 49.9 IN ADULT, UNSPECIFIED OBESITY TYPE, UNSPECIFIED WHETHER SERIOUS COMORBIDITY PRESENT: ICD-10-CM

## 2025-08-01 LAB
BILIRUB SERPL-MCNC: ABNORMAL MG/DL
BLOOD URINE, POC: ABNORMAL
CLARITY, POC UA: CLEAR
COLOR, POC UA: ABNORMAL
EAG (OHS): 94 MG/DL (ref 68–131)
GLUCOSE SERPL-MCNC: 91 MG/DL (ref 70–110)
GLUCOSE UR QL STRIP: ABNORMAL
HBA1C MFR BLD: 4.9 % (ref 4–5.6)
KETONES UR QL STRIP: 15
LEUKOCYTE ESTERASE URINE, POC: ABNORMAL
NITRITE, POC UA: ABNORMAL
PH, POC UA: 6.5
PROTEIN, POC: 30
SPECIFIC GRAVITY, POC UA: 1.01
T4 FREE SERPL-MCNC: 1.1 NG/DL (ref 0.71–1.51)
TSH SERPL-ACNC: 4.45 UIU/ML (ref 0.4–4)
UROBILINOGEN, POC UA: 0.2

## 2025-08-01 PROCEDURE — 36415 COLL VENOUS BLD VENIPUNCTURE: CPT | Mod: PO

## 2025-08-01 PROCEDURE — 99213 OFFICE O/P EST LOW 20 MIN: CPT | Mod: PBBFAC,PO | Performed by: NURSE PRACTITIONER

## 2025-08-01 PROCEDURE — 99999 PR PBB SHADOW E&M-EST. PATIENT-LVL III: CPT | Mod: PBBFAC,,, | Performed by: NURSE PRACTITIONER

## 2025-08-01 PROCEDURE — 83036 HEMOGLOBIN GLYCOSYLATED A1C: CPT

## 2025-08-01 PROCEDURE — 84439 ASSAY OF FREE THYROXINE: CPT

## 2025-08-01 PROCEDURE — 84443 ASSAY THYROID STIM HORMONE: CPT

## 2025-08-01 RX ORDER — ONDANSETRON 4 MG/1
4 TABLET, ORALLY DISINTEGRATING ORAL EVERY 8 HOURS PRN
Qty: 15 TABLET | Refills: 0 | Status: SHIPPED | OUTPATIENT
Start: 2025-08-01

## 2025-08-01 NOTE — PROGRESS NOTES
Subjective:       Patient ID: Herminio Turner is a 25 y.o. male.  CHIEF COMPLAINT:  - Mr. Turner presents with concerns of perceived hypoglycemia, severe nausea, and increased urination.    HPI:  Mr. Turner reports perceived hypoglycemia, despite normal medical test results, which first occurred about a week ago with difficulty focusing and concern about driving safely. He has extreme nausea, which started yesterday and is more severe than usual for his medication (wegovy) side effects. Due to nausea, he has been unable to eat much and had difficulty retaining food and fluids yesterday.    He reports difficulty sleeping with restlessness throughout the night. He notes increased urination frequency, even with minimal fluid intake, which began yesterday along with the severe nausea.    Mr. Turner mentions visiting his aunt in the hospital a few days ago and speculates about potential exposure. He is currently taking Metformin for pre-diabetes and Wegovy for weight loss. He recently increased protein intake due to perceived hypoglycemia and attempted to reduce consumption of unhealthy foods. He occasionally skips meals and typically consumes one energy drink per day, usually before morning workouts. He only drinks diet soda when consuming carbonated beverages.    Mr. Turner notes that, aside from today, he has been feeling better with gradual weight loss and slightly increased energy levels.    Mr. Turner denies illness in his immediate contacts.        /82   Pulse 108   Temp 97.2 °F (36.2 °C)   Wt (!) 164.6 kg (362 lb 14 oz)   SpO2 97%   BMI 46.59 kg/m²     Review of Systems   Constitutional:  Positive for fatigue and unexpected weight change. Negative for activity change, appetite change, chills, diaphoresis and fever.   HENT: Negative.     Eyes: Negative.    Respiratory:  Negative for apnea, chest tightness, shortness of breath and stridor.    Cardiovascular:  Negative for chest pain,  palpitations and leg swelling.   Gastrointestinal:  Positive for nausea. Negative for abdominal pain and constipation.   Endocrine: Negative.    Genitourinary: Negative.    Musculoskeletal:  Negative for arthralgias and myalgias.   Skin:  Negative for color change, pallor, rash and wound.   Allergic/Immunologic: Negative.    Neurological:  Positive for dizziness, weakness and light-headedness. Negative for facial asymmetry and headaches.   Hematological:  Negative for adenopathy.   Psychiatric/Behavioral:  Negative for agitation and behavioral problems.        Objective:      Physical Exam  Vitals reviewed.   Constitutional:       General: He is not in acute distress.     Appearance: Normal appearance. He is obese.   HENT:      Head: Normocephalic.      Right Ear: Tympanic membrane normal.      Left Ear: Tympanic membrane normal.      Nose: Nose normal.   Eyes:      Conjunctiva/sclera: Conjunctivae normal.      Pupils: Pupils are equal, round, and reactive to light.   Cardiovascular:      Rate and Rhythm: Normal rate.      Pulses: Normal pulses.   Pulmonary:      Effort: Pulmonary effort is normal.   Abdominal:      General: Abdomen is flat.      Palpations: Abdomen is soft.   Musculoskeletal:         General: Normal range of motion.      Cervical back: Normal range of motion.   Skin:     General: Skin is warm.      Capillary Refill: Capillary refill takes less than 2 seconds.   Neurological:      Mental Status: He is alert and oriented to person, place, and time. Mental status is at baseline.   Psychiatric:         Mood and Affect: Mood normal.         Assessment and Plan        Herminio was seen today for urinary frequency, fatigue and insomnia.    Diagnoses and all orders for this visit:    Hypoglycemia  -     TSH; Future  -     Hemoglobin A1C; Future  -     POCT Glucose, Hand-Held Device    Urinary frequency  -     POCT URINE DIPSTICK WITHOUT MICROSCOPE    Nausea  -     ondansetron (ZOFRAN-ODT) 4 MG TbDL; Take 1  "tablet (4 mg total) by mouth every 8 (eight) hours as needed (nausea).    Class 3 severe obesity with body mass index (BMI) of 45.0 to 49.9 in adult, unspecified obesity type, unspecified whether serious comorbidity present      There are no Patient Instructions on file for this visit.      1. Hypoglycemia    2. Urinary frequency    3. Nausea    4. Class 3 severe obesity with body mass index (BMI) of 45.0 to 49.9 in adult, unspecified obesity type, unspecified whether serious comorbidity present        Assessment & Plan    ## PREDIABETES:  - Monitored patient's pre-diabetic status, currently managed with Metformin and Wegovy for blood sugar control and weight management.  - Mr. Turner reports blood sugar has been "fine" despite experiencing hypoglycemic symptoms.  - Discussed how the combination of these medications along with weight loss, dietary modifications, and occasional meal skipping may contribute to lower glucose levels.  - Ordered labs including A1C and thyroid levels for further evaluation of the condition.    ## HYPOGLYCEMIA:  - Mr. Turner reports experiencing hypoglycemic symptoms, including inability to focus and concern about crashing the car.  - While Metformin and Wegovy can lower glucose, they typically don't cause clinically significant hypoglycemia.  - Thyroid dysfunction will be investigated as a potential cause of these symptoms through ordered labs.  - Advised patient on management strategies including: consuming small, frequent meals with balanced carbohydrates and protein; maintaining adequate hydration; avoiding drinking calories (diet sodas acceptable); and considering sugar-free electrolyte solutions (e.g., sugar-free Gatorade or Powerade) to boost electrolyte levels.    ## NAUSEA AND VOMITING:  - Mr. Turner reports extreme nausea and inability to eat adequately, which exceeds typical elagolix side effects.  - Prescribed antiemetic medication to prevent dehydration over the weekend.  - " Ordered thyroid function tests to investigate these symptoms.  - Also considering potential infection from recent hospitalization as a possible cause of the reported nausea and vomiting.    ## URINARY FREQUENCY:  - Mr. Turner reports increased frequency of urination, even with minimal fluid intake.  - Ordered urinalysis to evaluate for urinary tract infection.  - Considering potential infection from recent hospitalization as a possible cause.  - Recommend maintaining adequate hydration despite the urinary frequency symptoms.          FOLLOW UP:  - clinically pt appears well today  - will order labs and close follow up  - push fluids to ensure hydration  Follow up for worsening or no improvement in symptoms and PRN.      This note was generated with the assistance of ambient listening technology. Verbal consent was obtained by the patient and accompanying visitor(s) for the recording of patient appointment to facilitate this note. I attest to having reviewed and edited the generated note for accuracy, though some syntax or spelling errors may persist. Please contact the author of this note for any clarification.

## 2025-08-14 ENCOUNTER — OFFICE VISIT (OUTPATIENT)
Dept: OTOLARYNGOLOGY | Facility: CLINIC | Age: 25
End: 2025-08-14
Payer: OTHER GOVERNMENT

## 2025-08-14 VITALS — BODY MASS INDEX: 40.43 KG/M2 | WEIGHT: 315 LBS | HEIGHT: 74 IN

## 2025-08-14 DIAGNOSIS — H61.23 BILATERAL IMPACTED CERUMEN: Primary | ICD-10-CM

## 2025-08-14 PROCEDURE — 99213 OFFICE O/P EST LOW 20 MIN: CPT | Mod: PBBFAC | Performed by: PHYSICIAN ASSISTANT

## 2025-08-14 PROCEDURE — 69210 REMOVE IMPACTED EAR WAX UNI: CPT | Mod: PBBFAC | Performed by: PHYSICIAN ASSISTANT

## 2025-08-14 PROCEDURE — 99999 PR PBB SHADOW E&M-EST. PATIENT-LVL III: CPT | Mod: PBBFAC,,, | Performed by: PHYSICIAN ASSISTANT

## 2025-08-27 ENCOUNTER — OFFICE VISIT (OUTPATIENT)
Dept: PSYCHIATRY | Facility: CLINIC | Age: 25
End: 2025-08-27
Payer: OTHER GOVERNMENT

## 2025-08-27 VITALS — DIASTOLIC BLOOD PRESSURE: 77 MMHG | SYSTOLIC BLOOD PRESSURE: 108 MMHG | HEART RATE: 85 BPM

## 2025-08-27 DIAGNOSIS — F50.819 BINGE EATING DISORDER, UNSPECIFIED SEVERITY: Chronic | ICD-10-CM

## 2025-08-27 DIAGNOSIS — G47.00 INSOMNIA, UNSPECIFIED TYPE: Chronic | ICD-10-CM

## 2025-08-27 DIAGNOSIS — F41.9 ANXIETY DISORDER, UNSPECIFIED TYPE: Chronic | ICD-10-CM

## 2025-08-27 DIAGNOSIS — F31.9 BIPOLAR AFFECTIVE DISORDER, REMISSION STATUS UNSPECIFIED: Primary | Chronic | ICD-10-CM

## 2025-08-27 DIAGNOSIS — F90.9 ATTENTION DEFICIT DISORDER WITH HYPERACTIVITY: Chronic | ICD-10-CM

## 2025-08-27 PROCEDURE — 99212 OFFICE O/P EST SF 10 MIN: CPT | Mod: PBBFAC | Performed by: PSYCHIATRY & NEUROLOGY

## 2025-08-27 PROCEDURE — G2211 COMPLEX E/M VISIT ADD ON: HCPCS | Mod: ,,, | Performed by: PSYCHIATRY & NEUROLOGY

## 2025-08-27 PROCEDURE — 99999 PR PBB SHADOW E&M-EST. PATIENT-LVL II: CPT | Mod: PBBFAC,,, | Performed by: PSYCHIATRY & NEUROLOGY

## 2025-08-27 PROCEDURE — 99215 OFFICE O/P EST HI 40 MIN: CPT | Mod: S$PBB,,, | Performed by: PSYCHIATRY & NEUROLOGY

## 2025-08-27 RX ORDER — DEXTROAMPHETAMINE SACCHARATE, AMPHETAMINE ASPARTATE, DEXTROAMPHETAMINE SULFATE AND AMPHETAMINE SULFATE 7.5; 7.5; 7.5; 7.5 MG/1; MG/1; MG/1; MG/1
30 TABLET ORAL 2 TIMES DAILY
Qty: 60 TABLET | Refills: 0 | Status: SHIPPED | OUTPATIENT
Start: 2025-08-27

## 2025-08-27 RX ORDER — ZIPRASIDONE HYDROCHLORIDE 20 MG/1
20 CAPSULE ORAL DAILY
Qty: 90 CAPSULE | Refills: 0 | Status: SHIPPED | OUTPATIENT
Start: 2025-08-27

## 2025-08-27 RX ORDER — ZIPRASIDONE HYDROCHLORIDE 80 MG/1
80 CAPSULE ORAL DAILY
Qty: 90 CAPSULE | Refills: 2 | Status: SHIPPED | OUTPATIENT
Start: 2025-08-27

## 2025-08-27 RX ORDER — FLUOXETINE 20 MG/1
20 CAPSULE ORAL DAILY
Qty: 90 CAPSULE | Refills: 0 | Status: SHIPPED | OUTPATIENT
Start: 2025-08-27

## 2025-08-27 RX ORDER — ZALEPLON 10 MG/1
20 CAPSULE ORAL NIGHTLY
Qty: 60 CAPSULE | Refills: 1 | Status: SHIPPED | OUTPATIENT
Start: 2025-08-27

## 2025-09-02 DIAGNOSIS — F90.9 ATTENTION DEFICIT DISORDER WITH HYPERACTIVITY: Chronic | ICD-10-CM

## 2025-09-03 RX ORDER — DEXTROAMPHETAMINE SACCHARATE, AMPHETAMINE ASPARTATE, DEXTROAMPHETAMINE SULFATE AND AMPHETAMINE SULFATE 7.5; 7.5; 7.5; 7.5 MG/1; MG/1; MG/1; MG/1
30 TABLET ORAL 2 TIMES DAILY
Qty: 60 TABLET | Refills: 0 | OUTPATIENT
Start: 2025-09-03